# Patient Record
Sex: MALE | Race: WHITE | Employment: OTHER | URBAN - METROPOLITAN AREA
[De-identification: names, ages, dates, MRNs, and addresses within clinical notes are randomized per-mention and may not be internally consistent; named-entity substitution may affect disease eponyms.]

---

## 2020-04-25 ENCOUNTER — ANESTHESIA (OUTPATIENT)
Dept: INTERVENTIONAL RADIOLOGY/VASCULAR | Age: 70
DRG: 023 | End: 2020-04-25
Payer: MEDICARE

## 2020-04-25 ENCOUNTER — APPOINTMENT (OUTPATIENT)
Dept: INTERVENTIONAL RADIOLOGY/VASCULAR | Age: 70
DRG: 023 | End: 2020-04-25
Payer: MEDICARE

## 2020-04-25 ENCOUNTER — APPOINTMENT (OUTPATIENT)
Dept: CT IMAGING | Age: 70
DRG: 023 | End: 2020-04-25
Payer: MEDICARE

## 2020-04-25 ENCOUNTER — ANESTHESIA EVENT (OUTPATIENT)
Dept: INTERVENTIONAL RADIOLOGY/VASCULAR | Age: 70
DRG: 023 | End: 2020-04-25
Payer: MEDICARE

## 2020-04-25 ENCOUNTER — APPOINTMENT (OUTPATIENT)
Dept: GENERAL RADIOLOGY | Age: 70
DRG: 023 | End: 2020-04-25
Payer: MEDICARE

## 2020-04-25 ENCOUNTER — HOSPITAL ENCOUNTER (INPATIENT)
Age: 70
LOS: 8 days | Discharge: NURSING, REHAB OR LONG-TERM CARE | DRG: 023 | End: 2020-05-03
Attending: EMERGENCY MEDICINE | Admitting: INTERNAL MEDICINE
Payer: MEDICARE

## 2020-04-25 ENCOUNTER — APPOINTMENT (OUTPATIENT)
Dept: MRI IMAGING | Age: 70
DRG: 023 | End: 2020-04-25
Payer: MEDICARE

## 2020-04-25 VITALS — OXYGEN SATURATION: 100 % | SYSTOLIC BLOOD PRESSURE: 185 MMHG | TEMPERATURE: 94.9 F | DIASTOLIC BLOOD PRESSURE: 98 MMHG

## 2020-04-25 PROBLEM — I63.9 ACUTE CVA (CEREBROVASCULAR ACCIDENT) (HCC): Status: ACTIVE | Noted: 2020-04-25

## 2020-04-25 LAB
% CKMB: 1.1 % (ref 0–3.5)
-: NORMAL
ABSOLUTE EOS #: 0.09 K/UL (ref 0–0.44)
ABSOLUTE IMMATURE GRANULOCYTE: 0.03 K/UL (ref 0–0.3)
ABSOLUTE LYMPH #: 0.94 K/UL (ref 1.1–3.7)
ABSOLUTE MONO #: 0.4 K/UL (ref 0.1–1.2)
ALLEN TEST: ABNORMAL
ALLEN TEST: POSITIVE
AMORPHOUS: NORMAL
ANION GAP SERPL CALCULATED.3IONS-SCNC: 12 MMOL/L (ref 9–17)
ANION GAP: 11 MMOL/L (ref 7–16)
ANION GAP: 9 MMOL/L (ref 7–16)
BACTERIA: NORMAL
BASOPHILS # BLD: 1 % (ref 0–2)
BASOPHILS ABSOLUTE: 0.09 K/UL (ref 0–0.2)
BILIRUBIN URINE: NEGATIVE
BNP INTERPRETATION: ABNORMAL
BUN BLDV-MCNC: 14 MG/DL (ref 8–23)
BUN/CREAT BLD: ABNORMAL (ref 9–20)
CALCIUM SERPL-MCNC: 8 MG/DL (ref 8.6–10.4)
CASTS UA: NORMAL /LPF (ref 0–8)
CHLORIDE BLD-SCNC: 105 MMOL/L (ref 98–107)
CHOLESTEROL/HDL RATIO: 2.4
CHOLESTEROL: 111 MG/DL
CK MB: 1.5 NG/ML
CKMB INTERPRETATION: ABNORMAL
CO2: 21 MMOL/L (ref 20–31)
COLOR: YELLOW
COMMENT UA: ABNORMAL
CREAT SERPL-MCNC: 1.06 MG/DL (ref 0.7–1.2)
CRYSTALS, UA: NORMAL /HPF
DIFFERENTIAL TYPE: ABNORMAL
EOSINOPHILS RELATIVE PERCENT: 1 % (ref 1–4)
EPITHELIAL CELLS UA: NORMAL /HPF (ref 0–5)
FIO2: 4
FIO2: ABNORMAL
GFR AFRICAN AMERICAN: >60 ML/MIN
GFR NON-AFRICAN AMERICAN: 57 ML/MIN
GFR NON-AFRICAN AMERICAN: >60 ML/MIN
GFR NON-AFRICAN AMERICAN: >60 ML/MIN
GFR SERPL CREATININE-BSD FRML MDRD: >60 ML/MIN
GFR SERPL CREATININE-BSD FRML MDRD: >60 ML/MIN
GFR SERPL CREATININE-BSD FRML MDRD: ABNORMAL ML/MIN/{1.73_M2}
GFR SERPL CREATININE-BSD FRML MDRD: NORMAL ML/MIN/{1.73_M2}
GLUCOSE BLD-MCNC: 121 MG/DL (ref 75–110)
GLUCOSE BLD-MCNC: 156 MG/DL (ref 75–110)
GLUCOSE BLD-MCNC: 160 MG/DL (ref 70–99)
GLUCOSE BLD-MCNC: 177 MG/DL (ref 74–100)
GLUCOSE BLD-MCNC: 188 MG/DL (ref 74–100)
GLUCOSE URINE: NEGATIVE
HCO3 VENOUS: 28.2 MMOL/L (ref 22–29)
HCT VFR BLD CALC: 43.7 % (ref 40.7–50.3)
HDLC SERPL-MCNC: 46 MG/DL
HEMOGLOBIN: 14.3 G/DL (ref 13–17)
IMMATURE GRANULOCYTES: 0 %
INR BLD: 1.1
KETONES, URINE: NEGATIVE
LACTIC ACID, WHOLE BLOOD: 0.9 MMOL/L (ref 0.7–2.1)
LDL CHOLESTEROL: 49 MG/DL (ref 0–130)
LEUKOCYTE ESTERASE, URINE: NEGATIVE
LYMPHOCYTES # BLD: 12 % (ref 24–43)
MCH RBC QN AUTO: 29.3 PG (ref 25.2–33.5)
MCHC RBC AUTO-ENTMCNC: 32.7 G/DL (ref 28.4–34.8)
MCV RBC AUTO: 89.5 FL (ref 82.6–102.9)
MODE: ABNORMAL
MODE: ABNORMAL
MONOCYTES # BLD: 5 % (ref 3–12)
MUCUS: NORMAL
MYOGLOBIN: 55 NG/ML (ref 28–72)
NEGATIVE BASE EXCESS, ART: 1 (ref 0–2)
NEGATIVE BASE EXCESS, VEN: ABNORMAL (ref 0–2)
NITRITE, URINE: NEGATIVE
NRBC AUTOMATED: 0 PER 100 WBC
O2 DEVICE/FLOW/%: ABNORMAL
O2 DEVICE/FLOW/%: ABNORMAL
O2 SAT, VEN: 28 % (ref 60–85)
OTHER OBSERVATIONS UA: NORMAL
PARTIAL THROMBOPLASTIN TIME: 22.7 SEC (ref 20.5–30.5)
PARTIAL THROMBOPLASTIN TIME: 23.5 SEC (ref 20.5–30.5)
PATIENT TEMP: ABNORMAL
PATIENT TEMP: ABNORMAL
PCO2, VEN: 53.5 MM HG (ref 41–51)
PDW BLD-RTO: 14.4 % (ref 11.8–14.4)
PH UA: 7.5 (ref 5–8)
PH VENOUS: 7.33 (ref 7.32–7.43)
PLATELET # BLD: 160 K/UL (ref 138–453)
PLATELET ESTIMATE: ABNORMAL
PMV BLD AUTO: 11.4 FL (ref 8.1–13.5)
PO2, VEN: 20.4 MM HG (ref 30–50)
POC CHLORIDE: 103 MMOL/L (ref 98–107)
POC CHLORIDE: 104 MMOL/L (ref 98–107)
POC CREATININE: 1.05 MG/DL (ref 0.51–1.19)
POC CREATININE: 1.27 MG/DL (ref 0.51–1.19)
POC HCO3: 25 MMOL/L (ref 21–28)
POC HEMATOCRIT: 42 % (ref 41–53)
POC HEMATOCRIT: 44 % (ref 41–53)
POC HEMOGLOBIN: 14.4 G/DL (ref 13.5–17.5)
POC HEMOGLOBIN: 15.1 G/DL (ref 13.5–17.5)
POC IONIZED CALCIUM: 1.18 MMOL/L (ref 1.15–1.33)
POC IONIZED CALCIUM: 1.18 MMOL/L (ref 1.15–1.33)
POC LACTIC ACID: 0.76 MMOL/L (ref 0.56–1.39)
POC LACTIC ACID: 1.73 MMOL/L (ref 0.56–1.39)
POC O2 SATURATION: 98 % (ref 94–98)
POC PCO2 TEMP: ABNORMAL MM HG
POC PCO2 TEMP: ABNORMAL MM HG
POC PCO2: 44.2 MM HG (ref 35–48)
POC PH TEMP: ABNORMAL
POC PH TEMP: ABNORMAL
POC PH: 7.36 (ref 7.35–7.45)
POC PO2 TEMP: ABNORMAL MM HG
POC PO2 TEMP: ABNORMAL MM HG
POC PO2: 109.2 MM HG (ref 83–108)
POC POTASSIUM: 3.7 MMOL/L (ref 3.5–4.5)
POC POTASSIUM: 4.6 MMOL/L (ref 3.5–4.5)
POC SODIUM: 137 MMOL/L (ref 138–146)
POC SODIUM: 143 MMOL/L (ref 138–146)
POSITIVE BASE EXCESS, ART: ABNORMAL (ref 0–3)
POSITIVE BASE EXCESS, VEN: 1 (ref 0–3)
POTASSIUM SERPL-SCNC: 3.8 MMOL/L (ref 3.7–5.3)
PRO-BNP: 1361 PG/ML
PROTEIN UA: NEGATIVE
PROTHROMBIN TIME: 11.3 SEC (ref 9–12)
RBC # BLD: 4.88 M/UL (ref 4.21–5.77)
RBC # BLD: ABNORMAL 10*6/UL
RBC UA: NORMAL /HPF (ref 0–4)
RENAL EPITHELIAL, UA: NORMAL /HPF
SAMPLE SITE: ABNORMAL
SAMPLE SITE: ABNORMAL
SEG NEUTROPHILS: 81 % (ref 36–65)
SEGMENTED NEUTROPHILS ABSOLUTE COUNT: 6.58 K/UL (ref 1.5–8.1)
SODIUM BLD-SCNC: 138 MMOL/L (ref 135–144)
SPECIFIC GRAVITY UA: 1.02 (ref 1–1.03)
TCO2 (CALC), ART: 26 MMOL/L (ref 22–29)
TOTAL CK: 132 U/L (ref 39–308)
TOTAL CO2, VENOUS: 30 MMOL/L (ref 23–30)
TRICHOMONAS: NORMAL
TRIGL SERPL-MCNC: 80 MG/DL
TROPONIN INTERP: ABNORMAL
TROPONIN T: ABNORMAL NG/ML
TROPONIN, HIGH SENSITIVITY: 19 NG/L (ref 0–22)
TURBIDITY: CLEAR
URINE HGB: ABNORMAL
UROBILINOGEN, URINE: NORMAL
VLDLC SERPL CALC-MCNC: NORMAL MG/DL (ref 1–30)
WBC # BLD: 8.1 K/UL (ref 3.5–11.3)
WBC # BLD: ABNORMAL 10*3/UL
WBC UA: NORMAL /HPF (ref 0–5)
YEAST: NORMAL

## 2020-04-25 PROCEDURE — 84484 ASSAY OF TROPONIN QUANT: CPT

## 2020-04-25 PROCEDURE — 82553 CREATINE MB FRACTION: CPT

## 2020-04-25 PROCEDURE — 99291 CRITICAL CARE FIRST HOUR: CPT | Performed by: PSYCHIATRY & NEUROLOGY

## 2020-04-25 PROCEDURE — 93926 LOWER EXTREMITY STUDY: CPT

## 2020-04-25 PROCEDURE — 6360000002 HC RX W HCPCS: Performed by: ANESTHESIOLOGY

## 2020-04-25 PROCEDURE — C1760 CLOSURE DEV, VASC: HCPCS

## 2020-04-25 PROCEDURE — 83874 ASSAY OF MYOGLOBIN: CPT

## 2020-04-25 PROCEDURE — 2700000000 HC OXYGEN THERAPY PER DAY

## 2020-04-25 PROCEDURE — 36600 WITHDRAWAL OF ARTERIAL BLOOD: CPT

## 2020-04-25 PROCEDURE — 81001 URINALYSIS AUTO W/SCOPE: CPT

## 2020-04-25 PROCEDURE — C1887 CATHETER, GUIDING: HCPCS

## 2020-04-25 PROCEDURE — 82330 ASSAY OF CALCIUM: CPT

## 2020-04-25 PROCEDURE — 83605 ASSAY OF LACTIC ACID: CPT

## 2020-04-25 PROCEDURE — 6360000002 HC RX W HCPCS: Performed by: PSYCHIATRY & NEUROLOGY

## 2020-04-25 PROCEDURE — 71045 X-RAY EXAM CHEST 1 VIEW: CPT

## 2020-04-25 PROCEDURE — 03CG3ZZ EXTIRPATION OF MATTER FROM INTRACRANIAL ARTERY, PERCUTANEOUS APPROACH: ICD-10-PCS | Performed by: PSYCHIATRY & NEUROLOGY

## 2020-04-25 PROCEDURE — 2500000003 HC RX 250 WO HCPCS: Performed by: NURSE ANESTHETIST, CERTIFIED REGISTERED

## 2020-04-25 PROCEDURE — 82565 ASSAY OF CREATININE: CPT

## 2020-04-25 PROCEDURE — 2000000003 HC NEURO ICU R&B

## 2020-04-25 PROCEDURE — 84132 ASSAY OF SERUM POTASSIUM: CPT

## 2020-04-25 PROCEDURE — 99223 1ST HOSP IP/OBS HIGH 75: CPT | Performed by: PSYCHIATRY & NEUROLOGY

## 2020-04-25 PROCEDURE — 2709999900 HC NON-CHARGEABLE SUPPLY

## 2020-04-25 PROCEDURE — 6360000004 HC RX CONTRAST MEDICATION: Performed by: EMERGENCY MEDICINE

## 2020-04-25 PROCEDURE — 70551 MRI BRAIN STEM W/O DYE: CPT

## 2020-04-25 PROCEDURE — 3700000000 HC ANESTHESIA ATTENDED CARE

## 2020-04-25 PROCEDURE — 70496 CT ANGIOGRAPHY HEAD: CPT

## 2020-04-25 PROCEDURE — 84295 ASSAY OF SERUM SODIUM: CPT

## 2020-04-25 PROCEDURE — 2580000003 HC RX 258: Performed by: STUDENT IN AN ORGANIZED HEALTH CARE EDUCATION/TRAINING PROGRAM

## 2020-04-25 PROCEDURE — 6360000002 HC RX W HCPCS

## 2020-04-25 PROCEDURE — 82803 BLOOD GASES ANY COMBINATION: CPT

## 2020-04-25 PROCEDURE — 03HY32Z INSERTION OF MONITORING DEVICE INTO UPPER ARTERY, PERCUTANEOUS APPROACH: ICD-10-PCS | Performed by: PSYCHIATRY & NEUROLOGY

## 2020-04-25 PROCEDURE — 83036 HEMOGLOBIN GLYCOSYLATED A1C: CPT

## 2020-04-25 PROCEDURE — 2580000003 HC RX 258: Performed by: NURSE ANESTHETIST, CERTIFIED REGISTERED

## 2020-04-25 PROCEDURE — 61645 PERQ ART M-THROMBECT &/NFS: CPT | Performed by: PSYCHIATRY & NEUROLOGY

## 2020-04-25 PROCEDURE — 3700000001 HC ADD 15 MINUTES (ANESTHESIA)

## 2020-04-25 PROCEDURE — 82550 ASSAY OF CK (CPK): CPT

## 2020-04-25 PROCEDURE — 2500000003 HC RX 250 WO HCPCS: Performed by: PSYCHIATRY & NEUROLOGY

## 2020-04-25 PROCEDURE — C9248 INJ, CLEVIDIPINE BUTYRATE: HCPCS | Performed by: STUDENT IN AN ORGANIZED HEALTH CARE EDUCATION/TRAINING PROGRAM

## 2020-04-25 PROCEDURE — C1769 GUIDE WIRE: HCPCS

## 2020-04-25 PROCEDURE — 70450 CT HEAD/BRAIN W/O DYE: CPT

## 2020-04-25 PROCEDURE — 82947 ASSAY GLUCOSE BLOOD QUANT: CPT

## 2020-04-25 PROCEDURE — 74018 RADEX ABDOMEN 1 VIEW: CPT

## 2020-04-25 PROCEDURE — 96374 THER/PROPH/DIAG INJ IV PUSH: CPT

## 2020-04-25 PROCEDURE — 6360000002 HC RX W HCPCS: Performed by: STUDENT IN AN ORGANIZED HEALTH CARE EDUCATION/TRAINING PROGRAM

## 2020-04-25 PROCEDURE — 99221 1ST HOSP IP/OBS SF/LOW 40: CPT | Performed by: PSYCHIATRY & NEUROLOGY

## 2020-04-25 PROCEDURE — 85610 PROTHROMBIN TIME: CPT

## 2020-04-25 PROCEDURE — 94761 N-INVAS EAR/PLS OXIMETRY MLT: CPT

## 2020-04-25 PROCEDURE — 80061 LIPID PANEL: CPT

## 2020-04-25 PROCEDURE — 85730 THROMBOPLASTIN TIME PARTIAL: CPT

## 2020-04-25 PROCEDURE — 85014 HEMATOCRIT: CPT

## 2020-04-25 PROCEDURE — 2720000010 HC SURG SUPPLY STERILE

## 2020-04-25 PROCEDURE — 82435 ASSAY OF BLOOD CHLORIDE: CPT

## 2020-04-25 PROCEDURE — 36620 INSERTION CATHETER ARTERY: CPT

## 2020-04-25 PROCEDURE — 83880 ASSAY OF NATRIURETIC PEPTIDE: CPT

## 2020-04-25 PROCEDURE — C1757 CATH, THROMBECTOMY/EMBOLECT: HCPCS

## 2020-04-25 PROCEDURE — 6360000002 HC RX W HCPCS: Performed by: NURSE ANESTHETIST, CERTIFIED REGISTERED

## 2020-04-25 PROCEDURE — 85025 COMPLETE CBC W/AUTO DIFF WBC: CPT

## 2020-04-25 PROCEDURE — 6370000000 HC RX 637 (ALT 250 FOR IP): Performed by: STUDENT IN AN ORGANIZED HEALTH CARE EDUCATION/TRAINING PROGRAM

## 2020-04-25 PROCEDURE — C1894 INTRO/SHEATH, NON-LASER: HCPCS

## 2020-04-25 PROCEDURE — 93005 ELECTROCARDIOGRAM TRACING: CPT | Performed by: STUDENT IN AN ORGANIZED HEALTH CARE EDUCATION/TRAINING PROGRAM

## 2020-04-25 PROCEDURE — 99285 EMERGENCY DEPT VISIT HI MDM: CPT

## 2020-04-25 PROCEDURE — 80048 BASIC METABOLIC PNL TOTAL CA: CPT

## 2020-04-25 PROCEDURE — 87641 MR-STAPH DNA AMP PROBE: CPT

## 2020-04-25 RX ORDER — FENTANYL CITRATE 50 UG/ML
25 INJECTION, SOLUTION INTRAMUSCULAR; INTRAVENOUS EVERY 5 MIN PRN
Status: DISCONTINUED | OUTPATIENT
Start: 2020-04-25 | End: 2020-04-25

## 2020-04-25 RX ORDER — DEXTROSE MONOHYDRATE 50 MG/ML
100 INJECTION, SOLUTION INTRAVENOUS PRN
Status: DISCONTINUED | OUTPATIENT
Start: 2020-04-25 | End: 2020-05-03 | Stop reason: HOSPADM

## 2020-04-25 RX ORDER — ASPIRIN 300 MG/1
300 SUPPOSITORY RECTAL DAILY
Status: DISCONTINUED | OUTPATIENT
Start: 2020-04-25 | End: 2020-05-01

## 2020-04-25 RX ORDER — LABETALOL HYDROCHLORIDE 5 MG/ML
INJECTION, SOLUTION INTRAVENOUS PRN
Status: DISCONTINUED | OUTPATIENT
Start: 2020-04-25 | End: 2020-04-25 | Stop reason: SDUPTHER

## 2020-04-25 RX ORDER — 0.9 % SODIUM CHLORIDE 0.9 %
500 INTRAVENOUS SOLUTION INTRAVENOUS
Status: DISCONTINUED | OUTPATIENT
Start: 2020-04-25 | End: 2020-04-25

## 2020-04-25 RX ORDER — SENNA PLUS 8.6 MG/1
1 TABLET ORAL DAILY PRN
Status: DISCONTINUED | OUTPATIENT
Start: 2020-04-25 | End: 2020-05-03 | Stop reason: HOSPADM

## 2020-04-25 RX ORDER — LABETALOL HYDROCHLORIDE 5 MG/ML
5 INJECTION, SOLUTION INTRAVENOUS ONCE
Status: COMPLETED | OUTPATIENT
Start: 2020-04-25 | End: 2020-04-25

## 2020-04-25 RX ORDER — SODIUM CHLORIDE, SODIUM LACTATE, POTASSIUM CHLORIDE, CALCIUM CHLORIDE 600; 310; 30; 20 MG/100ML; MG/100ML; MG/100ML; MG/100ML
INJECTION, SOLUTION INTRAVENOUS CONTINUOUS PRN
Status: DISCONTINUED | OUTPATIENT
Start: 2020-04-25 | End: 2020-04-25 | Stop reason: SDUPTHER

## 2020-04-25 RX ORDER — DEXTROSE MONOHYDRATE 25 G/50ML
12.5 INJECTION, SOLUTION INTRAVENOUS PRN
Status: DISCONTINUED | OUTPATIENT
Start: 2020-04-25 | End: 2020-05-03 | Stop reason: HOSPADM

## 2020-04-25 RX ORDER — ASPIRIN 81 MG/1
81 TABLET ORAL DAILY
Status: DISCONTINUED | OUTPATIENT
Start: 2020-04-25 | End: 2020-05-01

## 2020-04-25 RX ORDER — ONDANSETRON 2 MG/ML
4 INJECTION INTRAMUSCULAR; INTRAVENOUS
Status: COMPLETED | OUTPATIENT
Start: 2020-04-25 | End: 2020-04-25

## 2020-04-25 RX ORDER — FENTANYL CITRATE 50 UG/ML
50 INJECTION, SOLUTION INTRAMUSCULAR; INTRAVENOUS EVERY 5 MIN PRN
Status: DISCONTINUED | OUTPATIENT
Start: 2020-04-25 | End: 2020-04-25

## 2020-04-25 RX ORDER — ATROPINE SULFATE 0.1 MG/ML
1 INJECTION INTRAVENOUS ONCE
Status: COMPLETED | OUTPATIENT
Start: 2020-04-25 | End: 2020-04-25

## 2020-04-25 RX ORDER — HYDRALAZINE HYDROCHLORIDE 20 MG/ML
10 INJECTION INTRAMUSCULAR; INTRAVENOUS EVERY 6 HOURS PRN
Status: DISCONTINUED | OUTPATIENT
Start: 2020-04-25 | End: 2020-05-03 | Stop reason: HOSPADM

## 2020-04-25 RX ORDER — SODIUM CHLORIDE 0.9 % (FLUSH) 0.9 %
10 SYRINGE (ML) INJECTION PRN
Status: DISCONTINUED | OUTPATIENT
Start: 2020-04-25 | End: 2020-05-03 | Stop reason: HOSPADM

## 2020-04-25 RX ORDER — HYDRALAZINE HYDROCHLORIDE 20 MG/ML
5 INJECTION INTRAMUSCULAR; INTRAVENOUS EVERY 10 MIN PRN
Status: DISCONTINUED | OUTPATIENT
Start: 2020-04-25 | End: 2020-04-25

## 2020-04-25 RX ORDER — LABETALOL HYDROCHLORIDE 5 MG/ML
5 INJECTION, SOLUTION INTRAVENOUS EVERY 10 MIN PRN
Status: DISCONTINUED | OUTPATIENT
Start: 2020-04-25 | End: 2020-04-25

## 2020-04-25 RX ORDER — LABETALOL HYDROCHLORIDE 5 MG/ML
10 INJECTION, SOLUTION INTRAVENOUS EVERY 6 HOURS PRN
Status: DISCONTINUED | OUTPATIENT
Start: 2020-04-25 | End: 2020-04-25

## 2020-04-25 RX ORDER — HEPARIN SODIUM 10000 [USP'U]/100ML
1000 INJECTION, SOLUTION INTRAVENOUS CONTINUOUS
Status: DISCONTINUED | OUTPATIENT
Start: 2020-04-25 | End: 2020-05-01

## 2020-04-25 RX ORDER — PROMETHAZINE HYDROCHLORIDE 25 MG/1
12.5 TABLET ORAL EVERY 6 HOURS PRN
Status: DISCONTINUED | OUTPATIENT
Start: 2020-04-25 | End: 2020-05-03 | Stop reason: HOSPADM

## 2020-04-25 RX ORDER — ONDANSETRON 2 MG/ML
4 INJECTION INTRAMUSCULAR; INTRAVENOUS EVERY 6 HOURS PRN
Status: DISCONTINUED | OUTPATIENT
Start: 2020-04-25 | End: 2020-05-03 | Stop reason: HOSPADM

## 2020-04-25 RX ORDER — ACETAMINOPHEN 650 MG/1
650 SUPPOSITORY RECTAL EVERY 6 HOURS PRN
Status: DISCONTINUED | OUTPATIENT
Start: 2020-04-25 | End: 2020-05-03 | Stop reason: HOSPADM

## 2020-04-25 RX ORDER — PROMETHAZINE HYDROCHLORIDE 25 MG/ML
6.25 INJECTION, SOLUTION INTRAMUSCULAR; INTRAVENOUS
Status: DISCONTINUED | OUTPATIENT
Start: 2020-04-25 | End: 2020-04-25

## 2020-04-25 RX ORDER — SODIUM CHLORIDE 0.9 % (FLUSH) 0.9 %
10 SYRINGE (ML) INJECTION EVERY 12 HOURS SCHEDULED
Status: DISCONTINUED | OUTPATIENT
Start: 2020-04-25 | End: 2020-05-03 | Stop reason: HOSPADM

## 2020-04-25 RX ORDER — FENTANYL CITRATE 50 UG/ML
INJECTION, SOLUTION INTRAMUSCULAR; INTRAVENOUS PRN
Status: DISCONTINUED | OUTPATIENT
Start: 2020-04-25 | End: 2020-04-25 | Stop reason: SDUPTHER

## 2020-04-25 RX ORDER — ATORVASTATIN CALCIUM 80 MG/1
80 TABLET, FILM COATED ORAL NIGHTLY
Status: DISCONTINUED | OUTPATIENT
Start: 2020-04-25 | End: 2020-04-26

## 2020-04-25 RX ORDER — IODIXANOL 270 MG/ML
100 INJECTION, SOLUTION INTRAVASCULAR
Status: COMPLETED | OUTPATIENT
Start: 2020-04-25 | End: 2020-04-25

## 2020-04-25 RX ORDER — ATROPINE SULFATE 0.1 MG/ML
0.5 INJECTION INTRAVENOUS PRN
Status: DISCONTINUED | OUTPATIENT
Start: 2020-04-25 | End: 2020-05-03 | Stop reason: HOSPADM

## 2020-04-25 RX ORDER — SODIUM CHLORIDE 9 MG/ML
INJECTION, SOLUTION INTRAVENOUS CONTINUOUS
Status: DISCONTINUED | OUTPATIENT
Start: 2020-04-25 | End: 2020-04-26

## 2020-04-25 RX ORDER — MIDAZOLAM HYDROCHLORIDE 1 MG/ML
INJECTION INTRAMUSCULAR; INTRAVENOUS PRN
Status: DISCONTINUED | OUTPATIENT
Start: 2020-04-25 | End: 2020-04-25 | Stop reason: SDUPTHER

## 2020-04-25 RX ORDER — CEFAZOLIN SODIUM 1 G/3ML
INJECTION, POWDER, FOR SOLUTION INTRAMUSCULAR; INTRAVENOUS PRN
Status: DISCONTINUED | OUTPATIENT
Start: 2020-04-25 | End: 2020-04-25 | Stop reason: SDUPTHER

## 2020-04-25 RX ORDER — ACETAMINOPHEN 325 MG/1
650 TABLET ORAL EVERY 6 HOURS PRN
Status: DISCONTINUED | OUTPATIENT
Start: 2020-04-25 | End: 2020-05-03 | Stop reason: HOSPADM

## 2020-04-25 RX ORDER — DIPHENHYDRAMINE HYDROCHLORIDE 50 MG/ML
12.5 INJECTION INTRAMUSCULAR; INTRAVENOUS
Status: ACTIVE | OUTPATIENT
Start: 2020-04-25 | End: 2020-04-25

## 2020-04-25 RX ORDER — OXYCODONE HYDROCHLORIDE AND ACETAMINOPHEN 5; 325 MG/1; MG/1
1 TABLET ORAL EVERY 4 HOURS PRN
Status: DISCONTINUED | OUTPATIENT
Start: 2020-04-25 | End: 2020-05-01

## 2020-04-25 RX ORDER — ACETAMINOPHEN 325 MG/1
650 TABLET ORAL EVERY 4 HOURS PRN
Status: DISCONTINUED | OUTPATIENT
Start: 2020-04-25 | End: 2020-04-25

## 2020-04-25 RX ORDER — NICOTINE POLACRILEX 4 MG
15 LOZENGE BUCCAL PRN
Status: DISCONTINUED | OUTPATIENT
Start: 2020-04-25 | End: 2020-05-03 | Stop reason: HOSPADM

## 2020-04-25 RX ORDER — MAGNESIUM SULFATE 1 G/100ML
INJECTION INTRAVENOUS
Status: COMPLETED
Start: 2020-04-25 | End: 2020-04-25

## 2020-04-25 RX ORDER — HEPARIN SODIUM 1000 [USP'U]/ML
INJECTION, SOLUTION INTRAVENOUS; SUBCUTANEOUS PRN
Status: DISCONTINUED | OUTPATIENT
Start: 2020-04-25 | End: 2020-04-25 | Stop reason: SDUPTHER

## 2020-04-25 RX ADMIN — Medication 10 MG: at 10:55

## 2020-04-25 RX ADMIN — MIDAZOLAM HYDROCHLORIDE 2 MG: 1 INJECTION, SOLUTION INTRAMUSCULAR; INTRAVENOUS at 09:14

## 2020-04-25 RX ADMIN — IODIXANOL 110 ML: 270 INJECTION, SOLUTION INTRAVASCULAR at 11:00

## 2020-04-25 RX ADMIN — Medication 10 MG: at 10:49

## 2020-04-25 RX ADMIN — LABETALOL HYDROCHLORIDE 5 MG: 5 INJECTION, SOLUTION INTRAVENOUS at 07:41

## 2020-04-25 RX ADMIN — MIDAZOLAM HYDROCHLORIDE 2 MG: 1 INJECTION, SOLUTION INTRAMUSCULAR; INTRAVENOUS at 10:15

## 2020-04-25 RX ADMIN — CLEVIPIDINE 2 MG/HR: 0.5 EMULSION INTRAVENOUS at 22:15

## 2020-04-25 RX ADMIN — CLEVIPIDINE 4 MG/HR: 0.5 EMULSION INTRAVENOUS at 11:01

## 2020-04-25 RX ADMIN — HEPARIN SODIUM 1000 UNITS: 1000 INJECTION INTRAVENOUS; SUBCUTANEOUS at 10:20

## 2020-04-25 RX ADMIN — HEPARIN SODIUM 1000 UNITS: 1000 INJECTION INTRAVENOUS; SUBCUTANEOUS at 10:40

## 2020-04-25 RX ADMIN — SODIUM CHLORIDE: 9 INJECTION, SOLUTION INTRAVENOUS at 17:48

## 2020-04-25 RX ADMIN — LABETALOL HYDROCHLORIDE 5 MG: 5 INJECTION, SOLUTION INTRAVENOUS at 07:25

## 2020-04-25 RX ADMIN — HEPARIN SODIUM 2000 UNITS: 1000 INJECTION INTRAVENOUS; SUBCUTANEOUS at 09:40

## 2020-04-25 RX ADMIN — INSULIN LISPRO 2 UNITS: 100 INJECTION, SOLUTION INTRAVENOUS; SUBCUTANEOUS at 17:04

## 2020-04-25 RX ADMIN — MAGNESIUM SULFATE HEPTAHYDRATE 2 G: 1 INJECTION, SOLUTION INTRAVENOUS at 11:40

## 2020-04-25 RX ADMIN — HEPARIN SODIUM 1000 UNITS/HR: 10000 INJECTION, SOLUTION INTRAVENOUS at 17:48

## 2020-04-25 RX ADMIN — ATROPINE SULFATE 0.5 MG: 0.1 INJECTION PARENTERAL at 11:33

## 2020-04-25 RX ADMIN — FENTANYL CITRATE 50 MCG: 50 INJECTION INTRAMUSCULAR; INTRAVENOUS at 09:35

## 2020-04-25 RX ADMIN — CEFAZOLIN 3000 MG: 1 INJECTION, POWDER, FOR SOLUTION INTRAMUSCULAR; INTRAVENOUS at 09:18

## 2020-04-25 RX ADMIN — ONDANSETRON 4 MG: 2 INJECTION INTRAMUSCULAR; INTRAVENOUS at 11:32

## 2020-04-25 RX ADMIN — ASPIRIN 81 MG: 81 TABLET, COATED ORAL at 17:03

## 2020-04-25 RX ADMIN — FENTANYL CITRATE 50 MCG: 50 INJECTION INTRAMUSCULAR; INTRAVENOUS at 09:25

## 2020-04-25 RX ADMIN — SODIUM CHLORIDE, POTASSIUM CHLORIDE, SODIUM LACTATE AND CALCIUM CHLORIDE: 600; 310; 30; 20 INJECTION, SOLUTION INTRAVENOUS at 09:02

## 2020-04-25 RX ADMIN — ENOXAPARIN SODIUM 30 MG: 30 INJECTION SUBCUTANEOUS at 17:03

## 2020-04-25 RX ADMIN — IOHEXOL 90 ML: 350 INJECTION, SOLUTION INTRAVENOUS at 08:13

## 2020-04-25 ASSESSMENT — PULMONARY FUNCTION TESTS
PIF_VALUE: 0
PIF_VALUE: 1
PIF_VALUE: 0
PIF_VALUE: 1
PIF_VALUE: 0
PIF_VALUE: 2
PIF_VALUE: 0

## 2020-04-25 ASSESSMENT — PAIN SCALES - WONG BAKER: WONGBAKER_NUMERICALRESPONSE: 0

## 2020-04-25 NOTE — PROCEDURES
PROCEDURE NOTE - ARTERIAL LINE PLACEMENT    PATIENT NAME: Meryle Lade  MEDICAL RECORD NO. [id number]  DATE: 4/25/2020  ATTENDING PHYSICIAN:  Rad      PREOPERATIVE DIAGNOSIS:  Need for blood pressure monitoring  POSTOPERATIVE DIAGNOSIS:  Same  PROCEDURE PERFORMED: right brachial Arterial Line Insertion  PERFORMING PHYSICIAN:  Taisha Walker DO  ESTIMATED BLOOD LOSS:  Less than 25 ml  COMPLICATIONS:  None immediately appreciated. DISCUSSION:  Meryle Lade is a 78 y.o. male who requires invasive pressure monitoring. The history and physical examination were reviewed and confirmed. CONSENT: Unable to be obtained due to patient's condition. PROCEDURE:  A timeout was initiated by the bedside nurse and was confirmed by those present. The patient was placed in a supine position. The skin overlying the right brachial was prepped with chlorhexadine. Through this region, the introducer needle through catheter was inserted into right brachial artery until pulsatile bright blood was seen in collection tubing. Guidewire was advanced with no resistance. Catheter was advanced into the artery, wire was pulled with brisk bleeding noted. Pressure monitoring setup was connected to the catheter, it aspirated and flushed easily. The catheter was secured to the wrist with 3-0 silk. No immediate complication was evident. All sponge, instrument and needle counts were correct at the completion of the procedure.       Taisha Walker DO  5:50 PM, 4/25/20

## 2020-04-25 NOTE — ED PROVIDER NOTES
Emergency Medicine Attending Note    I have seen and examined the patient in conjunction with the Resident/MLP. Please see my key portion documented:      I agree with the assessment and plan as discussed with Dr. Fung. Electronically signed:  Shelbi Shaikh M.D.            Solo Chen MD  04/25/20 5803

## 2020-04-25 NOTE — ED NOTES
effect or midline shift. No abnormal extra-axial fluid collection. There is question of a small chronic infarct involving the left parietal lobe. Otherwise, the gray-white differentiation is maintained without evidence of an acute infarct. There is no evidence of hydrocephalus. There are areas of hypoattenuation in the periventricular and subcortical white matter, which is nonspecific, but may represent chronic microvasvular ischemic change. ORBITS: The visualized portion of the orbits demonstrate no acute abnormality. SINUSES: A mucous retention cyst is seen in the left maxillary sinus. The mastoid air cells demonstrate no acute abnormality. SOFT TISSUES/SKULL:  No acute abnormality of the visualized skull or soft tissues. 1. Motion partially limits evaluation. 2. No convincing acute intracranial abnormality. 3. There is question of a small chronic infarct involving the left parietal lobe. Findings were acute acute to Dr. Luci Galvan on 4/25/2020 at 7:27 am.       Physical assessment performed:    Neuro: Global aphasia, right sided facial droop, right side flaccid,   Resp: lungs clear to auscultation bilaterally, normal effort  Cardiac: rate irregular, no murmur, monitor shows afib  Muscular/skeletal/peripheral vascular: flavio lower extremities red, swollen with fungal growth, no tenderness in the calves, pulses present in 4 extremities   Abd/GI/: abd flat, soft, non tender, bowel sounds present x 4 quadrants   Skin: normal color, warm, diaphoretic      IV LINES   Time Size Site # Attempts Performed By   PTA 18g RT AC UNKNOWN [facility]   PTA 18g  LT FA UNKNOWN [facility]              Total Amount of IV Fluids Infused: 40ml    MEDS / ELECTRICAL RX   Time Therapy Dosage Route Response Performed By   5096 Labetalol 5mg IV Decreased BP KIMBER Gonzalez EMT-P   4248 Labetalol 5mg IV Decreased BP FRANCHESCA Moore RN                                       TPA Administration   Time Bolus Dose Infusion Dose   NA     NA at 8:54 AM CHANI Butler RN  04/25/20 8800

## 2020-04-25 NOTE — ANESTHESIA PRE PROCEDURE
Department of Anesthesiology  Preprocedure Note       Name:  Nathaniel Garza   Age:  78 y.o.  :  1950                                          MRN:  [id number]         Date:  2020      Surgeon: * Surgery not found *    Procedure: IR ANGIOGRAM CAROTID CEREBRAL BILATERAL    Medications prior to admission:   Prior to Admission medications    Not on File       Current medications:    Current Facility-Administered Medications   Medication Dose Route Frequency Provider Last Rate Last Dose    clevidipine (CLEVIPREX) 50 MG/100ML infusion             clevidipine (CLEVIPREX) infusion  2 mg/hr Intravenous Continuous Virgilio Plush, DO         No current outpatient medications on file. Allergies: Allergies not on file    Problem List:  There is no problem list on file for this patient. Past Medical History:        Diagnosis Date    Atrial fibrillation ([facility] [address]     Stroke [facility])        Past Surgical History:  No past surgical history on file. Social History:    Social History     Tobacco Use    Smoking status: Not on file   Substance Use Topics    Alcohol use: Not on file                                Counseling given: Not Answered      Vital Signs (Current):   Vitals:    20 0754 20 0800 20 0813 20 0815   BP: (!) 188/123 (!) 211/125 (!) 189/131 (!) 210/115   Pulse: 80 79 75 85   Resp: 30 24 27 20   Temp: 98.1 °F (36.7 °C)      TempSrc: Oral      SpO2: 96% 95% 96% 96%   Weight:       Height:                                                  BP Readings from Last 3 Encounters:   20 (!) 210/115       NPO Status:                                                                                 BMI:   Wt Readings from Last 3 Encounters:   20 (!) 320 lb (145.2 kg)     Body mass index is 45.92 kg/m².     CBC:   Lab Results   Component Value Date    WBC 8.1 2020    RBC 4.88 2020    HGB 14.3 2020    HCT 43.7 2020    MCV 89.5 2020    RDW 14.4 2020

## 2020-04-25 NOTE — DISCHARGE INSTR - COC
Continuity of Care Form    Patient Name: Yossi Donahue   :  1950  MRN:  [id number]    Admit date:  2020  Discharge date:  ***    Code Status Order: Full Code   Advance Directives:     Admitting Physician:  Henna Hpoe MD  PCP: No primary care provider on file. Discharging Nurse: [facility] Unit/Room#: 3288/8621-16  Discharging Unit Phone Number: ***    Emergency Contact:   Extended Emergency Contact Information  Primary Emergency Contact: [address], daughter  Home Phone: [phone]  Relation: Child  Secondary Emergency Contact: [address] Phone: [phone]  Relation: Spouse  Preferred language: English   needed? No    Past Surgical History:  History reviewed. No pertinent surgical history. Immunization History: There is no immunization history on file for this patient.     Active Problems:  Patient Active Problem List   Diagnosis Code    Cerebrovascular accident (CVA) ([facility] [address] I63.9    Acute ischemic left MCA stroke (HCC) I63.512    Bradycardia R00.1       Isolation/Infection:   Isolation          No Isolation        Patient Infection Status     None to display          Nurse Assessment:  Last Vital Signs: BP (!) 156/80   Pulse 59   Temp 98.4 °F (36.9 °C)   Resp 18   Ht 5' 10\" (1.778 m)   Wt (!) 320 lb (145.2 kg)   SpO2 100%   BMI 45.92 kg/m²     Last documented pain score (0-10 scale):    Last Weight:   Wt Readings from Last 1 Encounters:   20 (!) 320 lb (145.2 kg)     Mental Status:  {IP PT MENTAL STATUS:}    IV Access:  { JUNAID IV ACCESS:723363106}    Nursing Mobility/ADLs:  Walking   {CHP DME HSZM:503025337}  Transfer  {CHP DME TTJM:996997941}  Bathing  {CHP DME YJEM:691450595}  Dressing  {CHP DME GRIA:144347729}  Toileting  {CHP DME LRPV:231190951}  Feeding  {CHP DME FTMY:145203132}  Med Admin  {CHP DME OWKO:253242662}  Med Delivery   { JUNAID MED Delivery:566262973}    Wound Care Documentation and Therapy:

## 2020-04-25 NOTE — H&P
Neuro ICU History & Physical    Patient Name: Hunter Byrd  Patient : 1950  Room/Bed: 4511/0499-01  Code Status: Full Code  Allergies: No Known Allergies    CHIEF COMPLAINT     stroke    HPI    History Obtained From: chart review    The patient is a 78 y.o. male presented with wake up aphasia, dysarthria, right sided weakness, and left sided gaze. Pt woke up with these symptoms between 5100-9506. Pt reported to go to sleep, per family, approximately 0200. Pt had elevated SBP >200 for EMS and was given Labetalol en route to ER. CT head completed in mobile stroke unit which was unremarkable, no hemorrhage. Not a tPA candidate because he was outside the window. CTA h/n showing occlusion of left proximal M2 at the level of the sylvian fissure. He was taken for emergent thrombectomy. TICI 2. Patient's son states that pt has a known history of atrial fibrillation but has not been on anticoagulation or any medication for \"a very long time\". When patient arrived to ICU, he quickly experienced bradycardia down to as low as 28 bpm. Pt actively vomiting and was suctioned aggressively. Pt received 0.5mg Atropine with resolution of the bradycardia, HR then into 60s. Hypertensive on Cleviprex. Empirically given 2mg Mg. Monitor showing AF. Admitted to ICU From: ER   Reason for ICU Admission: status post        PATIENT HISTORY   Past Medical History:        Diagnosis Date    Atrial fibrillation ([address]     Stroke [facility])        Past Surgical History:    History reviewed. No pertinent surgical history.     Social History:   Social History     Socioeconomic History    Marital status: Unknown     Spouse name: Not on file    Number of children: Not on file    Years of education: Not on file    Highest education level: Not on file   Occupational History    Not on file   Social Needs    Financial resource strain: Not on file    Food insecurity     Worry: Not on file     Inability: Not on file   Genasys 0 - alert; keenly responsive  1b. Level of consciousness questions:  2 - answers neither question correctly  1c. Level of consciousness questions:  0 - performs both tasks correctly  2. Best Gaze:  2 - forced deviation, or total gaze paresis not overcome by oculocephalic maneuver  3. Visual:  0 - no visual loss  4. Facial Palsy:  2 - partial paralysis (total or near total paralysis of the lower face)  5a. Motor left arm:  0 - no drift, limb holds 90 (or 45) degrees for full 10 seconds  5b. Motor right arm:  3 - no effort against gravity, limb falls  6a. Motor left le - no drift; leg holds 30 degree position for full 5 seconds  6b. Motor right leg:  3 - no effort against gravity; leg falls to bed immediately  7. Limb Ataxia:  0 - absent  8. Sensory:  1 - mild to moderate sensory loss; patient feels pinprick is less sharp or is dull on the affected side; there is a loss of superficial pain with pinprick but patient is aware of being touched   9. Best Language:  3 - mute, global aphasia; no usable speech or auditory comprehension  10. Dysarthria:  2 - severe; patient speech is so slurred as to be unintelligible in the absence of or our of proportion to any dysphagia, or is mute/anarthric   11.   Extinction and Inattention:  0 - no abnormality    NIH 18    LABS AND IMAGING:     RECENT LABS:  CBC with Differential:    Lab Results   Component Value Date    WBC 8.1 2020    RBC 4.88 2020    HGB 14.3 2020    HCT 43.7 2020     2020    MCV 89.5 2020    MCH 29.3 2020    MCHC 32.7 2020    RDW 14.4 2020    LYMPHOPCT 12 2020    MONOPCT 5 2020    BASOPCT 1 2020    MONOSABS 0.40 2020    LYMPHSABS 0.94 2020    EOSABS 0.09 2020    BASOSABS 0.09 2020    DIFFTYPE NOT REPORTED 2020     BMP:    Lab Results   Component Value Date     2020    K 3.8 2020     2020    CO2 21 head  - no acute abnormality  - CTA h/n  -  occlusion of left proximal M2 at the level of the sylvian fissure  - s/p thrombectomy. TICI 2C. Remaining distal occlusion at M6  - MRI brain WO sp thrombectomy  - areas of acute infarct in the left insular cortex and left frontotemporal region and a smaller area of acute infarct in the left parieto-occipital region consistent with left middle cerebral artery territory infarcts  - Goal -170  - Aspirin daily  - repeat CT head when therapeutic on Heparin  - do not bend right groin for 3 hours post thrombectomy    CARDIOVASCULAR:  BP Range: Systolic (54LJL), OVA:352 , Min:146 , WILIAM:401     Diastolic (84NVW), UOO:662, Min:73, Max:131    Pulse Range: Pulse  Av.1  Min: 0  Max: 85  - Goal -170  - AF without RVR, no AC  - will start Heparin, low dose, no bolus  - echo with bubble  - Trop 19  - Cleviprex gtt for BP control, wean as able  - Hydralazine PRN  - Episode of bradycardia as low as 28, received Atropin 0.5mg x 1. More atropine at bedside if needed. May start low dose dopamine infusion if needed for bradycardia  - No palpable DP or PT pulse on the left. Unable to obtain doppler signal of DP or PT pulses on the left. Bedside US showing pulsatile L fem, L PT, but unable to visualize pulsations of L DP. Formal arterial US showing adequate flow  - telemetry  - Pro-BNP 1,361    PULMONARY:  Respiration Range: Resp  Avg: 15.6  Min: 0  Max: 39  Current Pulse Ox: SpO2: 100 %  24HR Pulse Ox Range: SpO2  Av.6 %  Min: 95 %  Max: 100 %  - pulse oximetry  - CXR - vascular congestion. DC IVF when able  - low threshold for antimicrobial with possible aspiration concern    RENAL/FLUID/ELECTROLYTE:  - BUN 14/ Creatinine 1.06  - I/O: In: 500 [I.V.:500]  Out: 2450 [Urine:]  - IVF: NS 100cc/hr  - daily BMP  - 2mg Mg    GI/NUTRITION:  NUTRITION:  Diet NPO Effective Now Exceptions are:  Other (See Comment)   - keep NPO until  AM then swallow study  - NG to LIS for emesis  - concern for aspiration due to episode of emesis  - Bowel regimen:  MoM, Zofran,   - GI prophylaxis: NI    ID:  Tmax: Temp (24hrs), Av °F (34.4 °C), Min:90.4 °F (32.4 °C), Max:98.4 °F (36.9 °C)    Temperature Range: Temp: 98.4 °F (36.9 °C) Temp  Av °F (34.4 °C)  Min: 90.4 °F (32.4 °C)  Max: 98.4 °F (36.9 °C)  - WBC   Lab Results   Component Value Date    WBC 8.1 2020     - Antimicrobials:  not indicated   - afebrile    HEME:   Recent Labs     20  0757   HGB 14.3    monitor  - Platelets 828    ENDOCRINE:  - Continue to monitor blood glucose, goal <180  - glucose controlled - most recent BGL is   Recent Labs     20  0757   GLUCOSE 160*   - MDSSI  - f/u HbA1c    OTHER:  - PT/OT/ST   - Code Status: Full Code   - wound evaluation    PROPHYLAXIS:  Stress ulcer: NI    DVT PROPHYLAXIS:  - SCD sleeves - Thigh High   - switch from Lovenox to Heparin gtt      Shilpi Johnson DO  Neuro Critical Care Service   2020     1:22 PM

## 2020-04-25 NOTE — ED PROVIDER NOTES
[facility] ED  Emergency Department Encounter  Emergency Medicine Resident     Pt Name: Rome Ruggiero  MRN: [id number]  [place] 1950  Date of evaluation: 4/25/20  PCP:  No primary care provider on file. CHIEF COMPLAINT       CVA    HISTORY OFPRESENT ILLNESS  (Location/Symptom, Timing/Onset, Context/Setting, Quality, Duration, Modifying Factors,Severity.)      Rome Ruggiero is a 79 yo male who presents as a race alert for possible stroke. Patient was last known well at 2 AM per EMS and patient is having difficulty speaking and right-sided weakness. Patient unable to provide history as he is nonverbal currently. Patient does have a history of atrial fibrillation and not on any anticoagulation. History of stroke in the past with questionable chronic right-sided deficits however patient is currently completely paralyzed on the right side. PAST MEDICAL / SURGICAL / SOCIAL / FAMILY HISTORY      has a past medical history of Atrial fibrillation ([facility] [address] and Stroke ([facility] [address] CVA, atrial fibrillation. Likely more medical history but unable to obtain at this time. has no past surgical history on file.  Unknown    Social History     Socioeconomic History    Marital status: Unknown     Spouse name: Not on file    Number of children: Not on file    Years of education: Not on file    Highest education level: Not on file   Occupational History    Not on file   Social Needs    Financial resource strain: Not on file    Food insecurity     Worry: Not on file     Inability: Not on file    Transportation needs     Medical: Not on file     Non-medical: Not on file   Tobacco Use    Smoking status: Not on file   Substance and Sexual Activity    Alcohol use: Not on file    Drug use: Not on file    Sexual activity: Not on file   Lifestyle    Physical activity     Days per week: Not on file     Minutes per session: Not on file    Stress: Not on file   Relationships    Social connections immediately  7. Limb Ataxia:  0 - absent  8. Sensory:  0 - normal; no sensory loss  9. Best Language:  3 - mute, global aphasia; no usable speech or auditory comprehension  10. Dysarthria:  2 - severe; patient speech is so slurred as to be unintelligible in the absence of or our of proportion to any dysphagia, or is mute/anarthric   11. Extinction and Inattention:  1 - visual, tactile, auditory, spatial or personal inattention or extinction to bilateral simultaneous stimulation in one of the sensory modalities     TOTAL:  23        DIFFERENTIAL  DIAGNOSIS     PLAN (LABS / IMAGING / EKG):  Orders Placed This Encounter   Procedures    CT HEAD WO CONTRAST    CTA HEAD NECK W CONTRAST    MRI BRAIN WO CONTRAST    XR CHEST PORTABLE    XR ABDOMEN (KUB) (SINGLE AP VIEW)    IR ANGIOGRAM CAROTID C EREBRAL BILATERAL    STROKE PANEL    Brain Natriuretic Peptide    Hemoglobin and hematocrit, blood    SODIUM (POC)    POTASSIUM (POC)    CHLORIDE (POC)    CALCIUM, IONIC (POC)    Misc nursing order (specify)    Inpatient consult to Neurocritical care    Venous Blood Gas, POC    Creatinine W/GFR Point of Care    Lactic Acid, POC    POCT Glucose    Anion Gap (Calc) POC    EKG 12 Lead       MEDICATIONS ORDERED:  Orders Placed This Encounter   Medications    iohexol (OMNIPAQUE 350) solution 90 mL    clevidipine (CLEVIPREX) 50 MG/100ML infusion     CONCHITA VIDAL: cabinet override    clevidipine (CLEVIPREX) infusion    labetalol (NORMODYNE;TRANDATE) injection 5 mg    labetalol (NORMODYNE;TRANDATE) injection 5 mg           Initial MDM/Plan/ED course: 78 y.o. male who presents with acute stroke. Presenting blood sugar was 135. Patient was brought in by mobile stroke unit for acute onset of right sided paralysis and a aphasia.   Patient with history of atrial fibrillation not on anticoagulation as well as history of previous stroke in the past.  Initial CT scan by mobile stroke did not show any Findings were acute acute to Dr. Gus Rivera on 4/25/2020 at 7:27 am.     Xr Chest Portable    Result Date: 4/25/2020  EXAMINATION: ONE XRAY VIEW OF THE CHEST 4/25/2020 8:50 am COMPARISON: None available. HISTORY: ORDERING SYSTEM PROVIDED HISTORY: look for metal for MRI TECHNOLOGIST PROVIDED HISTORY: look for metal for MRI FINDINGS: There is bibasilar atelectasis. The cardiac silhouette is enlarged. There is no pneumothorax or pleural effusion. There is no radiopaque foreign body. Degenerative changes involve the bilateral shoulders. 1. Cardiomegaly. Cta Head Neck W Contrast    Result Date: 4/25/2020  EXAMINATION: CTA OF THE HEAD AND NECK WITH CONTRAST 4/25/2020 7:53 am: TECHNIQUE: CTA of the head and neck was performed with the administration of intravenous contrast. Multiplanar reformatted images are provided for review. MIP images are provided for review. Stenosis of the internal carotid arteries measured using NASCET criteria. Dose modulation, iterative reconstruction, and/or weight based adjustment of the mA/kV was utilized to reduce the radiation dose to as low as reasonably achievable. COMPARISON: None. HISTORY: ORDERING SYSTEM PROVIDED HISTORY: cva TECHNOLOGIST PROVIDED HISTORY: cva Reason for Exam: worsening rt side weakness Initial evaluation. FINDINGS: CTA NECK: AORTIC ARCH/ARCH VESSELS: No dissection or arterial injury. No significant stenosis of the brachiocephalic or subclavian arteries. CAROTID ARTERIES: No dissection, arterial injury, or hemodynamically significant stenosis by NASCET criteria. VERTEBRAL ARTERIES: The origin of the left vertebral artery is not well visualized. Otherwise, no dissection, arterial injury, or significant stenosis. There is a right dominant vertebral artery. SOFT TISSUES: No focal consolidation is seen within the visualized lung apices. Multiple subcentimeter mediastinal lymph nodes are seen, which are nonspecific. BONES: No acute osseous abnormality.  CTA HEAD: CARE    CRITICAL CARE:  Please see attending note    FINAL IMPRESSION      1. Cerebrovascular accident (CVA), unspecified mechanism ([facility] [address]          DISPOSITION / PLAN     DISPOSITION    Admit    PATIENT REFERRED TO:  No follow-up provider specified.     DISCHARGE MEDICATIONS:  New Prescriptions    No medications on file       Kita Bowen DO  Emergency Medicine Resident    (Please note that portions of this note were completed with a voice recognition program.Efforts were made to edit the dictations but occasionally words are mis-transcribed.)        Dara Choudhury DO  Resident  04/25/20 3823

## 2020-04-25 NOTE — ED NOTES
Arrival to ED and taken straight to ct scan.  Neuro at bedside upon arrival     Jonathon Daily RN  04/25/20 9567

## 2020-04-25 NOTE — CONSULTS
History     Socioeconomic History    Marital status: Not on file     Spouse name: Not on file    Number of children: Not on file    Years of education: Not on file    Highest education level: Not on file   Occupational History    Not on file   Social Needs    Financial resource strain: Not on file    Food insecurity     Worry: Not on file     Inability: Not on file    Transportation needs     Medical: Not on file     Non-medical: Not on file   Tobacco Use    Smoking status: Not on file   Substance and Sexual Activity    Alcohol use: Not on file    Drug use: Not on file    Sexual activity: Not on file   Lifestyle    Physical activity     Days per week: Not on file     Minutes per session: Not on file    Stress: Not on file   Relationships    Social connections     Talks on phone: Not on file     Gets together: Not on file     Attends Yazidism service: Not on file     Active member of club or organization: Not on file     Attends meetings of clubs or organizations: Not on file     Relationship status: Not on file    Intimate partner violence     Fear of current or ex partner: Not on file     Emotionally abused: Not on file     Physically abused: Not on file     Forced sexual activity: Not on file   Other Topics Concern    Not on file   Social History Narrative    Not on file       Family History:   No family history on file. Allergies:  Patient has no allergy information on record.     Home Medications:  Prior to Admission medications    Not on File       REVIEW OF SYSTEMS:       Patient is aphasic but has right sided weakness, dysarthria    PHYSICAL EXAM:       BP (!) 210/115   Pulse 85   Temp 98.1 °F (36.7 °C) (Oral)   Resp 20   Ht 5' 10\" (1.778 m)   Wt (!) 320 lb (145.2 kg)   SpO2 96%   BMI 45.92 kg/m²     CONSTITUTIONAL:  Alert, aphasic, dysarthric   HEAD:  normocephalic, atraumatic    EYES:  PERRLA, left gaze paralysis   ENT:  moist mucous membranes   NECK:  supple, symmetric, no midline tenderness to palpation    BACK:  without midline tenderness, step-offs or deformities    LUNGS:  Equal air entry bilaterally   CARDIOVASCULAR:  normal s1 / s2   ABDOMEN:  Soft, no rigidity   NEUROLOGIC:  Mental Status:  aphasic,awake             Cranial Nerves:    Left gaze paralysis, right facial droop    Motor Exam:    Drift:  Absent  Tone:  abnormal - decreased tone RUE and RLE    RUE: 0/5  LUE: 5/5  RLE: 0/5  LLE: 0/5    Sensory:    Touch:    Right Upper Extremity:  normal  Left Upper Extremity:  normal  Right Lower Extremity:  normal  Left Lower Extremity:  normal    Deep Tendon Reflexes:    Right Bicep:  2+  Left Bicep:  2+  Right Knee:  2+  Left Knee:  2+    Plantar Response:  Right:  downgoing  Left:  downgoing    Coordination/Dysmetria:            Unable to assess due to aphasia    Gait:  Deferred    INITIAL NIH STROKE SCALE:    Time Performed:  7:50 AM     1a. Level of consciousness:  0 - alert; keenly responsive  1b. Level of consciousness questions:  2 - answers neither question correctly  1c. Level of consciousness questions:  2 - performs neither task correctly  2. Best Gaze:  2 - forced deviation, or total gaze paresis not overcome by oculocephalic maneuver  3. Visual:  0 - no visual loss  4. Facial Palsy:  2 - partial paralysis (total or near total paralysis of the lower face)  5a. Motor left arm:  0 - no drift, limb holds 90 (or 45) degrees for full 10 seconds  5b. Motor right arm:  4 - no movement  6a. Motor left le - no drift; leg holds 30 degree position for full 5 seconds  6b. Motor right leg:  3 - no effort against gravity; leg falls to bed immediately  7. Limb Ataxia:  0 - absent  8. Sensory:  0 - normal; no sensory loss  9. Best Language:  3 - mute, global aphasia; no usable speech or auditory comprehension  10.   Dysarthria:  2 - severe; patient speech is so slurred as to be unintelligible in the absence of or our of proportion to any dysphagia, or is mute/anarthric   11. Extinction and Inattention:  0 - no abnormality    TOTAL:  20     SKIN:  no rash      Modified Talbot Score Scale:     [] Zero: No symptoms at all   [] 1: No significant disability despite symptoms; able to carry out all usual duties and activities   [x] 2: Slight disability; unable to carry out all previous activities, but able to look after own affairs without assistance   [] 3:Moderate disability; requiring some help, but able to walk without assistance   [] 4: Moderately severe disability; unable to walk and attend to bodily needs without assistance   [] 5:Severe disability; bedridden, incontinent and requiring constant nursing care and attention    LABS AND IMAGING:     CBC with Differential:    Lab Results   Component Value Date    WBC 8.1 04/25/2020    RBC 4.88 04/25/2020    HGB 14.3 04/25/2020    HCT 43.7 04/25/2020     04/25/2020    MCV 89.5 04/25/2020    MCH 29.3 04/25/2020    MCHC 32.7 04/25/2020    RDW 14.4 04/25/2020    LYMPHOPCT 12 04/25/2020    MONOPCT 5 04/25/2020    BASOPCT 1 04/25/2020    MONOSABS 0.40 04/25/2020    LYMPHSABS 0.94 04/25/2020    EOSABS 0.09 04/25/2020    BASOSABS 0.09 04/25/2020    DIFFTYPE NOT REPORTED 04/25/2020     BMP:    Lab Results   Component Value Date     04/25/2020    K 3.8 04/25/2020     04/25/2020    CO2 21 04/25/2020    BUN 14 04/25/2020    CREATININE 1.06 04/25/2020    CALCIUM 8.0 04/25/2020    GFRAA >60 04/25/2020    LABGLOM >60 04/25/2020    GLUCOSE 160 04/25/2020       Radiology Review:    1.) CT Head without contrast:  Impression   1. Motion partially limits evaluation. 2. No convincing acute intracranial abnormality. 3. There is question of a small chronic infarct involving the left parietal   lobe.    Findings were acute acute to Dr. Frankie Mobley on 4/25/2020 at 7:27 am.         ASSESSMENT AND PLAN:       Assessment                 78 y.o. male who presents with above Right-sided weakness, right facial droop, aphasia, left gaze forced palsy. History of A. fib and no anticoagulation.    //Suspected left MCA thrombus//   Patient will be evaluated with CTA head and neck to evaluate for thrombus. If is positive patient will go to mechanical thrombectomy. Patient with a history of A. fib and no anticoagulation. Currently on telemetry with A. fib    1. Last Known Well (date and time): 2AM on 4/25/2020    2. Candidate for IV tPA therapy     Wake up stroke - Will evaluate with Brain MRI     Contraindications for IV tPA:   ? Symptom onset is unknown, > 4.5 hours, or if patient awoke with stroke   ? Acute or previous intracranial hemorrhage   ? Imaging showing extensive regions of irreversible injury (hypoattenuation)   ? Prior ischemic stroke, severe head trauma, or intracranial/intraspinal surgery within 3 months   ? Symptoms of subarachnoid hemorrhage   ? GI malignancy or GI bleed within 21 days   ? Coagulopathy: (Platelets < 276, 435/VIJAY³, INR > 1.7, aPTT > 40 s, PT > 15 s)   ? Treatment dose of low molecular weight heparin within 24 hours (does not apply to prophylactic doses to prevent VTE)   ? Use of anticoagulant drugs (thrombin inhibitors and factor Xa inhibitors) unless labs are normal or when patient has not taken for >48 hours with normal renal function   ? Use of antiplatelet that inhibits glycoprotein IIb/IIIa receptors (except in clinical trials)   ? Infective endocarditis due to increased risk of intracranial hemorrhage   ? Aortic arch dissection   ? Intra-axial (inside the brain tissue) intracranial neoplasm   ? Persistent elevated blood pressure (systolic > 094 mm Hg or diastolic > 966 mm Hg)   ? Active internal bleeding      3.  Candidate for Thrombectomy    \Will evaluate CTA H/N    - Discussed with Dr. Abram Heredia     Recommendations:      - Mark Jean-Baptiste - STAT    - TTE with Bubble Study  - Possible thrombectomy if CTA positive for clot  - Will do MRI to evaluate flair vs DWI for possible

## 2020-04-25 NOTE — OP NOTE
[facility]    NEUROENDOVASCULAR SERVICE: POST-OP NOTE: 4/25/2020    Pt Name: Marylen Howard  MRN: [id number]  YOB: 1950  Date of Procedure: 4/25/2020  Primary Care Physician: No primary care provider on file. Pre-Procedural Diagnosis: Left M2 occlusion  Post-Procedural Diagnosis: Same      Procedure Performed:Cerebral angiogram with mechanical thrombectomy of the Of the left M2 MCA    Surgeon:   David Bishop MD    Fellow: Scott Yeung MD     1st Assistant:  Say Palomo    PRE-PROCEDURAL EXAM:  Prestroke baseline mRS MODIFIED MARIYA SCORE: 0 - No symptoms at all. Neurological exam performed and unchanged from initial H&P or consult  CT head ASPECT score: 9    Anesthesia: MAC anesthesia  Complications: none    Intra-Operative EXAM:  Neurological exam performed and unchanged from initial H&P or consult    EBL: < Minimal      Cc            Specimens: Were not Obtained  Contrast:     Visipaque 270 low osmolar 110 Cc             Fluoro: 30.4 min    Findings:  Please see dictated Radiology note for further details  1. Left M2 MCA complete occlusion likely due to cardioembolic event treated with 3 passes of mechanical thrombectomy using solitaire 4 x 40 mm and large bore reperfusion catheter resulted in TICI 2C with persistent distal and 6 occlusion not amenable to mechanical thrombectomy. POST-PROCEDURAL EXAM :   Stable neurological Exam  Neurological exam performed and unchanged from initial H&P or consult    Closure:  right Angioseal 8   F        POST-PROCEDURAL MONITORING : see orders  Disposition: Neuro ICU      Recommendations:  Back to NSICU. Do not bend right leg for 3 hours. Groin checks per protocol. Neuro checks per icu protocol. Peripheral pulse checks per protocol.   ICU management per NSICU team.   SBP goal 130-170  Patient will likely need anticoagulation  Upon discharge follow up with Dr. Bran Robbins in 2 weeks and Dr. Chao Rodriguez in 3 months with CTA head & neck with contrast.        MD Caitie Roth MD   Pager: [phone]  Stroke, [facility]  [facility] [address]  Electronically signed 4/25/2020 at 11:02 AM

## 2020-04-25 NOTE — SEDATION DOCUMENTATION
Patient to neuro icu , patient pedal pulse on right via doppler, unable to obtain the pedal pulse to the left foot, Neuro Critical care notified

## 2020-04-26 ENCOUNTER — APPOINTMENT (OUTPATIENT)
Dept: GENERAL RADIOLOGY | Age: 70
DRG: 023 | End: 2020-04-26
Payer: MEDICARE

## 2020-04-26 LAB
ABSOLUTE EOS #: 0.05 K/UL (ref 0–0.44)
ABSOLUTE IMMATURE GRANULOCYTE: 0.04 K/UL (ref 0–0.3)
ABSOLUTE LYMPH #: 1.32 K/UL (ref 1.1–3.7)
ABSOLUTE MONO #: 0.56 K/UL (ref 0.1–1.2)
ANION GAP SERPL CALCULATED.3IONS-SCNC: 11 MMOL/L (ref 9–17)
BASOPHILS # BLD: 1 % (ref 0–2)
BASOPHILS ABSOLUTE: 0.07 K/UL (ref 0–0.2)
BUN BLDV-MCNC: 10 MG/DL (ref 8–23)
BUN/CREAT BLD: ABNORMAL (ref 9–20)
CALCIUM SERPL-MCNC: 8.2 MG/DL (ref 8.6–10.4)
CHLORIDE BLD-SCNC: 101 MMOL/L (ref 98–107)
CO2: 23 MMOL/L (ref 20–31)
CREAT SERPL-MCNC: 1.01 MG/DL (ref 0.7–1.2)
DIFFERENTIAL TYPE: ABNORMAL
EOSINOPHILS RELATIVE PERCENT: 1 % (ref 1–4)
ESTIMATED AVERAGE GLUCOSE: 114 MG/DL
GFR AFRICAN AMERICAN: >60 ML/MIN
GFR NON-AFRICAN AMERICAN: >60 ML/MIN
GFR SERPL CREATININE-BSD FRML MDRD: ABNORMAL ML/MIN/{1.73_M2}
GFR SERPL CREATININE-BSD FRML MDRD: ABNORMAL ML/MIN/{1.73_M2}
GLUCOSE BLD-MCNC: 113 MG/DL (ref 75–110)
GLUCOSE BLD-MCNC: 118 MG/DL (ref 75–110)
GLUCOSE BLD-MCNC: 129 MG/DL (ref 70–99)
GLUCOSE BLD-MCNC: 137 MG/DL (ref 75–110)
HBA1C MFR BLD: 5.6 % (ref 4–6)
HCT VFR BLD CALC: 39.4 % (ref 40.7–50.3)
HEMOGLOBIN: 13.1 G/DL (ref 13–17)
IMMATURE GRANULOCYTES: 0 %
LYMPHOCYTES # BLD: 13 % (ref 24–43)
MAGNESIUM: 2.3 MG/DL (ref 1.6–2.6)
MCH RBC QN AUTO: 29.1 PG (ref 25.2–33.5)
MCHC RBC AUTO-ENTMCNC: 33.2 G/DL (ref 28.4–34.8)
MCV RBC AUTO: 87.6 FL (ref 82.6–102.9)
MONOCYTES # BLD: 5 % (ref 3–12)
MRSA, DNA, NASAL: NORMAL
NRBC AUTOMATED: 0 PER 100 WBC
PARTIAL THROMBOPLASTIN TIME: 31.1 SEC (ref 20.5–30.5)
PARTIAL THROMBOPLASTIN TIME: 34.1 SEC (ref 20.5–30.5)
PARTIAL THROMBOPLASTIN TIME: 39 SEC (ref 20.5–30.5)
PARTIAL THROMBOPLASTIN TIME: 39.7 SEC (ref 20.5–30.5)
PDW BLD-RTO: 14.6 % (ref 11.8–14.4)
PLATELET # BLD: 185 K/UL (ref 138–453)
PLATELET ESTIMATE: ABNORMAL
PMV BLD AUTO: 11.2 FL (ref 8.1–13.5)
POTASSIUM SERPL-SCNC: 3.9 MMOL/L (ref 3.7–5.3)
RBC # BLD: 4.5 M/UL (ref 4.21–5.77)
RBC # BLD: ABNORMAL 10*6/UL
SEG NEUTROPHILS: 80 % (ref 36–65)
SEGMENTED NEUTROPHILS ABSOLUTE COUNT: 8.24 K/UL (ref 1.5–8.1)
SODIUM BLD-SCNC: 135 MMOL/L (ref 135–144)
SPECIMEN DESCRIPTION: NORMAL
T3 FREE: 2.27 PG/ML (ref 2.02–4.43)
TSH SERPL DL<=0.05 MIU/L-ACNC: 2.38 MIU/L (ref 0.3–5)
WBC # BLD: 10.3 K/UL (ref 3.5–11.3)
WBC # BLD: ABNORMAL 10*3/UL

## 2020-04-26 PROCEDURE — 94761 N-INVAS EAR/PLS OXIMETRY MLT: CPT

## 2020-04-26 PROCEDURE — 71045 X-RAY EXAM CHEST 1 VIEW: CPT

## 2020-04-26 PROCEDURE — 84481 FREE ASSAY (FT-3): CPT

## 2020-04-26 PROCEDURE — 6360000002 HC RX W HCPCS: Performed by: STUDENT IN AN ORGANIZED HEALTH CARE EDUCATION/TRAINING PROGRAM

## 2020-04-26 PROCEDURE — 82947 ASSAY GLUCOSE BLOOD QUANT: CPT

## 2020-04-26 PROCEDURE — 6360000002 HC RX W HCPCS: Performed by: NURSE PRACTITIONER

## 2020-04-26 PROCEDURE — 84443 ASSAY THYROID STIM HORMONE: CPT

## 2020-04-26 PROCEDURE — C9248 INJ, CLEVIDIPINE BUTYRATE: HCPCS | Performed by: STUDENT IN AN ORGANIZED HEALTH CARE EDUCATION/TRAINING PROGRAM

## 2020-04-26 PROCEDURE — 6370000000 HC RX 637 (ALT 250 FOR IP): Performed by: STUDENT IN AN ORGANIZED HEALTH CARE EDUCATION/TRAINING PROGRAM

## 2020-04-26 PROCEDURE — 85730 THROMBOPLASTIN TIME PARTIAL: CPT

## 2020-04-26 PROCEDURE — 99233 SBSQ HOSP IP/OBS HIGH 50: CPT | Performed by: PSYCHIATRY & NEUROLOGY

## 2020-04-26 PROCEDURE — 99291 CRITICAL CARE FIRST HOUR: CPT | Performed by: PSYCHIATRY & NEUROLOGY

## 2020-04-26 PROCEDURE — 2000000003 HC NEURO ICU R&B

## 2020-04-26 PROCEDURE — 6370000000 HC RX 637 (ALT 250 FOR IP): Performed by: NURSE PRACTITIONER

## 2020-04-26 PROCEDURE — 87086 URINE CULTURE/COLONY COUNT: CPT

## 2020-04-26 PROCEDURE — 93005 ELECTROCARDIOGRAM TRACING: CPT | Performed by: NURSE PRACTITIONER

## 2020-04-26 PROCEDURE — 80048 BASIC METABOLIC PNL TOTAL CA: CPT

## 2020-04-26 PROCEDURE — 2580000003 HC RX 258: Performed by: STUDENT IN AN ORGANIZED HEALTH CARE EDUCATION/TRAINING PROGRAM

## 2020-04-26 PROCEDURE — 83735 ASSAY OF MAGNESIUM: CPT

## 2020-04-26 PROCEDURE — 85025 COMPLETE CBC W/AUTO DIFF WBC: CPT

## 2020-04-26 RX ORDER — SENNA AND DOCUSATE SODIUM 50; 8.6 MG/1; MG/1
2 TABLET, FILM COATED ORAL DAILY
Status: DISCONTINUED | OUTPATIENT
Start: 2020-04-26 | End: 2020-05-03 | Stop reason: HOSPADM

## 2020-04-26 RX ORDER — CHLORHEXIDINE GLUCONATE 0.12 MG/ML
15 RINSE ORAL 2 TIMES DAILY
Status: DISCONTINUED | OUTPATIENT
Start: 2020-04-26 | End: 2020-05-03 | Stop reason: HOSPADM

## 2020-04-26 RX ORDER — FUROSEMIDE 10 MG/ML
10 INJECTION INTRAMUSCULAR; INTRAVENOUS ONCE
Status: COMPLETED | OUTPATIENT
Start: 2020-04-26 | End: 2020-04-26

## 2020-04-26 RX ORDER — ATORVASTATIN CALCIUM 40 MG/1
40 TABLET, FILM COATED ORAL NIGHTLY
Status: DISCONTINUED | OUTPATIENT
Start: 2020-04-26 | End: 2020-05-01

## 2020-04-26 RX ADMIN — HYDRALAZINE HYDROCHLORIDE 10 MG: 20 INJECTION INTRAMUSCULAR; INTRAVENOUS at 17:11

## 2020-04-26 RX ADMIN — CLEVIPIDINE 12 MG/HR: 0.5 EMULSION INTRAVENOUS at 20:51

## 2020-04-26 RX ADMIN — SODIUM CHLORIDE, PRESERVATIVE FREE 10 ML: 5 INJECTION INTRAVENOUS at 08:45

## 2020-04-26 RX ADMIN — SODIUM CHLORIDE, PRESERVATIVE FREE 10 ML: 5 INJECTION INTRAVENOUS at 21:34

## 2020-04-26 RX ADMIN — HEPARIN SODIUM 14.9 UNITS/KG/HR: 10000 INJECTION, SOLUTION INTRAVENOUS at 23:33

## 2020-04-26 RX ADMIN — DESMOPRESSIN ACETATE 40 MG: 0.2 TABLET ORAL at 21:33

## 2020-04-26 RX ADMIN — STANDARDIZED SENNA CONCENTRATE AND DOCUSATE SODIUM 2 TABLET: 8.6; 5 TABLET ORAL at 21:32

## 2020-04-26 RX ADMIN — ASPIRIN 81 MG: 81 TABLET, COATED ORAL at 08:45

## 2020-04-26 RX ADMIN — HEPARIN SODIUM 10.9 UNITS/KG/HR: 10000 INJECTION, SOLUTION INTRAVENOUS at 10:42

## 2020-04-26 RX ADMIN — METOPROLOL TARTRATE 12.5 MG: 25 TABLET ORAL at 21:33

## 2020-04-26 RX ADMIN — Medication 15 ML: at 21:33

## 2020-04-26 RX ADMIN — FUROSEMIDE 10 MG: 10 INJECTION, SOLUTION INTRAMUSCULAR; INTRAVENOUS at 13:23

## 2020-04-26 ASSESSMENT — PAIN SCALES - WONG BAKER
WONGBAKER_NUMERICALRESPONSE: 0
WONGBAKER_NUMERICALRESPONSE: 0

## 2020-04-26 ASSESSMENT — PAIN SCALES - GENERAL: PAINLEVEL_OUTOF10: 0

## 2020-04-26 NOTE — PROGRESS NOTES
Neuro critical care:     Acute ischemic stroke   Status post thrombectomy   New onset A fibb       MRI brain shows left frontal-insular cortical stroke area  Heparin infusion - CT head in AM 4/27 once therapeutic and oral anticoagulation can be started subsequently   Neuro exam improved-patient is more alert and is following simple commands consistently, some right lower extremity movements but otherwise dense right hemiparesis is unchanged  Lipitor 40 mg x 2 weeks and then 10 mg- LDL is < 70   Repeat EKG today   SBP target 120-170   EKG repeated and reviewed.  Start low dose B blockers for rate control, cardene infusion being weaned   Wound care for lower extremities   Get PT, OT and speech and swallow evaluations  Awaiting echocardiogram study    CCT time spent 50 minutes excluding procedural time     Linda Mcgregor MD

## 2020-04-26 NOTE — PROGRESS NOTES
0145: Patients SBP dropped below goal to the 110's. Writer performed a neuro check and found the patient to be symptomatic. Now unable to move RLE. Patient was also more drowsy compared to previous assessment. Writer stopped the patients Cleviprex, and notified on Call Neuro Crit Care resident. Writer was instructed to Continue to monitor. Patient BP Came up to goal, and patient was again able to move RLE to command. Will continue to monitor.

## 2020-04-26 NOTE — PLAN OF CARE
Neuro assessment completed, fall precautions in place, aspirations precautions in place, assess for barriers in communication and mobility, interventions to assist in communication and mobility in place, encouraged to call for assistance, adaptive devices used as needed, assess emotional state and support offered, encouraged patient to communicate by available means, and support systems included in patient care. Patient remained free from injury. Patient verbalized understanding of need for the safety precautions. Demonstrates proper use of assistive devices. Bed remains in the lowest position. Call light remains within reach. [place] Program in use.     Problem: Falls - Risk of:  Goal: Will remain free from falls  Description: Will remain free from falls  Outcome: Met This Shift  Goal: Absence of physical injury  Description: Absence of physical injury  Outcome: Met This Shift     Problem: HEMODYNAMIC STATUS  Goal: Patient has stable vital signs and fluid balance  Outcome: Ongoing     Problem: ACTIVITY INTOLERANCE/IMPAIRED MOBILITY  Goal: Mobility/activity is maintained at optimum level for patient  Outcome: Ongoing     Problem: COMMUNICATION IMPAIRMENT  Goal: Ability to express needs and understand communication  Outcome: Ongoing

## 2020-04-26 NOTE — PROGRESS NOTES
0145 (!) 112/55 -- -- 59 23 93 %   04/26/20 0130 119/61 -- -- 63 21 92 %   04/26/20 0115 126/62 -- -- 68 20 92 %   04/26/20 0100 (!) 133/45 -- -- 61 22 92 %   04/26/20 0045 (!) 106/46 -- -- 61 21 93 %   04/26/20 0030 (!) 145/53 -- -- 62 20 93 %   04/26/20 0020 (!) 154/65 -- -- 71 15 91 %   04/26/20 0000 (!) 142/53 98.1 °F (36.7 °C) Oral 70 18 93 %   04/25/20 2345 (!) 159/66 -- -- 75 20 96 %   04/25/20 2330 (!) 161/86 -- -- 72 18 95 %   04/25/20 2315 (!) 166/68 -- -- 77 21 96 %   04/25/20 2300 (!) 163/70 -- -- 67 21 --   04/25/20 2245 (!) 146/74 -- -- 63 21 94 %   04/25/20 2230 (!) 130/59 -- -- 63 21 95 %   04/25/20 2215 (!) 157/76 -- -- 73 21 95 %   04/25/20 2200 (!) 170/64 -- -- 76 18 97 %   04/25/20 2145 (!) 152/62 -- -- 73 20 96 %   04/25/20 2130 (!) 164/73 -- -- 74 22 96 %   04/25/20 2115 (!) 141/73 -- -- 89 24 --   04/25/20 2100 (!) 163/90 -- -- 66 22 97 %   04/25/20 2045 (!) 142/71 -- -- 61 19 95 %   04/25/20 2030 (!) 159/83 -- -- 72 16 96 %   04/25/20 2015 (!) 175/69 -- -- 75 17 96 %   04/25/20 2000 (!) 163/87 98 °F (36.7 °C) Oral 76 18 93 %     Estimated body mass index is 45.92 kg/m² as calculated from the following:    Height as of this encounter: 5' 10\" (1.778 m). Weight as of this encounter: 320 lb (145.2 kg).  []<16 Severe malnutrition  []16-16.99 Moderate malnutrition  []17-18.49 Mild malnutrition  []18.5-24.9 Normal  []25-29.9 Overweight (not obese)  []30-34.9 Obese class 1 (Low Risk)  []35-39.9 Obese class 2 (Moderate Risk)  [x]? 40 Obese class 3 (High Risk)    RECENT LABS:   Lab Results   Component Value Date    WBC 10.3 04/26/2020    HGB 13.1 04/26/2020    HCT 39.4 (L) 04/26/2020     04/26/2020    CHOL 111 04/25/2020    TRIG 80 04/25/2020    HDL 46 04/25/2020     04/26/2020    K 3.9 04/26/2020     04/26/2020    CREATININE 1.01 04/26/2020    BUN 10 04/26/2020    CO2 23 04/26/2020    INR 1.1 04/25/2020    LABA1C 5.6 04/25/2020     24 HOUR INTAKE/OUTPUT:    Intake/Output Summary (Last 24 hours) at 4/26/2020 0757  Last data filed at 4/26/2020 0730  Gross per 24 hour   Intake 2445 ml   Output 6631 ml   Net -4186 ml       IMAGING:   Xr Abdomen (kub) (single Ap View)    Result Date: 4/25/2020  EXAMINATION: ONE SUPINE XRAY VIEW(S) OF THE ABDOMEN 4/25/2020 8:50 am COMPARISON: None. HISTORY: ORDERING SYSTEM PROVIDED HISTORY: look for metal for MRI TECHNOLOGIST PROVIDED HISTORY: look for metal for MRI FINDINGS: There is a nonobstructive bowel gas pattern. Degenerative changes involve the lumbar spine and bilateral hips. There is no radiopaque foreign body. 1. No acute abnormality. Ct Head Wo Contrast    Result Date: 4/25/2020  EXAMINATION: CT OF THE HEAD WITHOUT CONTRAST  4/25/2020 7:03 am TECHNIQUE: CT of the head was performed without the administration of intravenous contrast. Dose modulation, iterative reconstruction, and/or weight based adjustment of the mA/kV was utilized to reduce the radiation dose to as low as reasonably achievable. COMPARISON: None. HISTORY: ORDERING SYSTEM PROVIDED HISTORY: Stroke TECHNOLOGIST PROVIDED HISTORY: Stroke Initial evaluation. FINDINGS: Motion degrades images limiting evaluation. BRAIN/VENTRICLES: There is no acute intracranial hemorrhage, mass effect or midline shift. No abnormal extra-axial fluid collection. There is question of a small chronic infarct involving the left parietal lobe. Otherwise, the gray-white differentiation is maintained without evidence of an acute infarct. There is no evidence of hydrocephalus. There are areas of hypoattenuation in the periventricular and subcortical white matter, which is nonspecific, but may represent chronic microvasvular ischemic change. ORBITS: The visualized portion of the orbits demonstrate no acute abnormality. SINUSES: A mucous retention cyst is seen in the left maxillary sinus. The mastoid air cells demonstrate no acute abnormality.  SOFT TISSUES/SKULL:  No acute abnormality of the visualized Alfredo Ventura was present for all procedural and imaging components of this case. Examination was reviewed and reported findings confirmed and evaluated by Dr. Alfredo Ventura. Xr Chest Portable    Result Date: 4/26/2020  EXAMINATION: ONE XRAY VIEW OF THE CHEST 4/26/2020 5:58 am COMPARISON: April 25, 2020 HISTORY: ORDERING SYSTEM PROVIDED HISTORY: Eval lungs, fluid/congestion TECHNOLOGIST PROVIDED HISTORY: Eval lungs, fluid/congestion FINDINGS: Shallow inspiration. Pulmonary vascular congestion with interstitial edema. Small left effusion. No pneumothorax. The heart remains enlarged. Similar vascular congestion/edema. Xr Chest Portable    Result Date: 4/25/2020  EXAMINATION: ONE SUPINE XRAY VIEW(S) OF THE ABDOMEN; ONE XRAY VIEW OF THE CHEST 4/25/2020 2:44 pm COMPARISON: 04/25/2020 HISTORY: ORDERING SYSTEM PROVIDED HISTORY: Confirmation of course of NG/OG/NE tube and location of tip of tube TECHNOLOGIST PROVIDED HISTORY: Confirmation of course of NG/OG/NE tube and location of tip of tube Portable? ->Yes Reason for Exam: NG placement/  AP supine Acuity: Unknown Type of Exam: Unknown FINDINGS: Chest: Shallow inspiration. Cardiomegaly. Enteric tube passes beneath the diaphragm. Pulmonary vascular congestion. No effusion. No pneumothorax. No acute osseous abnormality. Abdomen: Enteric tube courses into the expected location of the stomach. The side port is just beyond the GE junction. About 5 cm for better positioning. The visualized bowel is normal.  Probable splenomegaly. Enteric tube terminates in the expected location of the stomach with the side port is near the GE junction. Consider advancing about 5 cm for better position. Cardiomegaly and pulmonary vascular congestion may be magnified in part by shallow inspiration but an element of volume overload is considered. Probable splenomegaly.      Xr Chest Portable    Result Date: 4/25/2020  EXAMINATION: ONE XRAY VIEW OF THE CHEST 4/25/2020 8:50 am COMPARISON: near the vertex. No significant mass effect or midline shift. 1. Occlusion involving a proximal left M2 branch at the level of the left sylvian fissure. There appears to be delayed enhancement of the distal branches. 2. There is question of blurring of the gray-white differentiation involving the left insular cortex as well as the left frontal lobe near the vertex. 3. No focal stenosis otherwise seen of the [id number]. 4. No flow limiting stenosis of the cervical carotid/vertebral arteries. Findings were communicated to Dr. William Manuel on 4/25/2020 at 8:36 am.     Mri Brain Wo Contrast    Result Date: 4/25/2020  EXAMINATION: MRI OF THE BRAIN WITHOUT CONTRAST  4/25/2020 3:33 pm TECHNIQUE: Multiplanar multisequence MRI of the brain was performed without the administration of intravenous contrast. COMPARISON: None. HISTORY: ORDERING SYSTEM PROVIDED HISTORY: possible wake up stroke, evaluate for left sided stroke TECHNOLOGIST PROVIDED HISTORY: possible wake up stroke, evaluate for left sided stroke Initial evaluation FINDINGS: INTRACRANIAL STRUCTURES/VENTRICLES: There is an area of acute infarct involving the left insular cortex and left frontotemporal region. There is a subtle area of acute infarct in the left parietooccipital lobe. No other areas of restricted diffusion are identified. Motion artifact degrades the images. The ventricles and cisternal spaces are prominent consistent with cerebral atrophy. There are multiple and confluent areas of high signal in the periventricular white matter and centrum semiovale that are likely related to chronic small vessel ischemic disease. There is no midline shift or mass effect. ORBITS: The visualized portion of the orbits demonstrate no acute abnormality. SINUSES: There is a mucous retention cyst or polyp in the left maxillary sinus. The remainder of the sinuses are clear. The mastoid air cells are clear.  BONES/SOFT TISSUES: The bone marrow signal intensity consciousness:  0 - alert; keenly responsive  1b. Level of consciousness questions:  2 - answers neither question correctly  1c. Level of consciousness questions:  0 - performs both tasks correctly  2. Best Gaze:  1 - partial gaze palsy  3. Visual:  2 - complete hemianopia  4. Facial Palsy:  2 - partial paralysis (total or near total paralysis of the lower face)  5a. Motor left arm:  0 - no drift, limb holds 90 (or 45) degrees for full 10 seconds  5b. Motor right arm:  4 - no movement  6a. Motor left le - no drift; leg holds 30 degree position for full 5 seconds  6b. Motor right le - drift; leg falls by the end of the 5 second period but does not hit bed  7. Limb Ataxia:  0 - absent  8. Sensory:  2 - severe to total sensory loss; patient is not aware of being touched in face, arm, leg  9. Best Language:  3 - mute, global aphasia; no usable speech or auditory comprehension  10. Dysarthria:  2 - severe; patient speech is so slurred as to be unintelligible in the absence of or our of proportion to any dysphagia, or is mute/anarthric   11. Extinction and Inattention:  1 - visual, tactile, auditory, spatial or personal inattention or extinction to bilateral simultaneous stimulation in one of the sensory modalities   TOTAL: 20    DRAINS:  [x] There are no drains for Neuro Critical Care to monitor at this time. ASSESSMENT AND PLAN:       The patient is a 79 yo male with a history of afib not on AC who presented to [address] [place] [address] ED after waking up with left MCA syndrome. Out of window for tPA. Taken for L M2 mechanical thrombectomy, TICI 2C.     NEUROLOGIC:  - Left MCA territory infarction in setting of left M2 MCA occlusion  - Etiology likely cardioembolic in setting of afib  - POD #1 s/p mechanical thrombectomy left M2, TICI 2C  - Asprin 81mg QD, Lipitor 40mg QHS x2weeks then lower dose  - Continue Low dose heparin infusion  - CT Head in AM if PTT therapeutic  - Goal -170  - Neuro checks per protocol    CARDIOVASCULAR:  - Goal -170  - Clevidipine infusion  - PRN Hydralazine/Labetalol  - Severe, symptomatic bradycardia post op resolved with Atropine  - EKG afib right BBB, left anterior fascicular block  - Repeat EKG today afib, right BBB no longer present  - Chronic atrial fibrillation, rate controlled  - Start Lopressor 12.5mg BID, hold HR<60  - On Heparin infusion, will need oral AC for secondary stroke prevention  - F/U Echo  - Lipid panel; LDL 49, Cholesterol 111  - Lipitor 40mg QHS x2 weeks then decrease dose  - Continue telemetry    PULMONARY:  - Maintaining O2 sats on 0-2L nasal canula  - Mild vascular congestion on CXR  - Given Lasix 10mg IVP x1    RENAL/FLUID/ELECTROLYTE:  - Normal renal functioning   - BUN 10/ Creatinine 1.01  - Monitor I&O; 2285/6270  - OK to remove johnson post diuresis  - Stop maintenance IVF  - Replace electrolytes PRN  - Daily BMP    GI/NUTRITION:  NUTRITION:  Diet NPO Effective Now   - NG tube in place, consider tube feeds  - Speech bedside swallow evaluation  - Bowel regimen: Senokot-S daily  - GI prophylaxis: Not indicated    ID:  - Afebrile, Tmax 37.0  - No leukocytosis, WBC 10.3  - Infectious work up; UA negative  - Continue to monitor for fevers  - Daily CBC    HEME:   - H&H 13.1/39.4  - Platelets 304  - Daily CBC    ENDOCRINE:  - Continue to monitor blood glucose, goal <180  - Hemoglobin A1C 5.6  - TSH 2.38/T3 2.27    OTHER:  - PT/OT/ST  - Code Status: FULL    PROPHYLAXIS:  Stress ulcer: N/A    DVT PROPHYLAXIS:  - SCD sleeves - Thigh High   - On heparin infusion    DISPOSITION:  [x] To remain ICU for close neurological monitoring post thrombectomy. We will continue to follow along. For any changes in exam or patient status please contact Neuro Critical Care.       MEGHA Lopez - [facility]  Neuro Critical Care  Pager [phone]  4/26/2020     7:57 AM

## 2020-04-27 ENCOUNTER — APPOINTMENT (OUTPATIENT)
Dept: GENERAL RADIOLOGY | Age: 70
DRG: 023 | End: 2020-04-27
Payer: MEDICARE

## 2020-04-27 ENCOUNTER — APPOINTMENT (OUTPATIENT)
Dept: CT IMAGING | Age: 70
DRG: 023 | End: 2020-04-27
Payer: MEDICARE

## 2020-04-27 LAB
-: NORMAL
ABSOLUTE EOS #: 0.16 K/UL (ref 0–0.44)
ABSOLUTE IMMATURE GRANULOCYTE: 0.06 K/UL (ref 0–0.3)
ABSOLUTE LYMPH #: 1.55 K/UL (ref 1.1–3.7)
ABSOLUTE MONO #: 0.7 K/UL (ref 0.1–1.2)
ANION GAP SERPL CALCULATED.3IONS-SCNC: 15 MMOL/L (ref 9–17)
BASOPHILS # BLD: 1 % (ref 0–2)
BASOPHILS ABSOLUTE: 0.12 K/UL (ref 0–0.2)
BUN BLDV-MCNC: 11 MG/DL (ref 8–23)
BUN/CREAT BLD: ABNORMAL (ref 9–20)
CALCIUM SERPL-MCNC: 8.8 MG/DL (ref 8.6–10.4)
CHLORIDE BLD-SCNC: 98 MMOL/L (ref 98–107)
CO2: 21 MMOL/L (ref 20–31)
CREAT SERPL-MCNC: 0.79 MG/DL (ref 0.7–1.2)
CULTURE: NO GROWTH
DIFFERENTIAL TYPE: ABNORMAL
EKG ATRIAL RATE: 214 BPM
EKG ATRIAL RATE: 357 BPM
EKG ATRIAL RATE: 416 BPM
EKG Q-T INTERVAL: 342 MS
EKG Q-T INTERVAL: 360 MS
EKG Q-T INTERVAL: 386 MS
EKG QRS DURATION: 100 MS
EKG QRS DURATION: 96 MS
EKG QRS DURATION: 96 MS
EKG QTC CALCULATION (BAZETT): 405 MS
EKG QTC CALCULATION (BAZETT): 420 MS
EKG QTC CALCULATION (BAZETT): 428 MS
EKG R AXIS: -36 DEGREES
EKG R AXIS: -49 DEGREES
EKG R AXIS: -56 DEGREES
EKG T AXIS: 74 DEGREES
EKG T AXIS: 76 DEGREES
EKG T AXIS: 79 DEGREES
EKG VENTRICULAR RATE: 74 BPM
EKG VENTRICULAR RATE: 76 BPM
EKG VENTRICULAR RATE: 91 BPM
EOSINOPHILS RELATIVE PERCENT: 1 % (ref 1–4)
GFR AFRICAN AMERICAN: >60 ML/MIN
GFR NON-AFRICAN AMERICAN: >60 ML/MIN
GFR SERPL CREATININE-BSD FRML MDRD: ABNORMAL ML/MIN/{1.73_M2}
GFR SERPL CREATININE-BSD FRML MDRD: ABNORMAL ML/MIN/{1.73_M2}
GLUCOSE BLD-MCNC: 128 MG/DL (ref 70–99)
HCT VFR BLD CALC: 43.7 % (ref 40.7–50.3)
HEMOGLOBIN: 14.5 G/DL (ref 13–17)
IMMATURE GRANULOCYTES: 1 %
LYMPHOCYTES # BLD: 13 % (ref 24–43)
Lab: NORMAL
MAGNESIUM: 2.1 MG/DL (ref 1.6–2.6)
MCH RBC QN AUTO: 28.7 PG (ref 25.2–33.5)
MCHC RBC AUTO-ENTMCNC: 33.2 G/DL (ref 28.4–34.8)
MCV RBC AUTO: 86.5 FL (ref 82.6–102.9)
MONOCYTES # BLD: 6 % (ref 3–12)
NRBC AUTOMATED: 0 PER 100 WBC
PARTIAL THROMBOPLASTIN TIME: 36.4 SEC (ref 20.5–30.5)
PARTIAL THROMBOPLASTIN TIME: 46.9 SEC (ref 20.5–30.5)
PARTIAL THROMBOPLASTIN TIME: 56.5 SEC (ref 20.5–30.5)
PARTIAL THROMBOPLASTIN TIME: 60.9 SEC (ref 20.5–30.5)
PDW BLD-RTO: 14.7 % (ref 11.8–14.4)
PLATELET # BLD: 221 K/UL (ref 138–453)
PLATELET ESTIMATE: ABNORMAL
PMV BLD AUTO: 11.3 FL (ref 8.1–13.5)
POTASSIUM SERPL-SCNC: 3.9 MMOL/L (ref 3.7–5.3)
RBC # BLD: 5.05 M/UL (ref 4.21–5.77)
RBC # BLD: ABNORMAL 10*6/UL
REASON FOR REJECTION: NORMAL
SEG NEUTROPHILS: 78 % (ref 36–65)
SEGMENTED NEUTROPHILS ABSOLUTE COUNT: 9.71 K/UL (ref 1.5–8.1)
SODIUM BLD-SCNC: 134 MMOL/L (ref 135–144)
SPECIMEN DESCRIPTION: NORMAL
TROPONIN INTERP: NORMAL
TROPONIN T: NORMAL NG/ML
TROPONIN, HIGH SENSITIVITY: 17 NG/L (ref 0–22)
WBC # BLD: 12.3 K/UL (ref 3.5–11.3)
WBC # BLD: ABNORMAL 10*3/UL
ZZ NTE CLEAN UP: ORDERED TEST: NORMAL
ZZ NTE WITH NAME CLEAN UP: SPECIMEN SOURCE: NORMAL

## 2020-04-27 PROCEDURE — C9248 INJ, CLEVIDIPINE BUTYRATE: HCPCS | Performed by: STUDENT IN AN ORGANIZED HEALTH CARE EDUCATION/TRAINING PROGRAM

## 2020-04-27 PROCEDURE — 2700000000 HC OXYGEN THERAPY PER DAY

## 2020-04-27 PROCEDURE — 84484 ASSAY OF TROPONIN QUANT: CPT

## 2020-04-27 PROCEDURE — 99233 SBSQ HOSP IP/OBS HIGH 50: CPT | Performed by: PSYCHIATRY & NEUROLOGY

## 2020-04-27 PROCEDURE — 97530 THERAPEUTIC ACTIVITIES: CPT

## 2020-04-27 PROCEDURE — 6370000000 HC RX 637 (ALT 250 FOR IP): Performed by: STUDENT IN AN ORGANIZED HEALTH CARE EDUCATION/TRAINING PROGRAM

## 2020-04-27 PROCEDURE — 74230 X-RAY XM SWLNG FUNCJ C+: CPT

## 2020-04-27 PROCEDURE — 94660 CPAP INITIATION&MGMT: CPT

## 2020-04-27 PROCEDURE — 2000000003 HC NEURO ICU R&B

## 2020-04-27 PROCEDURE — 92523 SPEECH SOUND LANG COMPREHEN: CPT

## 2020-04-27 PROCEDURE — 93010 ELECTROCARDIOGRAM REPORT: CPT | Performed by: INTERNAL MEDICINE

## 2020-04-27 PROCEDURE — 2580000003 HC RX 258: Performed by: STUDENT IN AN ORGANIZED HEALTH CARE EDUCATION/TRAINING PROGRAM

## 2020-04-27 PROCEDURE — 92610 EVALUATE SWALLOWING FUNCTION: CPT

## 2020-04-27 PROCEDURE — 94761 N-INVAS EAR/PLS OXIMETRY MLT: CPT

## 2020-04-27 PROCEDURE — 6360000002 HC RX W HCPCS: Performed by: STUDENT IN AN ORGANIZED HEALTH CARE EDUCATION/TRAINING PROGRAM

## 2020-04-27 PROCEDURE — 6370000000 HC RX 637 (ALT 250 FOR IP): Performed by: NURSE PRACTITIONER

## 2020-04-27 PROCEDURE — 85025 COMPLETE CBC W/AUTO DIFF WBC: CPT

## 2020-04-27 PROCEDURE — 93005 ELECTROCARDIOGRAM TRACING: CPT | Performed by: STUDENT IN AN ORGANIZED HEALTH CARE EDUCATION/TRAINING PROGRAM

## 2020-04-27 PROCEDURE — 97162 PT EVAL MOD COMPLEX 30 MIN: CPT

## 2020-04-27 PROCEDURE — 70450 CT HEAD/BRAIN W/O DYE: CPT

## 2020-04-27 PROCEDURE — 71045 X-RAY EXAM CHEST 1 VIEW: CPT

## 2020-04-27 PROCEDURE — 85730 THROMBOPLASTIN TIME PARTIAL: CPT

## 2020-04-27 PROCEDURE — 80048 BASIC METABOLIC PNL TOTAL CA: CPT

## 2020-04-27 PROCEDURE — 92611 MOTION FLUOROSCOPY/SWALLOW: CPT

## 2020-04-27 PROCEDURE — 83735 ASSAY OF MAGNESIUM: CPT

## 2020-04-27 RX ORDER — LISINOPRIL 10 MG/1
10 TABLET ORAL DAILY
Status: DISCONTINUED | OUTPATIENT
Start: 2020-04-27 | End: 2020-05-03

## 2020-04-27 RX ORDER — FUROSEMIDE 10 MG/ML
20 INJECTION INTRAMUSCULAR; INTRAVENOUS ONCE
Status: COMPLETED | OUTPATIENT
Start: 2020-04-27 | End: 2020-04-27

## 2020-04-27 RX ADMIN — HEPARIN SODIUM 16.9 UNITS/KG/HR: 10000 INJECTION, SOLUTION INTRAVENOUS at 10:10

## 2020-04-27 RX ADMIN — METOPROLOL TARTRATE 12.5 MG: 25 TABLET ORAL at 08:28

## 2020-04-27 RX ADMIN — Medication 15 ML: at 21:48

## 2020-04-27 RX ADMIN — STANDARDIZED SENNA CONCENTRATE AND DOCUSATE SODIUM 2 TABLET: 8.6; 5 TABLET ORAL at 08:29

## 2020-04-27 RX ADMIN — SODIUM CHLORIDE, PRESERVATIVE FREE 10 ML: 5 INJECTION INTRAVENOUS at 21:48

## 2020-04-27 RX ADMIN — LISINOPRIL 10 MG: 10 TABLET ORAL at 12:18

## 2020-04-27 RX ADMIN — FUROSEMIDE 20 MG: 10 INJECTION, SOLUTION INTRAMUSCULAR; INTRAVENOUS at 17:06

## 2020-04-27 RX ADMIN — METOPROLOL TARTRATE 12.5 MG: 25 TABLET ORAL at 21:48

## 2020-04-27 RX ADMIN — ASPIRIN 81 MG: 81 TABLET, COATED ORAL at 08:27

## 2020-04-27 RX ADMIN — DESMOPRESSIN ACETATE 40 MG: 0.2 TABLET ORAL at 21:48

## 2020-04-27 RX ADMIN — CLEVIPIDINE 8 MG/HR: 0.5 EMULSION INTRAVENOUS at 00:59

## 2020-04-27 RX ADMIN — SODIUM CHLORIDE, PRESERVATIVE FREE 10 ML: 5 INJECTION INTRAVENOUS at 08:28

## 2020-04-27 RX ADMIN — Medication 15 ML: at 08:28

## 2020-04-27 ASSESSMENT — PAIN SCALES - WONG BAKER: WONGBAKER_NUMERICALRESPONSE: 0

## 2020-04-27 ASSESSMENT — PAIN SCALES - GENERAL
PAINLEVEL_OUTOF10: 0
PAINLEVEL_OUTOF10: 0

## 2020-04-27 NOTE — PROGRESS NOTES
ENDOVASCULAR NEUROSURGERY PROGRESS NOTE  4/27/2020 3:49 AM  Subjective:   Admit Date: 4/25/2020  PCP: No primary care provider on file. MRI done. Patient exam with improvement . Objective:   Vitals: BP (!) 140/65   Pulse 67   Temp 98.2 °F (36.8 °C) (Oral)   Resp 21   Ht 5' 10\" (1.778 m)   Wt (!) 320 lb (145.2 kg)   SpO2 90%   BMI 45.92 kg/m²     General appearance: Lying in bed, NAD,  Lungs: CTAB  Heart: RRR  Abdomen: soft, NTND, bowel sounds normal    Neuro exam: Follows some simple commands, left gaze preference. CN II-XII: pupils 2 mm reactive to light bilaterally, VFF. RLE antigrvaity with drift, RUE no movement otherwise  against gravity. Medications and labs:   Scheduled Meds:   atorvastatin  40 mg Oral Nightly    metoprolol tartrate  12.5 mg Oral BID    sennosides-docusate sodium  2 tablet Oral Daily    chlorhexidine  15 mL Mouth/Throat BID    sodium chloride flush  10 mL Intravenous 2 times per day    aspirin  81 mg Oral Daily    Or    aspirin  300 mg Rectal Daily     Continuous Infusions:   clevidipine 7 mg/hr (04/27/20 0346)    dextrose      heparin (porcine) 16.9 Units/kg/hr (04/27/20 0031)     CBC:   Recent Labs     04/25/20  0757 04/26/20  0609   WBC 8.1 10.3   HGB 14.3 13.1    185     BMP:    Recent Labs     04/25/20  0757 04/25/20  1235 04/26/20  0609     --  135   K 3.8  --  3.9     --  101   CO2 21  --  23   BUN 14  --  10   CREATININE 1.06 1.05 1.01   GLUCOSE 160*  --  129*     Hepatic: No results for input(s): AST, ALT, ALB, BILITOT, ALKPHOS in the last 72 hours. Troponin: No results for input(s): TROPONINI in the last 72 hours. BNP: No results for input(s): BNP in the last 72 hours.   Lipids:   Recent Labs     04/25/20  1440   CHOL 111   HDL 46     INR:   Recent Labs     04/25/20  0757   INR 1.1       Assessment and Recommendations:   78year old with left MCA stroke mostly insular stroke and left M2 occlusion s/p MT resulted in TICI 2c 4/25/2020. Etiology cardiomebolic due to Afib    1. ASA 81 mg daily  2. lipitor 40 mg daily  3. goal BP  given distal M6 occlusion. 4. Eventually may need Ac.     Colt Mccloud MD  Stroke, [facility]  [address] [place]  Electronically signed 4/27/2020 at 3:49 AM

## 2020-04-27 NOTE — PROGRESS NOTES
Patients BP dropped below goal, and was symptomatic. Patient was unable to move RUE, which was a change from previous assessment. Writer notified on call resident Dr. Wes Grady to continue to monitor. Writer did as such. Upon reassessment, Patient has returned to baseline and was able to move RUE. Will continue to monitor.

## 2020-04-27 NOTE — PROGRESS NOTES
Occupational Therapy   Occupational Therapy Initial Assessment  Date: 2020   Patient Name: Michael Steen  MRN: [id number]     : 1950     Copied from H&P:  The patient is a 78 y.o. male presented with wake up aphasia, dysarthria, right sided weakness, and left sided gaze. Pt woke up with these symptoms between 3634-9899. Pt reported to go to sleep, per family, approximately 0200. Pt had elevated SBP >200 for EMS and was given Labetalol en route to ER. CT head completed in mobile stroke unit which was unremarkable, no hemorrhage. Not a tPA candidate because he was outside the window. CTA h/n showing occlusion of left proximal M2 at the level of the sylvian fissure. He was taken for emergent thrombectomy. TICI 2.     Patient's son states that pt has a known history of atrial fibrillation but has not been on anticoagulation or any medication for \"a very long time\". Date of Service: 2020    Discharge Recommendations:  Patient would benefit from continued therapy after discharge  OT Equipment Recommendations  Other: CTA pending progress     Assessment   Performance deficits / Impairments: Decreased functional mobility ; Decreased ADL status; Decreased endurance;Decreased balance  Assessment: Pt would benefit from continued acute care and post acute care OT to address moderate/ maximal deficits in ADL/ functional activities, endurance and functional transfers/ mobility following admission. Pt required 2 person assistance for functional transfers and was unable to complete functional mobility.  Pt required max A for LE ADL activities   Prognosis: Fair;Good  Decision Making: Medium Complexity  OT Education: OT Role;Plan of Care;Transfer Training  Patient Education: purpose of evaluation   Barriers to Learning: expressive aphasia   REQUIRES OT FOLLOW UP: Yes  Activity Tolerance  Activity Tolerance: Patient limited by fatigue;Patient Tolerated treatment well  Safety Devices  Safety Devices in place: Yes  Type of (degrees)  Right Hand General AROM: no active movement, however, noted to be able to maintain  once established   LUE Strength  Gross LUE Strength: WFL  RUE Strength  Gross RUE Strength: Exceptions to [facility]  RUE Strength Comment: AAROM for shoulder ROM, unable to move elbow or hand against gravity      Plan   Plan  Times per week: 4-5x/wk   Current Treatment Recommendations: Functional Mobility Training, Endurance Training, Safety Education & Training, Equipment Evaluation, Education, & procurement, Patient/Caregiver Education & Training, Self-Care / ADL    AM-PAC Score  AM-PAC Inpatient Daily Activity Raw Score: 14 (04/27/20 1110)  AM-PAC Inpatient ADL T-Scale Score : 33.39 (04/27/20 1110)  ADL Inpatient CMS 0-100% Score: 59.67 (04/27/20 1110)  ADL Inpatient CMS G-Code Modifier : CK (04/27/20 1110)    Goals  Short term goals  Time Frame for Short term goals: by discharge, pt will  Short term goal 1: demo min A for UE ADL activities with set up   Short term goal 2: demo mod A for LE ADL activities with set up   Short term goal 3: demo min A for functional  transfers/ mobility with use of LRD and good safety awareness during fuctional activities   Short term goal 4: demo understanding and I use of ECWS, fall prevention tech during functional activities   Short term goal 5: demo increased activity tolerance of 25+ min to assist with ADL/ functional activities   Short term goal 6: tolerate weightbearing/ PNF patterning to RUE to assist with ADL/ functional activities        Therapy Time   Individual Concurrent Group Co-treatment   Time In 0900         Time Out 0925         Minutes 25         Timed Code Treatment Minutes: 8 Minutes   See above for LOF. RN reports patient is medically stable for therapy treatment this date. Chart reviewed prior to treatment and patient is agreeable for therapy. All lines intact and patient positioned comfortably at end of treatment.   All patient needs addressed prior to ending therapy session.       Luz Marin, OTR/L

## 2020-04-27 NOTE — PROGRESS NOTES
-- 68 24 92 %   04/27/20 0545 135/65 -- -- 67 22 92 %   04/27/20 0530 (!) 142/73 -- -- 73 21 91 %   04/27/20 0515 (!) 159/68 -- -- 71 20 94 %   04/27/20 0505 135/68 -- -- 71 22 94 %   04/27/20 0445 (!) 141/67 -- -- 72 21 93 %   04/27/20 0430 (!) 172/85 -- -- 83 19 95 %   04/27/20 0420 124/64 -- -- 86 17 94 %   04/27/20 0400 (!) 148/55 98.6 °F (37 °C) Oral 66 20 92 %   04/27/20 0345 (!) 122/58 -- -- 72 22 91 %   04/27/20 0330 (!) 140/65 -- -- 67 21 90 %   04/27/20 0300 (!) 152/81 -- -- 77 20 92 %   04/27/20 0245 (!) 170/81 -- -- 81 19 93 %   04/27/20 0230 (!) 144/64 -- -- 73 21 93 %   04/27/20 0220 (!) 157/69 -- -- 78 18 93 %   04/27/20 0200 (!) 169/94 -- -- 88 17 94 %   04/27/20 0145 (!) 148/69 -- -- 84 18 92 %   04/27/20 0130 (!) 156/75 -- -- 83 22 91 %   04/27/20 0115 (!) 169/80 -- -- 85 20 95 %     Estimated body mass index is 45.92 kg/m² as calculated from the following:    Height as of this encounter: 5' 10\" (1.778 m). Weight as of this encounter: 320 lb (145.2 kg).  []<16 Severe malnutrition  []16-16.99 Moderate malnutrition  []17-18.49 Mild malnutrition  []18.5-24.9 Normal  []25-29.9 Overweight (not obese)  []30-34.9 Obese class 1 (Low Risk)  []35-39.9 Obese class 2 (Moderate Risk)  [x]? 40 Obese class 3 (High Risk)    RECENT LABS:   Lab Results   Component Value Date    WBC 12.3 (H) 04/27/2020    HGB 14.5 04/27/2020    HCT 43.7 04/27/2020     04/27/2020    CHOL 111 04/25/2020    TRIG 80 04/25/2020    HDL 46 04/25/2020     (L) 04/27/2020    K 3.9 04/27/2020    CL 98 04/27/2020    CREATININE 0.79 04/27/2020    BUN 11 04/27/2020    CO2 21 04/27/2020    TSH 2.38 04/26/2020    INR 1.1 04/25/2020    LABA1C 5.6 04/25/2020     24 HOUR INTAKE/OUTPUT:    Intake/Output Summary (Last 24 hours) at 4/27/2020 1306  Last data filed at 4/27/2020 5366  Gross per 24 hour   Intake 1131 ml   Output 2302 ml   Net -1171 ml       IMAGING:   Xr Abdomen (kub) (single Ap View)    Result Date: 4/25/2020  EXAMINATION: ONE Luci Lópezn on 4/25/2020 at 7:27 am.     Ir Angiogram Carotid C Erebral Bilateral    Result Date: 4/25/2020  Date of Service: 4/25/2020 Diagnosis: Acute Ischemic Stroke Procedure: Mechanical Thrombectomy for Acute Left MCA Clot Patient arrived to the angio suite at: 18:15 Puncture obtained at: 18:44 Vascular access was removed at: 19:29 [place]: Magdaleno Giordano MD. Contrast: 110 Cc Of Visipaque-270 Fluoroscopy time: 30.4 minutes Procedures: 1. Trans-arterial endovascular mechanical thrombectomy using Solitaire Device with Combined Suctioning for acute Left MCA clot 2. Left Internal Carotid Artery Cerebral Angiography (pre, during and after thrombectomy) 3. Left Common Carotid Artery Angiogram 4. Right Common Femoral Artery Angiogram Neurointerventionalist: Desean Velazquez MD. Access: Right Common Femoral Artery Anesthesia: MAC Indication and Clinical Hx: 78years old male with history of A. fib not on anticoagulation who presents with right-sided weakness and left gaze and aphasia, patient was not a candidate for TPA because results out of window. CT head with no acute finding. CTA head and neck with left M2 occlusion. Patient was taken to mechanical thrombectomy. Consent: after explaining the risks and benefit to the family, including but not limited to vessel injury or perforation, stroke, intracranial hemorrhage, radiation, dye allergic reaction, groin hematoma and death, an informed consent was obtained, signed and witnessed. Technique and Interpretations: The patient was brought to the interventional suite and placed in a supine position by the stroke and anesthesia team. The patient prepped and draped under sterile technique and the right CFA was accessed under FL guidance using an 8F introducer sheath.  Left CCA Technique: Subsequently, the Left common carotid artery (CCA) was selectively catheterized under fluoroscopic guidance and over the guide wire using the 8F FlowGate balloon guide catheter and Berenstein select was obtained. The solitaire device and the large bore reperfusion catheter were removed slowly from the body as one unit into the balloon guide catheter with negative pressure from the balloon side port and the large bore aspiration catheter attached to continuous aspiration. The guide catheter was double flushed and the balloon was deflated. Post first pass run demonstrated complete recanalization (TICI 2c) with distal M2 occlusion. Pass three: The Solitaire 4 x 40 mm device was advanced under fluoroscopic guidance and was unsheathed spanning the L MCA clot. The Device was kept open for 5 minutes. Then, the balloon guide catheter was inflated in the ICA and flow arrest was obtained. The solitaire device was removed slowly from the body with negative pressure from the balloon side port with 60 cc syringe. The guide catheter was double flushed and the balloon was deflated. Post first pass run demonstrated complete recanalization (TICI 2c) with distal M6 occlusion that is not amenable to mechanical thrombectomy. The right common femoral artery was then evaluated via femoral angiogram via the introducer sheath for possible use of closure device. The final images were reviewed, and the introducer sheath was removed. Hemostasis was maintained using 8F Angioseal closing device followed by manual pressure for 10 minutes. Impression: 1. Left MCA M1 proximal clot resulting in acute ischemic stroke due to cardioembolic event treated with 3 passes of mechanical thrombectomy using Solitaire 4 x 40 mm and a large bore reperfusion catheter resulted in tici 2c with persistent distal end at C6 occlusion that is not amenable to mechanical thrombectomy. Dr. July Chen dictated this invasive procedure. Dr. Luci Galvan was present for all procedural and imaging components of this case. Examination was reviewed and reported findings confirmed and evaluated by Dr. Luci Galvan.      Xr Chest Portable    Result Date: 4/26/2020  EXAMINATION: ONE XRAY VIEW pneumothorax or pleural effusion. There is no radiopaque foreign body. Degenerative changes involve the bilateral shoulders. 1. Cardiomegaly. Xr Abdomen For Ng/og/ne Tube Placement    Result Date: 4/25/2020  EXAMINATION: ONE SUPINE XRAY VIEW(S) OF THE ABDOMEN; ONE XRAY VIEW OF THE CHEST 4/25/2020 2:44 pm COMPARISON: 04/25/2020 HISTORY: ORDERING SYSTEM PROVIDED HISTORY: Confirmation of course of NG/OG/NE tube and location of tip of tube TECHNOLOGIST PROVIDED HISTORY: Confirmation of course of NG/OG/NE tube and location of tip of tube Portable? ->Yes Reason for Exam: NG placement/  AP supine Acuity: Unknown Type of Exam: Unknown FINDINGS: Chest: Shallow inspiration. Cardiomegaly. Enteric tube passes beneath the diaphragm. Pulmonary vascular congestion. No effusion. No pneumothorax. No acute osseous abnormality. Abdomen: Enteric tube courses into the expected location of the stomach. The side port is just beyond the GE junction. About 5 cm for better positioning. The visualized bowel is normal.  Probable splenomegaly. Enteric tube terminates in the expected location of the stomach with the side port is near the GE junction. Consider advancing about 5 cm for better position. Cardiomegaly and pulmonary vascular congestion may be magnified in part by shallow inspiration but an element of volume overload is considered. Probable splenomegaly. Cta Head Neck W Contrast    Result Date: 4/25/2020  EXAMINATION: CTA OF THE HEAD AND NECK WITH CONTRAST 4/25/2020 7:53 am: TECHNIQUE: CTA of the head and neck was performed with the administration of intravenous contrast. Multiplanar reformatted images are provided for review. MIP images are provided for review. Stenosis of the internal carotid arteries measured using NASCET criteria. Dose modulation, iterative reconstruction, and/or weight based adjustment of the mA/kV was utilized to reduce the radiation dose to as low as reasonably achievable.  COMPARISON: There is question of blurring of the gray-white differentiation involving the left insular cortex as well as the left frontal lobe near the vertex. 3. No focal stenosis otherwise seen of the cniypd-mg-[id number]. 4. No flow limiting stenosis of the cervical carotid/vertebral arteries. Findings were communicated to Dr. Liz Menendez on 4/25/2020 at 8:36 am.     Mri Brain Wo Contrast    Result Date: 4/25/2020  EXAMINATION: MRI OF THE BRAIN WITHOUT CONTRAST  4/25/2020 3:33 pm TECHNIQUE: Multiplanar multisequence MRI of the brain was performed without the administration of intravenous contrast. COMPARISON: None. HISTORY: ORDERING SYSTEM PROVIDED HISTORY: possible wake up stroke, evaluate for left sided stroke TECHNOLOGIST PROVIDED HISTORY: possible wake up stroke, evaluate for left sided stroke Initial evaluation FINDINGS: INTRACRANIAL STRUCTURES/VENTRICLES: There is an area of acute infarct involving the left insular cortex and left frontotemporal region. There is a subtle area of acute infarct in the left parietooccipital lobe. No other areas of restricted diffusion are identified. Motion artifact degrades the images. The ventricles and cisternal spaces are prominent consistent with cerebral atrophy. There are multiple and confluent areas of high signal in the periventricular white matter and centrum semiovale that are likely related to chronic small vessel ischemic disease. There is no midline shift or mass effect. ORBITS: The visualized portion of the orbits demonstrate no acute abnormality. SINUSES: There is a mucous retention cyst or polyp in the left maxillary sinus. The remainder of the sinuses are clear. The mastoid air cells are clear. BONES/SOFT TISSUES: The bone marrow signal intensity appears normal. The soft tissues demonstrate no acute abnormality.      Areas of acute infarct in the left insular cortex and left frontotemporal region and a smaller area of acute infarct in the left parieto-occipital region Out of window for tPA. Taken for L M2 mechanical thrombectomy, TICI 2C. NEUROLOGIC:  - Left MCA territory infarction in setting of left M2 MCA occlusion  - Etiology likely cardioembolic in setting of afib  - POD #2 s/p mechanical thrombectomy left M2, TICI 2C  - Asprin 81mg QD, Lipitor 40mg QHS x2weeks then lower dose  - Continue Low dose heparin infusion  - CT Head 4/27 therapeutic on Heparin - progression of the L MCA infarct with scattered subarachnoid hyperdense material representing mild hemorrhage versus hemorrhage versus contrast staining  - Suspected contrast rather than SAH on CT obtained 4/27. Obtain repeat CT Head 0500 4/28  - Goal -170, slowly normalize BP  - Neuro checks per protocol    CARDIOVASCULAR:  - Goal -170, slowly normalize BP  - Clevidipine infusion, wean as able  - PRN Hydralazine/Labetalol  - add Lisinopril 10mg QD  - Severe, symptomatic bradycardia post op resolved with Atropine  - EKG afib right BBB, left anterior fascicular block  - Repeat EKG today afib, right BBB no longer present  - Chronic atrial fibrillation, rate controlled  - Lopressor 12.5mg BID, hold HR<60  - On Heparin infusion, will need oral AC for secondary stroke prevention.  Ok at 7d  - F/U Echo  - Lipid panel; LDL 49, Cholesterol 111  - Lipitor 40mg QHS x2 weeks then decrease dose  - Continue telemetry    PULMONARY:  - Maintaining O2 sats on 0-2L nasal canula  - Mild vascular congestion on CXR  - Given Lasix 10mg IVP x1 yesterday, 20mg x 1 today for stable pulmonary vascular congestion on CXR, elevated pro-BNP, desat into 80s  - Improved SpO2 on NRB, BiPAP ordered    RENAL/FLUID/ELECTROLYTE:  - Normal renal functioning   - BUN 11/ Creatinine 0.79  - OK to remove johnson post diuresis  - Stop maintenance IVF  - Replace electrolytes PRN  - Daily BMP    GI/NUTRITION:  NUTRITION:  DIET TUBE FEED CONTINUOUS/CYCLIC NPO; STANDARD WITHOUT FIBER; Nasogastric; 25; 75   - Failed Barium swallow study, TF via NG  - Bowel

## 2020-04-27 NOTE — PROCEDURES
INSTRUMENTAL SWALLOW REPORT  MODIFIED BARIUM SWALLOW    NAME: Yana Delgado   : 1950  MRN: [id number]       Date of Eval: 2020              Referring Diagnosis(es):      Past Medical History:  has a past medical history of Atrial fibrillation ([facility] [address] and Stroke ([facility] [address] Past Surgical History:  has no past surgical history on file. Current Diet Solid Consistency: NPO  Current Diet Liquid Consistency: NPO       Type of Study: Initial MBS         Patient Complaints/Reason for Referral:  Yana Delgado was referred for a MBS to assess the efficiency of his/her swallow function, assess for aspiration, and to make recommendations regarding safe dietary consistencies, effective compensatory strategies, and safe eating environment. Onset of problem: The patient is a 78 y.o. male presented with wake up aphasia, dysarthria, right sided weakness, and left sided gaze. Pt woke up with these symptoms between 1761-6216. Pt reported to go to sleep, per family, approximately 0200. Pt had elevated SBP >200 for EMS and was given Labetalol en route to ER. CT head completed in mobile stroke unit which was unremarkable, no hemorrhage. Not a tPA candidate because he was outside the window. CTA h/n showing occlusion of left proximal M2 at the level of the sylvian fissure. He was taken for emergent thrombectomy. TICI 2.     Patient's son states that pt has a known history of atrial fibrillation but has not been on anticoagulation or any medication for \"a very long time\".    When patient arrived to ICU, he quickly experienced bradycardia down to as low as 28 bpm. Pt actively vomiting and was suctioned aggressively. Pt received 0.5mg Atropine with resolution of the bradycardia, HR then into 60s. Hypertensive on Cleviprex. Empirically given 2mg Mg. Monitor showing AF. Behavior/Cognition/Vision/Hearing:  Behavior/Cognition: Alert; Cooperative  Vision: Impaired  Vision Exceptions: Wears glasses at all times  Hearing: thick:  Mildly delay swallow, premature spillage to vallecule      Pharyngeal Phase  Pharyngeal Phase: Impaired  Puree: +immediate penetration, +immediate gross aspiration, + delay cough, moderate residual at vallecula and pyriform   Honey thick: +immediate penetration that remained above the vocal fold and did not clear, +delay cough, mild residual at vallecula and pyriform       Pain   Patient Currently in Pain: No  Pain Level: 0      Therapy Time:   Individual Concurrent Group Co-treatment   Time In 1030         Time Out 1040         Minutes [address] [place] [address], 4/27/2020, 11:03 AM

## 2020-04-27 NOTE — PROGRESS NOTES
Speech Language Pathology  [address]/Department[address]  Initial Speech/Language/Cognitive Assessment    NAME: Madyson Quezada  : 1950   MRN: [id number]  ADMISSION DATE: 2020  ADMITTING DIAGNOSIS: has Cerebrovascular accident (CVA) ([facility] [address] Acute ischemic left MCA stroke ([facility] [address] Bradycardia; and Arterial line in place on their problem list.      Date of Eval: 2020   Evaluating Therapist: LIZY Sebastian    RECENT RESULTS  CT OF HEAD/MRI:   Impression   Progression of the left middle cerebral artery infarct with scattered   subarachnoid hyperdense material representing mild hemorrhage versus contrast   staining and attention on short-term follow-up is recommended. Primary Complaint: The patient is a 78 y.o. male presented with wake up aphasia, dysarthria, right sided weakness, and left sided gaze. Pt woke up with these symptoms between 1805-5362. Pt reported to go to sleep, per family, approximately 0200. Pt had elevated SBP >200 for EMS and was given Labetalol en route to ER. CT head completed in mobile stroke unit which was unremarkable, no hemorrhage. Not a tPA candidate because he was outside the window. CTA h/n showing occlusion of left proximal M2 at the level of the sylvian fissure. He was taken for emergent thrombectomy. TICI 2.     Patient's son states that pt has a known history of atrial fibrillation but has not been on anticoagulation or any medication for \"a very long time\".    When patient arrived to ICU, he quickly experienced bradycardia down to as low as 28 bpm. Pt actively vomiting and was suctioned aggressively. Pt received 0.5mg Atropine with resolution of the bradycardia, HR then into 60s. Hypertensive on Cleviprex. Empirically given 2mg Mg. Monitor showing AF. Pain:  Pain Assessment  Pain Assessment: Faces  Pain Level: 0  Ellington-Baker Pain Rating: No hurt    Assessment:      Pt presents with severe expressive and moderate recpetive aphasia.     ST to follow up and provide treatment to address noted deficits. Education provided. Recommendations:  Requires SLP Intervention: Yes  Duration/Frequency of Treatment: 3-5x week  D/C Recommendations: To be determined       Plan:   Goals:  Short-term Goals  Goal 1: Establish a reliable yes/no response with 90%  Goal 2: Follow 2 step commands with 90%  Goal 3: Verbalize bio info with 90%  Goal 4: Verbalize automatics with 90% accuracy  Goal 5: Further assess as able   Patient/family involved in developing goals and treatment plan: yes    Subjective:   Previous level of function and limitations: independent  General  Chart Reviewed: Yes  Social/Functional History  Lives With: Family  Type of Home: [employer]  Vision  Vision: Impaired  Hearing  Hearing: Within functional limits           Objective:     Oral/Motor  Oral Motor: Within functional limits    Auditory Comprehension  Comprehension: Exceptions  Yes/No Questions: Moderate(4/8)  Basic Questions:  Moderate  Two Step Basic Commands: Severe(1/5)  Conversation: Severe         Expression  Primary Mode of Expression: Verbal(pt. verbalized /ah/ and /mmmm/ no other verbalizations noted with max cues)    Verbal Expression  Verbal Expression: Exceptions to functional limits  Initiation: Severe  Repetition: Severe  Automatic Speech: Severe  Conversation: Severe         Motor Speech  Motor Speech: Unable to assess(only verbalized \"ah\" and \"mmmm\", otherwise no verbalizations noted.)      Prognosis:  Speech Therapy Prognosis  Prognosis: Fair  Individuals consulted  Consulted and agree with results and recommendations: Patient;RN    Education:  Patient Education: yes  Patient Education Response: Verbalizes understanding  Safety Devices in place: Yes       Therapy Time:   Individual Concurrent Group Co-treatment   Time In 0925         Time Out 0935         Minutes 10                 Ping-Viru 25, SLP  4/27/2020 11:49 AM

## 2020-04-27 NOTE — PROGRESS NOTES
ENDOVASCULAR NEUROSURGERY PROGRESS NOTE  4/27/2020 2:28 PM  Subjective:   Admit Date: 4/25/2020  PCP: No primary care provider on file. No new acute events. Objective:   Vitals: BP (!) 162/75   Pulse 91   Temp 98.5 °F (36.9 °C)   Resp 22   Ht 5' 10\" (1.778 m)   Wt (!) 320 lb (145.2 kg)   SpO2 92%   BMI 45.92 kg/m²     General appearance: Lying in bed, NAD,  Lungs: CTAB  Heart: RRR  Abdomen: soft, NTND, bowel sounds normal    Neuro exam: Follows some simple commands, left gaze preference. CN II-XII: pupils 2 mm reactive to light bilaterally, VFF. Right upper extremity flaccid. Withdrawing in right lower extremity. Moving left upper and left lower extremities against gravity. Medications and labs:   Scheduled Meds:   lisinopril  10 mg Oral Daily    atorvastatin  40 mg Oral Nightly    metoprolol tartrate  12.5 mg Oral BID    sennosides-docusate sodium  2 tablet Oral Daily    chlorhexidine  15 mL Mouth/Throat BID    sodium chloride flush  10 mL Intravenous 2 times per day    aspirin  81 mg Oral Daily    Or    aspirin  300 mg Rectal Daily     Continuous Infusions:   clevidipine 6 mg/hr (04/27/20 1258)    dextrose      heparin (porcine) 18.9 Units/kg/hr (04/27/20 1259)     CBC:   Recent Labs     04/25/20  0757 04/26/20  0609 04/27/20  0635   WBC 8.1 10.3 12.3*   HGB 14.3 13.1 14.5    185 221     BMP:    Recent Labs     04/25/20  0757 04/25/20  1235 04/26/20  0609 04/27/20  0635     --  135 134*   K 3.8  --  3.9 3.9     --  101 98   CO2 21  --  23 21   BUN 14  --  10 11   CREATININE 1.06 1.05 1.01 0.79   GLUCOSE 160*  --  129* 128*     Hepatic: No results for input(s): AST, ALT, ALB, BILITOT, ALKPHOS in the last 72 hours. Troponin: No results for input(s): TROPONINI in the last 72 hours. BNP: No results for input(s): BNP in the last 72 hours.   Lipids:   Recent Labs     04/25/20  1440   CHOL 111   HDL 46     INR:   Recent Labs     04/25/20  0757   INR 1.1 Assessment and Recommendations:   78year old with left MCA stroke mostly insular stroke and left M2 occlusion s/p MT resulted in TICI 2c 4/25/2020. Etiology cardiomebolic due to Afib    1. ASA 81 mg daily  2. lipitor 40 mg daily  3. On heparin drip. 4. Normalize blood pressure slowly.       Patric Ramírez MD  Stroke, [facility]  [address] [place]  Electronically signed 4/27/2020 at 2:28 PM

## 2020-04-28 ENCOUNTER — APPOINTMENT (OUTPATIENT)
Dept: CT IMAGING | Age: 70
DRG: 023 | End: 2020-04-28
Payer: MEDICARE

## 2020-04-28 ENCOUNTER — APPOINTMENT (OUTPATIENT)
Dept: GENERAL RADIOLOGY | Age: 70
DRG: 023 | End: 2020-04-28
Payer: MEDICARE

## 2020-04-28 LAB
-: ABNORMAL
-: NORMAL
ABSOLUTE EOS #: 0 K/UL (ref 0–0.4)
ABSOLUTE IMMATURE GRANULOCYTE: 0 K/UL (ref 0–0.3)
ABSOLUTE LYMPH #: 1.97 K/UL (ref 1–4.8)
ABSOLUTE MONO #: 2.46 K/UL (ref 0.1–0.8)
AMORPHOUS: ABNORMAL
ANION GAP SERPL CALCULATED.3IONS-SCNC: 15 MMOL/L (ref 9–17)
ANION GAP SERPL CALCULATED.3IONS-SCNC: 16 MMOL/L (ref 9–17)
BACTERIA: ABNORMAL
BASOPHILS # BLD: 0 % (ref 0–2)
BASOPHILS ABSOLUTE: 0 K/UL (ref 0–0.2)
BILIRUBIN URINE: ABNORMAL
BUN BLDV-MCNC: 28 MG/DL (ref 8–23)
BUN BLDV-MCNC: 31 MG/DL (ref 8–23)
BUN/CREAT BLD: ABNORMAL (ref 9–20)
BUN/CREAT BLD: ABNORMAL (ref 9–20)
CALCIUM SERPL-MCNC: 8.4 MG/DL (ref 8.6–10.4)
CALCIUM SERPL-MCNC: 8.7 MG/DL (ref 8.6–10.4)
CASTS UA: ABNORMAL /LPF (ref 0–2)
CASTS UA: ABNORMAL /LPF (ref 0–2)
CHLORIDE BLD-SCNC: 100 MMOL/L (ref 98–107)
CHLORIDE BLD-SCNC: 101 MMOL/L (ref 98–107)
CO2: 18 MMOL/L (ref 20–31)
CO2: 19 MMOL/L (ref 20–31)
COLOR: ABNORMAL
CREAT SERPL-MCNC: 1.38 MG/DL (ref 0.7–1.2)
CREAT SERPL-MCNC: 1.6 MG/DL (ref 0.7–1.2)
CRYSTALS, UA: ABNORMAL /HPF
DIFFERENTIAL TYPE: ABNORMAL
EKG ATRIAL RATE: 300 BPM
EKG Q-T INTERVAL: 384 MS
EKG QRS DURATION: 90 MS
EKG QTC CALCULATION (BAZETT): 386 MS
EKG R AXIS: -35 DEGREES
EKG T AXIS: 163 DEGREES
EKG VENTRICULAR RATE: 61 BPM
EOSINOPHILS RELATIVE PERCENT: 0 % (ref 1–4)
EPITHELIAL CELLS UA: ABNORMAL /HPF (ref 0–5)
GFR AFRICAN AMERICAN: 52 ML/MIN
GFR AFRICAN AMERICAN: >60 ML/MIN
GFR NON-AFRICAN AMERICAN: 43 ML/MIN
GFR NON-AFRICAN AMERICAN: 51 ML/MIN
GFR SERPL CREATININE-BSD FRML MDRD: ABNORMAL ML/MIN/{1.73_M2}
GLUCOSE BLD-MCNC: 144 MG/DL (ref 70–99)
GLUCOSE BLD-MCNC: 186 MG/DL (ref 70–99)
GLUCOSE URINE: NEGATIVE
HCT VFR BLD CALC: 43.5 % (ref 40.7–50.3)
HEMOGLOBIN: 15 G/DL (ref 13–17)
IMMATURE GRANULOCYTES: 0 %
KETONES, URINE: NEGATIVE
LEUKOCYTE ESTERASE, URINE: ABNORMAL
LV EF: 63 %
LVEF MODALITY: NORMAL
LYMPHOCYTES # BLD: 8 % (ref 24–44)
MAGNESIUM: 2.1 MG/DL (ref 1.6–2.6)
MCH RBC QN AUTO: 30.2 PG (ref 25.2–33.5)
MCHC RBC AUTO-ENTMCNC: 34.5 G/DL (ref 28.4–34.8)
MCV RBC AUTO: 87.7 FL (ref 82.6–102.9)
MONOCYTES # BLD: 10 % (ref 1–7)
MORPHOLOGY: ABNORMAL
MUCUS: ABNORMAL
NITRITE, URINE: POSITIVE
NRBC AUTOMATED: 0 PER 100 WBC
OTHER OBSERVATIONS UA: ABNORMAL
PARTIAL THROMBOPLASTIN TIME: 39.8 SEC (ref 20.5–30.5)
PARTIAL THROMBOPLASTIN TIME: 65.2 SEC (ref 20.5–30.5)
PARTIAL THROMBOPLASTIN TIME: 75.1 SEC (ref 20.5–30.5)
PARTIAL THROMBOPLASTIN TIME: 95.4 SEC (ref 20.5–30.5)
PDW BLD-RTO: 14.9 % (ref 11.8–14.4)
PH UA: 5 (ref 5–8)
PLATELET # BLD: 297 K/UL (ref 138–453)
PLATELET ESTIMATE: ABNORMAL
PMV BLD AUTO: 11.5 FL (ref 8.1–13.5)
POTASSIUM SERPL-SCNC: 3.7 MMOL/L (ref 3.7–5.3)
POTASSIUM SERPL-SCNC: 3.9 MMOL/L (ref 3.7–5.3)
PROTEIN UA: ABNORMAL
RBC # BLD: 4.96 M/UL (ref 4.21–5.77)
RBC # BLD: ABNORMAL 10*6/UL
RBC UA: ABNORMAL /HPF (ref 0–2)
REASON FOR REJECTION: NORMAL
RENAL EPITHELIAL, UA: ABNORMAL /HPF
SEG NEUTROPHILS: 82 % (ref 36–66)
SEGMENTED NEUTROPHILS ABSOLUTE COUNT: 20.17 K/UL (ref 1.8–7.7)
SODIUM BLD-SCNC: 134 MMOL/L (ref 135–144)
SODIUM BLD-SCNC: 135 MMOL/L (ref 135–144)
SPECIFIC GRAVITY UA: 1.02 (ref 1–1.03)
TRICHOMONAS: ABNORMAL
TURBIDITY: ABNORMAL
URINE HGB: NEGATIVE
UROBILINOGEN, URINE: NORMAL
WBC # BLD: 24.6 K/UL (ref 3.5–11.3)
WBC # BLD: ABNORMAL 10*3/UL
WBC UA: ABNORMAL /HPF (ref 0–5)
YEAST: ABNORMAL
ZZ NTE CLEAN UP: ORDERED TEST: NORMAL
ZZ NTE WITH NAME CLEAN UP: SPECIMEN SOURCE: NORMAL

## 2020-04-28 PROCEDURE — 6370000000 HC RX 637 (ALT 250 FOR IP): Performed by: STUDENT IN AN ORGANIZED HEALTH CARE EDUCATION/TRAINING PROGRAM

## 2020-04-28 PROCEDURE — 80048 BASIC METABOLIC PNL TOTAL CA: CPT

## 2020-04-28 PROCEDURE — 51701 INSERT BLADDER CATHETER: CPT

## 2020-04-28 PROCEDURE — 2580000003 HC RX 258: Performed by: STUDENT IN AN ORGANIZED HEALTH CARE EDUCATION/TRAINING PROGRAM

## 2020-04-28 PROCEDURE — 87641 MR-STAPH DNA AMP PROBE: CPT

## 2020-04-28 PROCEDURE — 71045 X-RAY EXAM CHEST 1 VIEW: CPT

## 2020-04-28 PROCEDURE — 97535 SELF CARE MNGMENT TRAINING: CPT

## 2020-04-28 PROCEDURE — 93005 ELECTROCARDIOGRAM TRACING: CPT | Performed by: PSYCHIATRY & NEUROLOGY

## 2020-04-28 PROCEDURE — 83735 ASSAY OF MAGNESIUM: CPT

## 2020-04-28 PROCEDURE — 94660 CPAP INITIATION&MGMT: CPT

## 2020-04-28 PROCEDURE — 6370000000 HC RX 637 (ALT 250 FOR IP): Performed by: NURSE PRACTITIONER

## 2020-04-28 PROCEDURE — 2000000003 HC NEURO ICU R&B

## 2020-04-28 PROCEDURE — 99233 SBSQ HOSP IP/OBS HIGH 50: CPT | Performed by: PSYCHIATRY & NEUROLOGY

## 2020-04-28 PROCEDURE — 6360000002 HC RX W HCPCS: Performed by: STUDENT IN AN ORGANIZED HEALTH CARE EDUCATION/TRAINING PROGRAM

## 2020-04-28 PROCEDURE — 97110 THERAPEUTIC EXERCISES: CPT

## 2020-04-28 PROCEDURE — 81001 URINALYSIS AUTO W/SCOPE: CPT

## 2020-04-28 PROCEDURE — 2700000000 HC OXYGEN THERAPY PER DAY

## 2020-04-28 PROCEDURE — 99211 OFF/OP EST MAY X REQ PHY/QHP: CPT

## 2020-04-28 PROCEDURE — 97530 THERAPEUTIC ACTIVITIES: CPT

## 2020-04-28 PROCEDURE — 87040 BLOOD CULTURE FOR BACTERIA: CPT

## 2020-04-28 PROCEDURE — 6370000000 HC RX 637 (ALT 250 FOR IP): Performed by: PSYCHIATRY & NEUROLOGY

## 2020-04-28 PROCEDURE — 93306 TTE W/DOPPLER COMPLETE: CPT

## 2020-04-28 PROCEDURE — 99222 1ST HOSP IP/OBS MODERATE 55: CPT | Performed by: INTERNAL MEDICINE

## 2020-04-28 PROCEDURE — 85730 THROMBOPLASTIN TIME PARTIAL: CPT

## 2020-04-28 PROCEDURE — 85025 COMPLETE CBC W/AUTO DIFF WBC: CPT

## 2020-04-28 PROCEDURE — 2500000003 HC RX 250 WO HCPCS: Performed by: STUDENT IN AN ORGANIZED HEALTH CARE EDUCATION/TRAINING PROGRAM

## 2020-04-28 PROCEDURE — 36415 COLL VENOUS BLD VENIPUNCTURE: CPT

## 2020-04-28 PROCEDURE — 70450 CT HEAD/BRAIN W/O DYE: CPT

## 2020-04-28 PROCEDURE — 94761 N-INVAS EAR/PLS OXIMETRY MLT: CPT

## 2020-04-28 RX ORDER — 0.9 % SODIUM CHLORIDE 0.9 %
250 INTRAVENOUS SOLUTION INTRAVENOUS ONCE
Status: COMPLETED | OUTPATIENT
Start: 2020-04-28 | End: 2020-04-28

## 2020-04-28 RX ORDER — ALBUTEROL SULFATE 2.5 MG/3ML
2.5 SOLUTION RESPIRATORY (INHALATION)
Status: DISCONTINUED | OUTPATIENT
Start: 2020-04-28 | End: 2020-05-03 | Stop reason: HOSPADM

## 2020-04-28 RX ADMIN — HEPARIN SODIUM 16.9 UNITS/KG/HR: 10000 INJECTION, SOLUTION INTRAVENOUS at 19:02

## 2020-04-28 RX ADMIN — PIPERACILLIN AND TAZOBACTAM 3.38 G: 3; .375 INJECTION, POWDER, FOR SOLUTION INTRAVENOUS at 20:23

## 2020-04-28 RX ADMIN — HEPARIN SODIUM 18.9 UNITS/HR: 10000 INJECTION, SOLUTION INTRAVENOUS at 05:33

## 2020-04-28 RX ADMIN — ASPIRIN 81 MG: 81 TABLET, COATED ORAL at 08:26

## 2020-04-28 RX ADMIN — SODIUM CHLORIDE, PRESERVATIVE FREE 10 ML: 5 INJECTION INTRAVENOUS at 09:50

## 2020-04-28 RX ADMIN — STANDARDIZED SENNA CONCENTRATE AND DOCUSATE SODIUM 2 TABLET: 8.6; 5 TABLET ORAL at 08:26

## 2020-04-28 RX ADMIN — METOPROLOL TARTRATE 12.5 MG: 25 TABLET ORAL at 20:29

## 2020-04-28 RX ADMIN — PIPERACILLIN AND TAZOBACTAM 3.38 G: 3; .375 INJECTION, POWDER, FOR SOLUTION INTRAVENOUS at 15:20

## 2020-04-28 RX ADMIN — METOPROLOL TARTRATE 12.5 MG: 25 TABLET ORAL at 08:26

## 2020-04-28 RX ADMIN — Medication 15 ML: at 09:50

## 2020-04-28 RX ADMIN — VANCOMYCIN HYDROCHLORIDE 2000 MG: 1 INJECTION, POWDER, LYOPHILIZED, FOR SOLUTION INTRAVENOUS at 12:37

## 2020-04-28 RX ADMIN — Medication 15 MCG/MIN: at 03:23

## 2020-04-28 RX ADMIN — HEPARIN SODIUM 14.9 UNITS/KG/HR: 10000 INJECTION, SOLUTION INTRAVENOUS at 17:17

## 2020-04-28 RX ADMIN — VANCOMYCIN HYDROCHLORIDE 1500 MG: 10 INJECTION, POWDER, LYOPHILIZED, FOR SOLUTION INTRAVENOUS at 23:33

## 2020-04-28 RX ADMIN — SODIUM CHLORIDE, PRESERVATIVE FREE 10 ML: 5 INJECTION INTRAVENOUS at 20:30

## 2020-04-28 RX ADMIN — Medication 15 ML: at 20:29

## 2020-04-28 RX ADMIN — DESMOPRESSIN ACETATE 40 MG: 0.2 TABLET ORAL at 20:29

## 2020-04-28 RX ADMIN — SODIUM CHLORIDE 250 ML: 0.9 INJECTION, SOLUTION INTRAVENOUS at 14:45

## 2020-04-28 NOTE — PROGRESS NOTES
[facility]  Speech Language Pathology    Date: 4/28/2020  Patient Name: Hunter Byrd  YOB: 1950   AGE: 78 y.o. MRN: [id number]        Patient Not Available for Speech Therapy     Due to:  [] Testing  [] Hemodialysis  [] Cancelled by RN  [] Surgery   [] Intubation/Sedation/Pain Medication  [] Medical instability  [x] Other: Pt. Attempted in AM:  Pt. With nursing care. Pt. Attempted in PM:  Pt. With nursing care     Next scheduled treatment: 4/29    Completed by: Norris Denver, M.A.  CCC-SLP

## 2020-04-28 NOTE — PROGRESS NOTES
Physical Therapy  Facility/Department: [address]  Daily Treatment Note  NAME: Yris Villasenor  : 1950  MRN: [id number]    Date of Service: 2020    Discharge Recommendations:  Patient would benefit from continued therapy after discharge   PT Equipment Recommendations  Equipment Needed: No    Assessment   Body structures, Functions, Activity limitations: Decreased functional mobility ; Decreased strength;Decreased endurance;Decreased sensation;Decreased balance  Assessment: Pt required Mod A +2 for bed mobility and to stand with Norrine Hernandez. Pt will require continued PT after DC to return to prior function. Prognosis: Good  PT Education: Transfer Training;General Safety; Functional Mobility Training  Barriers to Learning: Aphasia  REQUIRES PT FOLLOW UP: Yes  Activity Tolerance  Activity Tolerance: Patient limited by fatigue;Patient limited by endurance     Patient Diagnosis(es): The encounter diagnosis was Cerebrovascular accident (CVA), unspecified mechanism ([facility] [address] has a past medical history of Atrial fibrillation ([facility] [address] and Stroke ([facility] [address] has no past surgical history on file. Restrictions  Restrictions/Precautions  Restrictions/Precautions: Fall Risk, Up as Tolerated  Required Braces or Orthoses?: No  Subjective   General  Chart Reviewed: Yes  Response To Previous Treatment: Patient with no complaints from previous session. Family / Caregiver Present: No  Subjective  Subjective: Pt and RN agreeable to PT. Pt alert in bed upon arrival, pleasant and cooperative t/o treatment.  Expressive aphasia   General Comment  Comments: Pt left in bed with call light within reach  Pain Screening  Patient Currently in Pain: Denies  Vital Signs  Patient Currently in Pain: Denies       Orientation  Orientation  Overall Orientation Status: (unable to fully assess secondary to aphasia)  Orientation Level: Oriented to situation;Oriented to person  Cognition      Objective   Bed mobility  Supine to Sit: Moderate

## 2020-04-28 NOTE — PROGRESS NOTES
posterior lean ~ 15 minutes)  Standing Balance: Moderate assistance(pt requires Mod A X2 with use of Illene Hensen)  Standing Balance  Time: ~1 minute X2 trials  Activity: with use of Illene Hensen; RUE supported and R lean  Functional Mobility  Functional Mobility Comments: unsafe to attempt functional mobility d/t mod a X2 required for transfers and standing   Bed mobility  Supine to Sit: Moderate assistance;2 Person assistance  Sit to Supine: Moderate assistance;2 Person assistance  Scooting: Moderate assistance;2 Person assistance  Comment: VCs throughout for technique  Transfers  Sit to stand:  Moderate assistance;2 Person assistance  Stand to sit: Moderate assistance;2 Person assistance  Transfer Comments: with Illene Hensen; RUE requires hand over hand assist for placement onto SS                Neuromuscular Education  Neuromuscular education: Yes  NDT Treatment: Upper extremity  Weight Bearing  Weight Bearing Technique: Yes  RUE Weight Bearing: Extended arm seated  Response To Weight Bearing Technique: pt sat EOB and weight shifted while seated bearing weight to RUE; RUE requiring support at elbow throughout    Cognition  Overall Cognitive Status: WFL                Upper Extremity Function  NDT Treatment: Upper extremity  Type of ROM/Therapeutic Exercise  Type of ROM/Therapeutic Exercise: AAROM  Comment: pt educated on AAROM to RUE and AROM to LUE; good return                    Plan   Plan  Times per week: 4-5x/wk   Current Treatment Recommendations: Functional Mobility Training, Endurance Training, Safety Education & Training, Equipment Evaluation, Education, & procurement, Patient/Caregiver Education & Training, Self-Care / ADL    AM-PAC Score             Goals  Short term goals  Time Frame for Short term goals: by discharge, pt will  Short term goal 1: demo min A for UE ADL activities with set up   Short term goal 2: demo mod A for LE ADL activities with set up   Short term goal 3: demo min A for functional

## 2020-04-28 NOTE — PROGRESS NOTES
ENDOVASCULAR NEUROSURGERY PROGRESS NOTE  4/28/2020 8:52 AM  Subjective:   Admit Date: 4/25/2020  PCP: No primary care provider on file. No new acute events. Objective:   Vitals: BP (!) 162/75   Pulse 93   Temp 99.6 °F (37.6 °C) (Oral)   Resp 25   Ht 5' 10\" (1.778 m)   Wt (!) 320 lb (145.2 kg)   SpO2 93%   BMI 45.92 kg/m²     General appearance: Lying in bed, NAD,  Lungs: CTAB  Heart: RRR  Abdomen: soft, NTND, bowel sounds normal    Neuro exam: Follows some simple commands, left gaze preference. CN II-XII: pupils 2 mm reactive to light bilaterally, VFF. Right upper extremity briefly against gravity with encouragement. He moves right lower extremity against gravity. .  Moving left upper and left lower extremities against gravity. Medications and labs:   Scheduled Meds:   [Held by provider] lisinopril  10 mg Oral Daily    atorvastatin  40 mg Oral Nightly    metoprolol tartrate  12.5 mg Oral BID    sennosides-docusate sodium  2 tablet Oral Daily    chlorhexidine  15 mL Mouth/Throat BID    sodium chloride flush  10 mL Intravenous 2 times per day    aspirin  81 mg Oral Daily    Or    aspirin  300 mg Rectal Daily     Continuous Infusions:   norepinephrine Stopped (04/28/20 0635)    clevidipine Stopped (04/28/20 0003)    dextrose      heparin (porcine) 16.9 Units/kg/hr (04/28/20 0757)     CBC:   Recent Labs     04/26/20  0609 04/27/20  0635 04/28/20  0622   WBC 10.3 12.3* 24.6*   HGB 13.1 14.5 15.0    221 297     BMP:    Recent Labs     04/26/20  0609 04/27/20  0635 04/28/20  0622    134* 134*   K 3.9 3.9 3.9    98 101   CO2 23 21 18*   BUN 10 11 28*   CREATININE 1.01 0.79 1.60*   GLUCOSE 129* 128* 144*     Hepatic: No results for input(s): AST, ALT, ALB, BILITOT, ALKPHOS in the last 72 hours. Troponin: No results for input(s): TROPONINI in the last 72 hours. BNP: No results for input(s): BNP in the last 72 hours.   Lipids:   Recent Labs     04/25/20  1440   CHOL 111

## 2020-04-28 NOTE — PROGRESS NOTES
Richardson care provided. Foreskin again found retracted. Area of denuded but dry skin noted on the glans penis. Surgilube used to allow placement of the foreskin into its anatomical position. Please ensure patient receives appropriate ashlyn care and the foreskin is allowed to settle in its correct position. D/w primary RN.

## 2020-04-28 NOTE — CONSULTS
[facility]  Initial Consult Note  Date: 4/28/2020    Hospital day :3     Impression:   1. Left MCA stroke that is post mechanical thrombectomy  2. Resolved severe symptomatic bradycardia  3. Acute hypoxic respiratory failure and concern for aspiration pneumonia  4. Atrial fibrillation    Recommendations   · I agree with empiric antimicrobial therapy with Zosyn and vancomycin for aspiration pneumonia. · While the patient does have increased thrombotic events, fever the worsening leukocytosis goes against this being a novel coronavirus infection though there is lymphopenia  · Patient clearly has coarse rhonchi on physical exam and the O2 saturation is improved with positive pressure ventilation  · The patient does not need 2019 novel coronavirus infection testing  · Continue the empiric antimicrobial therapy, check MRSA PCR if negative the vancomycin can be stopped    Chief complaint/reason for consultation:   Aspiration pneumonia, 2019 novel coronavirus infection. History of Present Illness:   Mazin Alfred is a 78y.o.-year-old male who was initially admitted on 4/25/2020. Claudia Siddiqi has a history of atrial fibrillation, prior stroke and was in his usual state of health until he was found about 5 hours later moaning and grunting and he was taken to the emergency room where he was noted to have right-sided upper and lower extremity weakness, right-sided facial weakness, and aphasia. A CT of the head was done which was unremarkable and the patient had a CTA of the head and neck done and subsequently had a cerebral angiogram with mechanical thrombectomy of the left M2 MCA. The patient did have some active vomiting, bradycardia treated with atropine and he did have hypotension which was treated. The patient had a repeat CT of the head 427 and had some hypotension requiring some Levophed.   He developed fever to 38.5 and white blood cell count did increase with episodes of hypoxia requiring CPAP on file    Drug use: Not on file    Sexual activity: Not on file   Lifestyle    Physical activity     Days per week: Not on file     Minutes per session: Not on file    Stress: Not on file   Relationships    Social connections     Talks on phone: Not on file     Gets together: Not on file     Attends Scientologist service: Not on file     Active member of club or organization: Not on file     Attends meetings of clubs or organizations: Not on file     Relationship status: Not on file    Intimate partner violence     Fear of current or ex partner: Not on file     Emotionally abused: Not on file     Physically abused: Not on file     Forced sexual activity: Not on file   Other Topics Concern    Not on file   Social History Narrative    Not on file     Family History:   History reviewed. No pertinent family history. Allergies:   Patient has no known allergies. Review of Systems: The review of system was limited as the patient is not able to speak. He is able to understand and does not and shake his head to certain questions. When I asked him if he was feeling febrile he said no and when I asked if his breathing was difficult he said no though the nurse was at bedside reported he is actually improved and required supplemental oxygen at significant levels. Physical Examination :   /62   Pulse 100   Temp 99.6 °F (37.6 °C)   Resp 28   Ht 5' 10\" (1.778 m)   Wt (!) 320 lb (145.2 kg)   SpO2 96%   BMI 45.92 kg/m²     Temperature Range: Temp: 99.6 °F (37.6 °C) Temp  Av.3 °F (37.4 °C)  Min: 97.7 °F (36.5 °C)  Max: 101.3 °F (38.5 °C)  General Appearance: Awake, alert, and in no apparent distress  Head: Normocephalic, without obvious abnormality, atraumatic  Eyes: pupils equal, round, and reactive to light  ENT: Oropharynx not well evaluated the patient does have an NG tube in place and oxygen in the nares  Neck: Supple, without lymphadenopathy.    Pulmonary/Chest: Coarse rhonchi appreciated intracranial abnormality. 3. There is question of a small chronic infarct involving the left parietal lobe. Findings were acute acute to Dr. Sera Carreon on 4/25/2020 at 7:27 am.         ONE XRAY VIEW OF THE CHEST 4/28/2020 8:57 am  FINDINGS:  Overall, no significant change in chest findings given differences and inspiration. No focal lung consolidation is seen to suggest pneumonia. CTA OF THE HEAD AND NECK WITH CONTRAST 4/25/2020 7:53 am:  FINDINGS:  1. Occlusion involving a proximal left M2 branch at the level of the left sylvian fissure. There appears to be delayed enhancement of the distal branches. 2. There is question of blurring of the gray-white differentiation involving the left insular cortex as well as the left frontal lobe near the vertex. 3. No focal stenosis otherwise seen of the [id number]. 4. No flow limiting stenosis of the cervical carotid/vertebral arteries. Findings were communicated to Dr. Sera Carreon on 4/25/2020 at 8:36 am.         MRI OF THE BRAIN WITHOUT CONTRAST  4/25/2020 3:33 pm   FINDINGS:  Areas of acute infarct in the left insular cortex and left frontotemporal region and a smaller area of acute infarct in the left parieto-occipital region consistent with left middle cerebral artery territory infarcts. Mild cerebral atrophy. Chronic small vessel ischemic changes. Cultures:     MRSA DNA Probe, Nasal [[id number]]    Order Status: Sent Specimen: Nares    Culture, Blood 1 [[id number]] Collected: 04/28/20 1212   Order Status: Completed Specimen: Blood Updated: 04/28/20 1508    Specimen Description . BLOOD    Special Requests 10ML    Culture NO GROWTH 2 HOURS   Culture, Blood 1 [[id number]] Collected: 04/28/20 1212   Order Status: Completed Specimen: Blood Updated: 04/28/20 1508    Specimen Description . BLOOD    Special Requests 7ML R H    Culture NO GROWTH 1 HOUR   Culture, Urine [[id number]] Collected: 04/26/20 0745   Order Status: Completed Specimen: Urine, clean catch

## 2020-04-28 NOTE — PROGRESS NOTES
Daily Progress Note  Neuro Critical Care    Patient Name: Ira Helms  Patient : 1950  Room/Bed: 0990/5383-93  Code Status: FULL  Allergies: No Known Allergies    CHIEF COMPLAINT:      Aphasic     INTERVAL HISTORY    Initial Presentation (Admitted 20): The patient is a 78 y.o. male who presented with wake up aphasia, dysarthria, right sided weakness, and left sided gaze. Pt woke up with these symptoms between 6426-1868. Pt reported to go to sleep, per family, approximately 0200. Pt had elevated SBP >200 for EMS and was given Labetalol en route to ER. CT head completed in mobile stroke unit which was unremarkable, no hemorrhage. Not a tPA candidate because he was outside the window. CTA h/n showing occlusion of left proximal M2 at the level of the sylvian fissure. He was taken for emergent thrombectomy, TICI 2C.     Patient's son states that pt has a known history of atrial fibrillation but has not been on anticoagulation or any medication for \"a very long time\".    When patient arrived to ICU, he quickly experienced bradycardia down to as low as 28 bpm. Pt actively vomiting and was suctioned aggressively. Pt received 0.5mg Atropine with resolution of the bradycardia, HR then into 60s. Hypertensive on Cleviprex. Empirically given 2mg Mg. Monitor showing AF. Started on Heparin infusion post thrombectomy. : No further episodes of bradycardia or emesis overnight. Goal -170; patient on Clevidpine infusion. CXR showed mild vascular congestion. Given 10mg IVP Lasix. Repeat EKG showed afib, no further heart block. Lopressor 12.5mg BID started. Remains on heparin infusion, PTT 34.1. CT head ordered for tomorrow morning, will hold if PTT not therapeutic. Exam improving, able to follow verbal commands. :  Kept NPO overnight. BM overnight. Plan for Repeat head CT, as pt is therapeutic on Heparin. 1 episode of sustained VT this AM, asymptomatic.  EKG obtained after event showed 74.2  Min: 51  Max: 112  Respiration Range: Resp  Av  Min: 22  Max: 32  Current Pulse Ox: SpO2: 94 %  24HR Pulse Ox Range: SpO2  Av.4 %  Min: 87 %  Max: 100 %  Patient Vitals for the past 12 hrs:   BP Temp Temp src Pulse Resp SpO2   20 1502 (!) 144/67 -- -- 81 28 --   20 1403 137/71 -- -- 87 28 --   20 1203 (!) (P) 157/69 (P) 99.1 °F (37.3 °C) -- 96 26 --   20 1200 -- -- -- 85 -- --   20 1103 (!) (P) 164/82 -- -- 86 25 --   20 1002 (!) 148/73 -- -- 70 25 --   20 0903 (!) (P) 159/72 -- -- 81 27 --   20 0826 (!) 162/75 -- -- 93 -- --   20 0801 (!) 146/81 (P) 98.4 °F (36.9 °C) -- 77 (!) 32 94 %   20 0800 -- -- -- 78 -- --   20 0743 -- -- -- -- 25 93 %   20 0718 114/64 -- -- 68 (!) 32 93 %   20 0703 (!) 108/54 -- -- 72 (!) 31 92 %   20 0648 131/74 -- -- 71 28 93 %   20 0633 128/67 -- -- 63 28 97 %   20 0618 (!) 148/56 -- -- 63 27 97 %   20 0603 -- -- -- 76 24 91 %   20 0548 (!) 143/69 -- -- 51 29 93 %   20 0533 129/61 -- -- 59 (!) 31 93 %   20 0518 (!) 140/62 -- -- 55 28 96 %   20 0503 129/89 -- -- 64 26 96 %   20 0448 (!) 141/64 -- -- 61 29 95 %   20 0433 134/72 -- -- 56 27 95 %   20 0418 (!) 144/55 -- -- 53 28 97 %   20 0403 (!) 145/71 99.6 °F (37.6 °C) Oral 57 28 97 %   20 0348 (!) 161/67 -- -- 51 25 97 %   20 0345 -- -- -- -- 25 98 %   20 0333 126/62 -- -- 61 26 97 %   20 0318 130/68 -- -- 55 26 98 %     Estimated body mass index is 45.92 kg/m² as calculated from the following:    Height as of this encounter: 5' 10\" (1.778 m). Weight as of this encounter: 320 lb (145.2 kg).  []<16 Severe malnutrition  []16-16.99 Moderate malnutrition  []17-18.49 Mild malnutrition  []18.5-24.9 Normal  []25-29.9 Overweight (not obese)  []30-34.9 Obese class 1 (Low Risk)  []35-39.9 Obese class 2 (Moderate Risk)  [x]? 40 Obese class 3 (High Risk)    RECENT LABS:   Lab Results   Component Value Date    WBC 24.6 (H) 04/28/2020    HGB 15.0 04/28/2020    HCT 43.5 04/28/2020     04/28/2020    CHOL 111 04/25/2020    TRIG 80 04/25/2020    HDL 46 04/25/2020     04/28/2020    K 3.7 04/28/2020     04/28/2020    CREATININE 1.38 (H) 04/28/2020    BUN 31 (H) 04/28/2020    CO2 19 (L) 04/28/2020    TSH 2.38 04/26/2020    INR 1.1 04/25/2020    LABA1C 5.6 04/25/2020     24 HOUR INTAKE/OUTPUT:    Intake/Output Summary (Last 24 hours) at 4/28/2020 1507  Last data filed at 4/28/2020 1200  Gross per 24 hour   Intake 1742.67 ml   Output 400 ml   Net 1342.67 ml       IMAGING:   Xr Abdomen (kub) (single Ap View)    Result Date: 4/25/2020  EXAMINATION: ONE SUPINE XRAY VIEW(S) OF THE ABDOMEN 4/25/2020 8:50 am COMPARISON: None. HISTORY: ORDERING SYSTEM PROVIDED HISTORY: look for metal for MRI TECHNOLOGIST PROVIDED HISTORY: look for metal for MRI FINDINGS: There is a nonobstructive bowel gas pattern. Degenerative changes involve the lumbar spine and bilateral hips. There is no radiopaque foreign body. 1. No acute abnormality. Ct Head Wo Contrast    Result Date: 4/25/2020  EXAMINATION: CT OF THE HEAD WITHOUT CONTRAST  4/25/2020 7:03 am TECHNIQUE: CT of the head was performed without the administration of intravenous contrast. Dose modulation, iterative reconstruction, and/or weight based adjustment of the mA/kV was utilized to reduce the radiation dose to as low as reasonably achievable. COMPARISON: None. HISTORY: ORDERING SYSTEM PROVIDED HISTORY: Stroke TECHNOLOGIST PROVIDED HISTORY: Stroke Initial evaluation. FINDINGS: Motion degrades images limiting evaluation. BRAIN/VENTRICLES: There is no acute intracranial hemorrhage, mass effect or midline shift. No abnormal extra-axial fluid collection. There is question of a small chronic infarct involving the left parietal lobe.   Otherwise, the gray-white differentiation is maintained without evidence of an acute infarct. There is no evidence of hydrocephalus. There are areas of hypoattenuation in the periventricular and subcortical white matter, which is nonspecific, but may represent chronic microvasvular ischemic change. ORBITS: The visualized portion of the orbits demonstrate no acute abnormality. SINUSES: A mucous retention cyst is seen in the left maxillary sinus. The mastoid air cells demonstrate no acute abnormality. SOFT TISSUES/SKULL:  No acute abnormality of the visualized skull or soft tissues. 1. Motion partially limits evaluation. 2. No convincing acute intracranial abnormality. 3. There is question of a small chronic infarct involving the left parietal lobe. Findings were acute acute to Dr. Lisa Rockwell on 4/25/2020 at 7:27 am.     Ir Angiogram Carotid C Erebral Bilateral    Result Date: 4/25/2020  Date of Service: 4/25/2020 Diagnosis: Acute Ischemic Stroke Procedure: Mechanical Thrombectomy for Acute Left MCA Clot Patient arrived to the angio suite at: 18:15 Puncture obtained at: 18:44 Vascular access was removed at: 19:29 [place]: Obinna Jacobson MD. Contrast: 110 Cc Of Visipaque-270 Fluoroscopy time: 30.4 minutes Procedures: 1. Trans-arterial endovascular mechanical thrombectomy using Solitaire Device with Combined Suctioning for acute Left MCA clot 2. Left Internal Carotid Artery Cerebral Angiography (pre, during and after thrombectomy) 3. Left Common Carotid Artery Angiogram 4. Right Common Femoral Artery Angiogram Neurointerventionalist: Elinor Kline MD. Access: Right Common Femoral Artery Anesthesia: MAC Indication and Clinical Hx: 78years old male with history of A. fib not on anticoagulation who presents with right-sided weakness and left gaze and aphasia, patient was not a candidate for TPA because results out of window. CT head with no acute finding. CTA head and neck with left M2 occlusion. Patient was taken to mechanical thrombectomy.  Consent: after explaining the risks and benefit to the Hemostasis was maintained using 8F Angioseal closing device followed by manual pressure for 10 minutes. Impression: 1. Left MCA M1 proximal clot resulting in acute ischemic stroke due to cardioembolic event treated with 3 passes of mechanical thrombectomy using Solitaire 4 x 40 mm and a large bore reperfusion catheter resulted in tici 2c with persistent distal end at C6 occlusion that is not amenable to mechanical thrombectomy. Dr. Jazzy Henley dictated this invasive procedure. Dr. Constantino Brooks was present for all procedural and imaging components of this case. Examination was reviewed and reported findings confirmed and evaluated by Dr. Constantino Brooks. Xr Chest Portable    Result Date: 4/26/2020  EXAMINATION: ONE XRAY VIEW OF THE CHEST 4/26/2020 5:58 am COMPARISON: April 25, 2020 HISTORY: ORDERING SYSTEM PROVIDED HISTORY: Eval lungs, fluid/congestion TECHNOLOGIST PROVIDED HISTORY: Eval lungs, fluid/congestion FINDINGS: Shallow inspiration. Pulmonary vascular congestion with interstitial edema. Small left effusion. No pneumothorax. The heart remains enlarged. Similar vascular congestion/edema. Xr Chest Portable    Result Date: 4/25/2020  EXAMINATION: ONE SUPINE XRAY VIEW(S) OF THE ABDOMEN; ONE XRAY VIEW OF THE CHEST 4/25/2020 2:44 pm COMPARISON: 04/25/2020 HISTORY: ORDERING SYSTEM PROVIDED HISTORY: Confirmation of course of NG/OG/NE tube and location of tip of tube TECHNOLOGIST PROVIDED HISTORY: Confirmation of course of NG/OG/NE tube and location of tip of tube Portable? ->Yes Reason for Exam: NG placement/  AP supine Acuity: Unknown Type of Exam: Unknown FINDINGS: Chest: Shallow inspiration. Cardiomegaly. Enteric tube passes beneath the diaphragm. Pulmonary vascular congestion. No effusion. No pneumothorax. No acute osseous abnormality. Abdomen: Enteric tube courses into the expected location of the stomach. The side port is just beyond the GE junction. About 5 cm for better positioning.  The visualized bowel is normal.  Probable splenomegaly. Enteric tube terminates in the expected location of the stomach with the side port is near the GE junction. Consider advancing about 5 cm for better position. Cardiomegaly and pulmonary vascular congestion may be magnified in part by shallow inspiration but an element of volume overload is considered. Probable splenomegaly. Xr Chest Portable    Result Date: 4/25/2020  EXAMINATION: ONE XRAY VIEW OF THE CHEST 4/25/2020 8:50 am COMPARISON: None available. HISTORY: ORDERING SYSTEM PROVIDED HISTORY: look for metal for MRI TECHNOLOGIST PROVIDED HISTORY: look for metal for MRI FINDINGS: There is bibasilar atelectasis. The cardiac silhouette is enlarged. There is no pneumothorax or pleural effusion. There is no radiopaque foreign body. Degenerative changes involve the bilateral shoulders. 1. Cardiomegaly. Xr Abdomen For Ng/og/ne Tube Placement    Result Date: 4/25/2020  EXAMINATION: ONE SUPINE XRAY VIEW(S) OF THE ABDOMEN; ONE XRAY VIEW OF THE CHEST 4/25/2020 2:44 pm COMPARISON: 04/25/2020 HISTORY: ORDERING SYSTEM PROVIDED HISTORY: Confirmation of course of NG/OG/NE tube and location of tip of tube TECHNOLOGIST PROVIDED HISTORY: Confirmation of course of NG/OG/NE tube and location of tip of tube Portable? ->Yes Reason for Exam: NG placement/  AP supine Acuity: Unknown Type of Exam: Unknown FINDINGS: Chest: Shallow inspiration. Cardiomegaly. Enteric tube passes beneath the diaphragm. Pulmonary vascular congestion. No effusion. No pneumothorax. No acute osseous abnormality. Abdomen: Enteric tube courses into the expected location of the stomach. The side port is just beyond the GE junction. About 5 cm for better positioning. The visualized bowel is normal.  Probable splenomegaly. Enteric tube terminates in the expected location of the stomach with the side port is near the GE junction. Consider advancing about 5 cm for better position.  Cardiomegaly and pulmonary confluent areas of high signal in the periventricular white matter and centrum semiovale that are likely related to chronic small vessel ischemic disease. There is no midline shift or mass effect. ORBITS: The visualized portion of the orbits demonstrate no acute abnormality. SINUSES: There is a mucous retention cyst or polyp in the left maxillary sinus. The remainder of the sinuses are clear. The mastoid air cells are clear. BONES/SOFT TISSUES: The bone marrow signal intensity appears normal. The soft tissues demonstrate no acute abnormality. Areas of acute infarct in the left insular cortex and left frontotemporal region and a smaller area of acute infarct in the left parieto-occipital region consistent with left middle cerebral artery territory infarcts. Mild cerebral atrophy. Chronic small vessel ischemic changes. Labs and Images reviewed with:  [x] Dr. Francesca Dupree. Rad    [] Dr. Ingrid Velazquez  [] Dr. Jovani Urrutia  [] There are no new interval images to review. PHYSICAL EXAM       CONSTITUTIONAL:  Unkept. Expressive aphasia with improving receptive aphasia; no usable verbal output. Follows verbal commands and nods appropriately. HEAD:  normocephalic, atraumatic    EYES:  PERRL. Left gaze preference, crosses midline to right. ENT:  moist mucous membranes   NECK:  supple, symmetric   LUNGS:  Equal air entry bilaterally, clear   CARDIOVASCULAR:  normal s1 / s2, afib. Bilateral lower extremity edema, vascular discoloration, palpable DP pulses bilaterally   ABDOMEN:  Soft, no rigidity   NEUROLOGIC:  Mental Status:  Aphasic. Following verbal commands, nodding appropriately.              Cranial Nerves:    II: Visual fields:  abnormal - no blink to threat, right hemianopsia  III: Pupils:  equal, round, reactive to light  III,IV,VI: Extra Ocular Movements: abnormal - left gaze preference, able to cross midline to right  VII: Facial strength: abnormal - right facial droop improving     Motor Exam:

## 2020-04-28 NOTE — PROGRESS NOTES
[facility] Wound Ostomy Continence Nurse  Consult Note       NAME:  Yossi Donahue  MEDICAL RECORD NUMBER:  [id number]  AGE: 78 y.o. GENDER: male  : 1950  TODAY'S DATE:  2020    Subjective:     Reason for WOCN Evaluation and Assessment: \"wound\" \"discoloration lower extremities\"       Yossi Donahue is a 78 y.o. male referred by:   [x] Physician  [] Nursing  [] Other:             PAST MEDICAL HISTORY        Diagnosis Date    Atrial fibrillation ([facility]     Stroke ([facility]        PAST SURGICAL HISTORY    History reviewed. No pertinent surgical history. FAMILY HISTORY    History reviewed. No pertinent family history. SOCIAL HISTORY    Social History     Tobacco Use    Smoking status: Not on file   Substance Use Topics    Alcohol use: Not on file    Drug use: Not on file       ALLERGIES    No Known Allergies        Objective:      BP (!) 162/75   Pulse 93   Temp 99.6 °F (37.6 °C) (Oral)   Resp 25   Ht 5' 10\" (1.778 m)   Wt (!) 320 lb (145.2 kg)   SpO2 93%   BMI 45.92 kg/m²   Harrison Risk Score: Harrison Scale Score: 12    LABS    CBC:   Lab Results   Component Value Date    WBC 24.6 2020    RBC 4.96 2020    HGB 15.0 2020     CMP:  Albumin:  No results found for: LABALBU  PT/INR:    Lab Results   Component Value Date    PROTIME 11.3 2020    INR 1.1 2020     HgBA1c:    Lab Results   Component Value Date    LABA1C 5.6 2020     PTT: No components found for: LABPTT      Assessment:     Patient Active Problem List   Diagnosis    Cerebrovascular accident (CVA) ([facility]    Acute ischemic left MCA stroke ([facility]    Bradycardia    Arterial line in place       Measurements:  No wounds found. Poor hygiene, thick plaques and scales consistent with poorly managed lymphedema.      Skin Integrity  [] Normal [x] Dry  [x]Rough []Moist []Rash  Location of problem area/s:  [] Wound: Location/Description   [x] Fibrosis: Location/Description: BLE and feet   [x] Keratosis:

## 2020-04-28 NOTE — PLAN OF CARE
PROVIDE ADEQUATE OXYGENATION WITH ACCEPTABLE SP02/ABG'S    [x]  IDENTIFY APPROPRIATE OXYGEN THERAPY  [x]   MONITOR SP02/ABG'S AS NEEDED   [x]   PATIENT EDUCATION AS NEEDED     NON INVASIVE VENTILATION  PROVIDE OPTIMAL VENTILATION/ACCEPTABLE SP02  IMPLEMENT NON INVASIVE VENTILATION PROTOCOL  ASSESSMENT SKIN INTEGRITY  PATIENT EDUCATION AS NEEDED      BIPAP AS NEEDED tanvi blackwell, RCPPatient Assessment complete. Acute CVA (cerebrovascular accident) ([facility] [I63.9]  Acute CVA (cerebrovascular accident) ([facility] [I63.9] . Vitals:    04/28/20 1603   BP: 136/62   Pulse: 100   Resp: 28   Temp:    SpO2:    . Patients home meds are   Prior to Admission medications    Not on File   . Recent Surgical History: Thrombectomy  Assessment   Pt on NC @ 5lpm  Pt denies asthma, COPD, ANAHI  Pt states he worked as a  and never smoked  RR 20  Breath Sounds: clear/decreased upper airway congestion      Bronchodilator assessment at level  1  Hyperinflation assessment at level   Secretion Management assessment at level      [x]    Bronchodilator Assessment  BRONCHODILATOR ASSESSMENT SCORE  Score 0 1 2 3 4 5   Breath Sounds   []  Patient Baseline [x]  No Wheeze good aeration []  Faint, scattered wheezing, good aeration []  Expiratory Wheezing and or moderately diminished []  Insp/Exp wheeze and/or very diminished []  Insp/Exp and/ or marked distress   Respiratory Rate   []  Patient Baseline []  Less than 20 []  Less than 20 [x]  20-25 []  Greater than 25 []  Greater than 25   Peak flow % of Pred or PB [x]  NA   []  Greater than 90%  []  81-90% []  71-80% []  Less than or equal to 70%  or unable to perform []  Unable due to Respiratory Distress   Dyspnea re []  Patient Baseline [x]  No SOB [x]  No SOB []  SOB on exertion []  SOB min activity []  At rest/acute   e FEV% Predicted       [x]  NA []  Above 69%  []  Unable []  Above 60-69%  []  Unable []  Above 50-59%  []  Unable []  Above 35-49%  []  Unable []  Less than 2. 84 3.04 3.24 3.45 58 - 61 2.32 2.54 2.76 2.99 3.23 3.47 3.72 3.98   62 - 65 1.99 2.17 2.35 2.54 2.73 2.93 3.13 3.34 62 - 65 2.19 2.40 2.62 2.85 3.09 3.33 3.58 3.84   66 - 69 1.88 2.05 2.23 2.42 2.61 2.81 3.02 3.23 66 - 69 2.04 2.26 2.48 2.71 2.95 3.19 3.44 3.70   70+ 1.82 1.99 2.17 2.36 2.55 2.75 2.95 3.16 70+ 1.97 2.19 2.41 2.64 2.87 3.12 3.37 3.62             Predicted Peak Expiratory Flow Rate                                       Height (in)  Female       Height (in) Male           Age 64 62 64 63 57 71 78 74 Age            21 344 357 372 387 402 417 432 446  60 62 64 66 68 70 72 74 76   25 337 352 366 381 396 411 426 441 25 447 476 [phone] [phone]   30 329 344 359 374 389 404 419 434 30 437 466 494 523 [phone] 667   35 322 337 351 366 381 396 411 426 35 426 455 484 512 541 [phone]   40 314 329 344 359 374 389 404 419 40 416 445 473 [phone] 617 647   45 307 322 336 351 366 381 396 411 45 405 434 463 491 520 549 577 606 636   50 299 314 329 344 359 374 389 404 50 395 424 452 481 510 538 567 596 625   55 292 307 321 336 351 366 381 396 55 384 413 442 470 499 528 556 585 615   60 284 299 314 329 344 359 374 389 60 374 403 431 460 489 517 546 575 605   65 277 292 306 321 336 351 366 381 65 363 392 421 449 478 507 535 564 594   70 269 284 299 314 329 344 359 374 70 353 382 410 439 468 [phone] 583   75 261 274 289 305 319 334 348 364 75 344 372 400 429 458 487 515 544 573   80 253 266 282 296 312 327 342 356 80 335 362 390 419 448 368 [phone]

## 2020-04-28 NOTE — PROGRESS NOTES
Pharmacy Note  Vancomycin Consult    Lisbeth Mcmullen is a 78 y.o. male started on Vancomycin for empiric treatment; consult received from Dr. Silva Mojica to manage therapy. Also receiving the following antibiotics: Zosyn. Patient Active Problem List   Diagnosis    Cerebrovascular accident (CVA) ([facility]    Acute ischemic left MCA stroke (HCC)    Bradycardia    Arterial line in place       Allergies:  Patient has no known allergies. Temp max: 101.3    Recent Labs     04/27/20  0635 04/28/20  0622   BUN 11 28*       Recent Labs     04/27/20  0635 04/28/20  0622   CREATININE 0.79 1.60*       Recent Labs     04/27/20  0635 04/28/20  0622   WBC 12.3* 24.6*         Intake/Output Summary (Last 24 hours) at 4/28/2020 1101  Last data filed at 4/28/2020 2413  Gross per 24 hour   Intake 1682.67 ml   Output 400 ml   Net 1282.67 ml       Culture Date      Source                       Results  4/26/20                Urine                          No growth  4/28/20                Blood x 2                    Pending         Ht Readings from Last 1 Encounters:   04/25/20 5' 10\" (1.778 m)        Wt Readings from Last 1 Encounters:   04/25/20 (!) 320 lb (145.2 kg)         Body mass index is 45.92 kg/m². Estimated Creatinine Clearance: 64 mL/min (A) (based on SCr of 1.6 mg/dL (H)). Goal Trough Level: 15-20 mcg/mL    Assessment/Plan:  Will initiate Vancomycin with a one time loading dose of 2000 mg x1, followed by 1500 mg IV every 12 hours. Timing of trough level will be determined based on culture results, renal function, and clinical response. Thank you for the consult. Will continue to follow.   Yazmin Ochoa [facility]  4/28/2020  11:03 AM

## 2020-04-29 LAB
ABSOLUTE EOS #: 0.09 K/UL (ref 0–0.44)
ABSOLUTE IMMATURE GRANULOCYTE: 0.08 K/UL (ref 0–0.3)
ABSOLUTE LYMPH #: 0.93 K/UL (ref 1.1–3.7)
ABSOLUTE MONO #: 0.9 K/UL (ref 0.1–1.2)
ANION GAP SERPL CALCULATED.3IONS-SCNC: 14 MMOL/L (ref 9–17)
BASOPHILS # BLD: 1 % (ref 0–2)
BASOPHILS ABSOLUTE: 0.08 K/UL (ref 0–0.2)
BUN BLDV-MCNC: 35 MG/DL (ref 8–23)
BUN/CREAT BLD: ABNORMAL (ref 9–20)
CALCIUM SERPL-MCNC: 8.3 MG/DL (ref 8.6–10.4)
CHLORIDE BLD-SCNC: 101 MMOL/L (ref 98–107)
CO2: 22 MMOL/L (ref 20–31)
CREAT SERPL-MCNC: 1.26 MG/DL (ref 0.7–1.2)
DIFFERENTIAL TYPE: ABNORMAL
EOSINOPHILS RELATIVE PERCENT: 1 % (ref 1–4)
GFR AFRICAN AMERICAN: >60 ML/MIN
GFR NON-AFRICAN AMERICAN: 57 ML/MIN
GFR SERPL CREATININE-BSD FRML MDRD: ABNORMAL ML/MIN/{1.73_M2}
GFR SERPL CREATININE-BSD FRML MDRD: ABNORMAL ML/MIN/{1.73_M2}
GLUCOSE BLD-MCNC: 196 MG/DL (ref 70–99)
HCT VFR BLD CALC: 38.9 % (ref 40.7–50.3)
HEMOGLOBIN: 12.8 G/DL (ref 13–17)
IMMATURE GRANULOCYTES: 1 %
LYMPHOCYTES # BLD: 7 % (ref 24–43)
MAGNESIUM: 2.2 MG/DL (ref 1.6–2.6)
MCH RBC QN AUTO: 28.8 PG (ref 25.2–33.5)
MCHC RBC AUTO-ENTMCNC: 32.9 G/DL (ref 28.4–34.8)
MCV RBC AUTO: 87.6 FL (ref 82.6–102.9)
MONOCYTES # BLD: 7 % (ref 3–12)
MRSA, DNA, NASAL: NORMAL
NRBC AUTOMATED: 0 PER 100 WBC
PARTIAL THROMBOPLASTIN TIME: 38.1 SEC (ref 20.5–30.5)
PARTIAL THROMBOPLASTIN TIME: 44.2 SEC (ref 20.5–30.5)
PARTIAL THROMBOPLASTIN TIME: 51.9 SEC (ref 20.5–30.5)
PARTIAL THROMBOPLASTIN TIME: 53.2 SEC (ref 20.5–30.5)
PDW BLD-RTO: 14.9 % (ref 11.8–14.4)
PLATELET # BLD: 182 K/UL (ref 138–453)
PLATELET ESTIMATE: ABNORMAL
PMV BLD AUTO: 11.7 FL (ref 8.1–13.5)
POTASSIUM SERPL-SCNC: 3.7 MMOL/L (ref 3.7–5.3)
RBC # BLD: 4.44 M/UL (ref 4.21–5.77)
RBC # BLD: ABNORMAL 10*6/UL
SEG NEUTROPHILS: 85 % (ref 36–65)
SEGMENTED NEUTROPHILS ABSOLUTE COUNT: 11.85 K/UL (ref 1.5–8.1)
SODIUM BLD-SCNC: 137 MMOL/L (ref 135–144)
SPECIMEN DESCRIPTION: NORMAL
WBC # BLD: 13.9 K/UL (ref 3.5–11.3)
WBC # BLD: ABNORMAL 10*3/UL

## 2020-04-29 PROCEDURE — 85025 COMPLETE CBC W/AUTO DIFF WBC: CPT

## 2020-04-29 PROCEDURE — 6370000000 HC RX 637 (ALT 250 FOR IP): Performed by: STUDENT IN AN ORGANIZED HEALTH CARE EDUCATION/TRAINING PROGRAM

## 2020-04-29 PROCEDURE — 6360000002 HC RX W HCPCS: Performed by: STUDENT IN AN ORGANIZED HEALTH CARE EDUCATION/TRAINING PROGRAM

## 2020-04-29 PROCEDURE — 97530 THERAPEUTIC ACTIVITIES: CPT

## 2020-04-29 PROCEDURE — 80048 BASIC METABOLIC PNL TOTAL CA: CPT

## 2020-04-29 PROCEDURE — 2060000000 HC ICU INTERMEDIATE R&B

## 2020-04-29 PROCEDURE — 2580000003 HC RX 258: Performed by: STUDENT IN AN ORGANIZED HEALTH CARE EDUCATION/TRAINING PROGRAM

## 2020-04-29 PROCEDURE — 97110 THERAPEUTIC EXERCISES: CPT

## 2020-04-29 PROCEDURE — 99232 SBSQ HOSP IP/OBS MODERATE 35: CPT | Performed by: PSYCHIATRY & NEUROLOGY

## 2020-04-29 PROCEDURE — 6370000000 HC RX 637 (ALT 250 FOR IP): Performed by: PSYCHIATRY & NEUROLOGY

## 2020-04-29 PROCEDURE — 83735 ASSAY OF MAGNESIUM: CPT

## 2020-04-29 PROCEDURE — 99223 1ST HOSP IP/OBS HIGH 75: CPT | Performed by: INTERNAL MEDICINE

## 2020-04-29 PROCEDURE — 2700000000 HC OXYGEN THERAPY PER DAY

## 2020-04-29 PROCEDURE — 6370000000 HC RX 637 (ALT 250 FOR IP): Performed by: NURSE PRACTITIONER

## 2020-04-29 PROCEDURE — 92526 ORAL FUNCTION THERAPY: CPT

## 2020-04-29 PROCEDURE — 85730 THROMBOPLASTIN TIME PARTIAL: CPT

## 2020-04-29 PROCEDURE — 99232 SBSQ HOSP IP/OBS MODERATE 35: CPT | Performed by: INTERNAL MEDICINE

## 2020-04-29 PROCEDURE — 92507 TX SP LANG VOICE COMM INDIV: CPT

## 2020-04-29 PROCEDURE — 99233 SBSQ HOSP IP/OBS HIGH 50: CPT | Performed by: PSYCHIATRY & NEUROLOGY

## 2020-04-29 RX ADMIN — SODIUM CHLORIDE, PRESERVATIVE FREE 10 ML: 5 INJECTION INTRAVENOUS at 10:20

## 2020-04-29 RX ADMIN — DESMOPRESSIN ACETATE 40 MG: 0.2 TABLET ORAL at 21:24

## 2020-04-29 RX ADMIN — PIPERACILLIN AND TAZOBACTAM 3.38 G: 3; .375 INJECTION, POWDER, FOR SOLUTION INTRAVENOUS at 14:40

## 2020-04-29 RX ADMIN — VANCOMYCIN HYDROCHLORIDE 1500 MG: 10 INJECTION, POWDER, LYOPHILIZED, FOR SOLUTION INTRAVENOUS at 11:08

## 2020-04-29 RX ADMIN — METOPROLOL TARTRATE 12.5 MG: 25 TABLET ORAL at 08:25

## 2020-04-29 RX ADMIN — PIPERACILLIN AND TAZOBACTAM 3.38 G: 3; .375 INJECTION, POWDER, FOR SOLUTION INTRAVENOUS at 22:53

## 2020-04-29 RX ADMIN — Medication 15 ML: at 08:38

## 2020-04-29 RX ADMIN — ASPIRIN 81 MG: 81 TABLET, COATED ORAL at 08:36

## 2020-04-29 RX ADMIN — HEPARIN SODIUM 16.9 UNITS/KG/HR: 10000 INJECTION, SOLUTION INTRAVENOUS at 04:36

## 2020-04-29 RX ADMIN — STANDARDIZED SENNA CONCENTRATE AND DOCUSATE SODIUM 2 TABLET: 8.6; 5 TABLET ORAL at 08:25

## 2020-04-29 RX ADMIN — PIPERACILLIN AND TAZOBACTAM 3.38 G: 3; .375 INJECTION, POWDER, FOR SOLUTION INTRAVENOUS at 06:51

## 2020-04-29 RX ADMIN — Medication 15 ML: at 21:38

## 2020-04-29 RX ADMIN — METOPROLOL TARTRATE 12.5 MG: 25 TABLET ORAL at 21:24

## 2020-04-29 ASSESSMENT — PAIN SCALES - WONG BAKER: WONGBAKER_NUMERICALRESPONSE: 2

## 2020-04-29 NOTE — PROGRESS NOTES
white blood cell count did increase with episodes of hypoxia requiring CPAP and 15 L of supplemental oxygen. This x-ray seem to suggest right lower lobe consolidation and there was concern for aspiration pneumonia and the patient was started on broad-spectrum antimicrobial therapy with Zosyn and vancomycin and cultures were sent. I was asked to evaluate as the patient did have some issues with clotting, the fevers, and leukocytosis and one other consideration was for novel coronavirus infection with the thrombotic events. Current evaluation:2020    BP (!) 109/47   Pulse 73   Temp 97.1 °F (36.2 °C) (Oral)   Resp 18   Ht 5' 10\" (1.778 m)   Wt (!) 320 lb (145.2 kg)   SpO2 98%   BMI 45.92 kg/m²     Temperature Range: Temp: 97.1 °F (36.2 °C) Temp  Av.1 °F (36.7 °C)  Min: 97.1 °F (36.2 °C)  Max: 99.1 °F (37.3 °C)  The patient is seen and evaluated at bedside he is awake and is nonverbal.  He remains weak in the right face, upper and lower extremity. He does not seem to be reporting any significant cough or shortness of breath. Review of Systems   Unable to perform ROS: Patient nonverbal       Physical Examination :     Physical Exam  Constitutional:       Appearance: He is well-developed. HENT:      Head: Normocephalic and atraumatic. Neck:      Musculoskeletal: Normal range of motion and neck supple. Cardiovascular:      Rate and Rhythm: Normal rate. Heart sounds: Normal heart sounds. No friction rub. No gallop. Pulmonary:      Effort: Pulmonary effort is normal.      Breath sounds: Normal breath sounds. No wheezing. Abdominal:      General: Bowel sounds are normal.      Palpations: Abdomen is soft. There is no mass. Tenderness: There is no abdominal tenderness. Musculoskeletal: Normal range of motion. Lymphadenopathy:      Cervical: No cervical adenopathy. Skin:     General: Skin is warm and dry.    Neurological:      Mental Status: He is alert and oriented to person,

## 2020-04-29 NOTE — PROGRESS NOTES
45 minutes of activity to improve strength and endurance  Patient Goals   Patient goals : Could not state    Plan    Plan  Times per week: 6x/week  Current Treatment Recommendations: Strengthening, Functional Mobility Training, Transfer Training, Gait Training, Safety Education & Training, Balance Training, Endurance Training  Safety Devices  Type of devices: Left in bed, Nurse notified, Call light within reach, All fall risk precautions in place, Gait belt  Restraints  Initially in place: No     Therapy Time   Individual Concurrent Group Co-treatment   Time In 0831         Time Out 0858         Minutes 27         Timed Code Treatment Minutes: [address]

## 2020-04-29 NOTE — PROGRESS NOTES
Neuro Critical Care Sign Out to Internal Medicine      Date and time: 4/29/2020 4:15 PM  Patient's name:  Cj Alvarez  Medical Record Number: [id number]  Patient's account/billing number: [de-identified]  Patient's YOB: 1950  Age: 78 y.o. Date of Admission: 4/25/2020  7:40 AM  Length of stay during current admission: 4    Primary Care Physician: No primary care provider on file. Code Status: Full Code    Mode of physician to physician communication:        [] Via telephone   [x] In person     Date and time of sign-out: 4/29/2020 4:15 PM    Accepting Team's Attending: Dr. Maximo Gomez    Patient's current ICU Bed:  882         [] Med-Surg Monitored [x] Step-down       [] Psychiatry ICU       [] Psych floor     Reason for ICU admission:     Left M2 occlusion s/p mechanical thrombectomy    ICU course summary:     The patient is a 77 yo male with history of atrial fibrillation (not on [facility]) who presented with aphasia, dysarthria, and left gaze. Patient woke up symptomatic, LKW 0200 the day of admission. Pt had elevated SBP >200 for EMS and was given Labetalol en route to ER. CT head completed in mobile stroke unit which was unremarkable, no hemorrhage. Not a tPA candidate because he was outside the window. CTA h/n showing occlusion of left proximal M2 at the level of the sylvian fissure. He was taken for emergent thrombectomy, TICI 2C.     When patient arrived to ICU, he quickly experienced bradycardia down to as low as 28 bpm. Pt actively vomiting and was suctioned aggressively. Pt received 0.5mg Atropine with resolution of the bradycardia, HR then into 60s. Hypertensive on Cleviprex. Empirically given 2mg Mg. Monitor showing AF.  Started on Heparin infusion post thrombectomy.     Hospital Course:   4/26: CXR showed mild vascular congestion.  Given 10mg IVP Lasix. Repeat EKG showed afib, no further heart block.  Lopressor 12.5mg BID started. 4/27: 1 episode of sustained VT this AM, asymptomatic.  EKG obtained after event showed rate controlled AF. Electrolytes, including Mg, wnl.  4/28: Febrile with leukocytosis, broad spectrum antibiotics started for likely aspiration pneumonia.      Last 24h:   No acute events overnight. Tube feeds at goal, tolerating well. Large BM overnight. Patient continues to have severe dysarthria and weak cough, will likely require PEG prior to discharge. Heparin infusion continued for atrial fibrillation, will need long term oral anticoagulation, to start ~ May 5th with CT head prior to starting. Procedures during patient's ICU stay:     Arterial line placelemt    Current Vitals:     BP (!) 109/47   Pulse 73   Temp 97.1 °F (36.2 °C) (Oral)   Resp 18   Ht 5' 10\" (1.778 m)   Wt (!) 320 lb (145.2 kg)   SpO2 98%   BMI 45.92 kg/m²       Cultures:     Blood cultures:                 [] None drawn      [x] Negative             []  Positive (Details:  )  Urine Culture:                   [] None drawn      [x] Negative             []  Positive (Details:  )  Sputum Culture:               [x] None drawn       [] Negative             []  Positive (Details:  )   Endotracheal aspirate:     [x] None drawn       [] Negative             []  Positive (Details:  )       Consults:     1. Neurology  2. Neuro endovascular  3. Infectious Disease    Assessment:     Patient Active Problem List    Diagnosis Date Noted    Arterial line in place     Cerebrovascular accident (CVA) ([facility] 04/25/2020    Acute ischemic left MCA stroke (HCC)     Bradycardia        Recommended Follow-up:     1. Dysphagia, frequent suctioning of oral secretions  2. May need PEG, failed barium swallow, continues to have weak cough  3. Oral anticoagulation to start May 5th with CT head prior to starting  4. Continue Vanc/Zosyn for aspiration pneumonia, ID following  5. PM&R following, follow up recs        Above mentioned assessment and plan was discussed by me with the admitting medicine resident.  The medicine team assigned to the

## 2020-04-29 NOTE — PROGRESS NOTES
ICU PATIENT TRANSFER NOTE        Patient:  Onur Course  YOB: 1950    MRN: [id number]     Acct: [de-identified]     Admit date: 4/25/2020    Code Status:-   Full code     Reason for ICU Admission:-   Acute left MCA ischemic stroke    SUPPORT DEVICES: [] Ventilator [] BIPAP  [x] Nasal Cannula [] Room Air    Consultations:- [] Cardiology [] Nephrology  [] Hemo onco  [] GI                               [x] ID [] ENT  [] Rheum [x]Neurology    NUTRITION:  [x] NPO [x] Tube Feeding [] TPN  [] PO    Central Lines:-   [x] No   [] Yes      Pt seen and Chart reviewed. ICU COURSE :-     Enio Justin is a 56-year-old male with PMH of A. fib not on [facility], stroke who was admitted to neuro ICU on 4/25/2020 for acute left MCA ischemic stroke, presented initially with complaints of aphasia, dysarthria, right-sided weakness and left-sided gaze. CT head was unremarkable. CTA head and neck showed occlusion of left proximal M2. MRI brain without contrast showed acute infarct in the left insular cortex and left frontotemporal region along with left parieto-occipital region. Patient was started on heparin drip. He was out of TPA window. Underwent emergent mechanical thrombectomy, TICI 2C. He had one episode of bradycardia with HR as low as 28, received 1 dose of 0.5 mg atropine which improved the heart rate. His blood pressure was labile, initially hypertensive on clevidipine drip and later developed hypotension requiring transient pressor support with Levophed. 1 episode of sustained VT, asymptomatic. EKG obtained showed controlled heart rate A. fib. Patient was febrile with T-max of 99.2 °F yesterday, leukocytosis with WBC peaking at 24.6, chest x-ray with concern for RLL aspiration pneumonia. Started on IV vancomycin and Zosyn and consulted ID. 2D echo showed normal EF, no DD, negative bubble study.       Physical Exam:  Vitals: BP (!) 109/47   Pulse 73   Temp 97.1 °F (36.2 °C) (Oral)   Resp 18   Ht 5' 10\" (1.778 m)   Wt (!) 320 lb (145.2 kg)   SpO2 98%   BMI 45.92 kg/m²   24 hour intake/output:    Intake/Output Summary (Last 24 hours) at 4/29/2020 1543  Last data filed at 4/29/2020 0400  Gross per 24 hour   Intake 1578.33 ml   Output 0 ml   Net 1578.33 ml     Last 3 weights: Wt Readings from Last 3 Encounters:   04/25/20 (!) 320 lb (145.2 kg)       General appearance: Comfortably sleeping. Arousable on verbal commands. Oriented x2. Appropriately answers questions. Follows commands. HEENT: Atraumatic. Normocephalic. No conjunctival injections. EOMI. Neck: No adenopathy, no JVD, supple symmetrical trachea midline. Lungs: Bilateral air entry present. Rhonchi present in right lower lobe. No wheezing. Heart: S1-S2 heard, no added sounds. Abdomen: soft, non-tender; bowel sounds normal; no masses,  no organomegaly  Extremities: Bilateral lower extremity stasis dermatitis present. Severe lichen chronicus present extending up to below knee. Neurologic: Right-sided weakness present. Slurred speech, dysarthria.     Medications:Current Inpatient  Scheduled Meds:   piperacillin-tazobactam  3.375 g Intravenous Q8H    vancomycin  1,500 mg Intravenous Q12H    vancomycin (VANCOCIN) intermittent dosing (placeholder)   Other RX Placeholder    [Held by provider] lisinopril  10 mg Oral Daily    atorvastatin  40 mg Oral Nightly    metoprolol tartrate  12.5 mg Oral BID    sennosides-docusate sodium  2 tablet Oral Daily    chlorhexidine  15 mL Mouth/Throat BID    sodium chloride flush  10 mL Intravenous 2 times per day    aspirin  81 mg Oral Daily    Or    aspirin  300 mg Rectal Daily     Continuous Infusions:   dextrose      heparin (porcine) 18.9 Units/kg/hr (04/29/20 1224)     PRN Meds:albuterol, mineral oil-hydrophilic petrolatum, oxyCODONE-acetaminophen, atropine, sodium chloride flush, acetaminophen **OR** acetaminophen, promethazine **OR** ondansetron, senna,

## 2020-04-29 NOTE — PROGRESS NOTES
Daily Progress Note  Neuro Critical Care    Patient Name: Yana Danny  Patient : 1950  Room/Bed: 9079/1108-98  Code Status: Full  Allergies: No Known Allergies    CHIEF COMPLAINT:      Aphasia     INTERVAL HISTORY    Initial Presentation (Admitted 20): The patient is a 77 yo male with history of [facility] ([facility]) who presented with aphasia, dysarthria, and left gaze. Patient woke up symptomatic, LKW 0200 the day of admission. Pt had elevated SBP >200 for EMS and was given Labetalol en route to ER. CT head completed in mobile stroke unit which was unremarkable, no hemorrhage. Not a tPA candidate because he was outside the window. CTA h/n showing occlusion of left proximal M2 at the level of the sylvian fissure. He was taken for emergent thrombectomy, TICI 2C. When patient arrived to ICU, he quickly experienced bradycardia down to as low as 28 bpm. Pt actively vomiting and was suctioned aggressively. Pt received 0.5mg Atropine with resolution of the bradycardia, HR then into 60s. Hypertensive on Cleviprex. Empirically given 2mg Mg. Monitor showing AF. Started on Heparin infusion post thrombectomy. Hospital Course:   : CXR showed mild vascular congestion. Given 10mg IVP Lasix. Repeat EKG showed afib, no further heart block. Lopressor 12.5mg BID started. : 1 episode of sustained VT this AM, asymptomatic. EKG obtained after event showed rate controlled AF. Electrolytes, including Mg, wnl.  : Febrile with leukocytosis, broad spectrum antibiotics started for likely aspiration pneumonia. Last 24h:   No acute events overnight. Tube feeds at goal, tolerating well. Large BM overnight. Patient continues to have severe dysarthria and weak cough, will likely require PEG prior to discharge. Heparin infusion continued for atrial fibrillation, will need long term oral anticoagulation, to start ~ May 5th with CT head prior to starting.      CURRENT MEDICATIONS:  SCHEDULED from the following:    Height as of this encounter: 5' 10\" (1.778 m). Weight as of this encounter: 320 lb (145.2 kg).  []<16 Severe malnutrition  []16-16.99 Moderate malnutrition  []17-18.49 Mild malnutrition  []18.5-24.9 Normal  []25-29.9 Overweight (not obese)  []30-34.9 Obese class 1 (Low Risk)  []35-39.9 Obese class 2 (Moderate Risk)  [x]? 40 Obese class 3 (High Risk)    RECENT LABS:   Lab Results   Component Value Date    WBC 13.9 (H) 04/29/2020    HGB 12.8 (L) 04/29/2020    HCT 38.9 (L) 04/29/2020     04/29/2020    CHOL 111 04/25/2020    TRIG 80 04/25/2020    HDL 46 04/25/2020     04/29/2020    K 3.7 04/29/2020     04/29/2020    CREATININE 1.26 (H) 04/29/2020    BUN 35 (H) 04/29/2020    CO2 22 04/29/2020    TSH 2.38 04/26/2020    INR 1.1 04/25/2020    LABA1C 5.6 04/25/2020     24 HOUR INTAKE/OUTPUT:    Intake/Output Summary (Last 24 hours) at 4/29/2020 1443  Last data filed at 4/29/2020 0400  Gross per 24 hour   Intake 1578.33 ml   Output 0 ml   Net 1578.33 ml       IMAGING:   XR CHEST PORTABLE   Final Result   Overall, no significant change in chest findings given differences and   inspiration. No focal lung consolidation is seen to suggest pneumonia. CT HEAD WO CONTRAST   Final Result   Left middle cerebral artery infarct with scattered subarachnoid hyperdense   material representing hemorrhage versus contrast staining and this is not   significantly changed compared to prior. FL MODIFIED BARIUM SWALLOW W VIDEO   Final Result   Aspiration was seen with the pureed consistency. Penetration was seen with   the honey consistency. Please see separate speech pathology report for full discussion of findings   and recommendations.          CT HEAD WO CONTRAST   Final Result   Progression of the left middle cerebral artery infarct with scattered   subarachnoid hyperdense material representing mild hemorrhage versus contrast   staining and attention on short-term follow-up is recommended. XR CHEST PORTABLE   Final Result   Again identified is cardiomegaly with vascular congestion, though minimally   improved from the previous examination. No focal consolidation. XR CHEST PORTABLE   Final Result   Similar vascular congestion/edema. MRI BRAIN WO CONTRAST   Final Result   Areas of acute infarct in the left insular cortex and left frontotemporal   region and a smaller area of acute infarct in the left parieto-occipital   region consistent with left middle cerebral artery territory infarcts. Mild cerebral atrophy. Chronic small vessel ischemic changes. XR ABDOMEN FOR NG/OG/NE TUBE PLACEMENT   Final Result   Enteric tube terminates in the expected location of the stomach with the side   port is near the GE junction. Consider advancing about 5 cm for better   position. Cardiomegaly and pulmonary vascular congestion may be magnified in part by   shallow inspiration but an element of volume overload is considered. Probable splenomegaly. XR CHEST PORTABLE   Final Result   Enteric tube terminates in the expected location of the stomach with the side   port is near the GE junction. Consider advancing about 5 cm for better   position. Cardiomegaly and pulmonary vascular congestion may be magnified in part by   shallow inspiration but an element of volume overload is considered. Probable splenomegaly. VL DUP LOWER EXTREMITY ARTERIES RIGHT   Final Result      IR ANGIOGRAM CAROTID C EREBRAL BILATERAL         XR CHEST PORTABLE   Final Result   1. Cardiomegaly. XR ABDOMEN (KUB) (SINGLE AP VIEW)   Final Result   1. No acute abnormality. CTA HEAD NECK W CONTRAST   Final Result   1. Occlusion involving a proximal left M2 branch at the level of the left   sylvian fissure. There appears to be delayed enhancement of the distal   branches.    2. There is question of blurring of the gray-white differentiation involving the left insular cortex as well as the left frontal lobe near the vertex. 3. No focal stenosis otherwise seen of the [id number]. 4. No flow limiting stenosis of the cervical carotid/vertebral arteries. Findings were communicated to Dr. Sunil Klein on 4/25/2020 at 8:36 am.         CT HEAD WO CONTRAST   Final Result   1. Motion partially limits evaluation. 2. No convincing acute intracranial abnormality. 3. There is question of a small chronic infarct involving the left parietal   lobe. Findings were acute acute to Dr. Sunil Klein on 4/25/2020 at 7:27 am.               Labs and Images reviewed with:  [] Dr. Brenda Leroy    [x] Dr. Nahum Proctor  [] Dr. Jeff Holt  [] There are no new interval images to review. PHYSICAL EXAM       CONSTITUTIONAL:  Awake, Alert and oriented to person and place, sometimes year. Severe dysarthria. Global aphasia. HEAD:  normocephalic, atraumatic    EYES:  PERRL, left gaze preference, does cross midline   ENT:  moist mucous membranes   NECK:  supple, symmetric   LUNGS:  Equal air entry bilaterally   CARDIOVASCULAR:  normal s1 / s2, afib. Bilateral lower extremity edema, vascular discoloration, palpable DP pulses bilaterally   ABDOMEN:  Soft, no rigidity   NEUROLOGIC:  Mental Status: Aphasia, oriented to person and place. Mimics, does not follow verbal commands             Cranial Nerves:    II: Visual fields:  abnormal - right hemianopsia  III: Pupils:  equal, round, reactive to light  III,IV,VI: Extra Ocular Movements: abnormal left gaze preference, does cross midline  VII: Facial strength: abnormal right facial droop    Motor Exam:    Drift:  present - right upper and right lower  Tone:  abnormal - decreased right upper extremity    Motor exam is 5 out of 5 left upper and left lower extremities with the exception of 0/5 right upper extremity and 4-/5 right lower.     Sensory:    Touch:    Right Upper Extremity:  abnormal - decreased  Left Upper Extremity: mechanical thrombectomy, TICI 2C.     NEUROLOGIC:  - Left MCA territory infarction in setting of left M2 MCA occlusion  - Etiology likely cardioembolic in setting of afib  - POD #4 s/p mechanical thrombectomy left M2, TICI 2C  - Asprin 81mg QD, Lipitor 40mg QHS x2weeks then lower dose  - Continue Low dose heparin infusion  - CT Head 4/27 therapeutic on Heparin - progression of the L MCA infarct with scattered subarachnoid hyperdense material representing mild hemorrhage versus hemorrhage versus contrast staining.  Stable on repeat CT Head 4/28  - Will need stability CT head prior to starting oral anticoagulation  - Goal -170, slowly normalize BP  - Neuro checks per protocol     CARDIOVASCULAR:  - Goal -170, slowly normalize BP  - PRN Hydralazine/Labetalol  - Lisinopril 10mg QD, on hold due to WILLIAM  - Severe, symptomatic bradycardia x 1 post op resolved with Atropine x 1  - EKG afib right BBB, left anterior fascicular block  - Repeat EKG today afib, right BBB no longer present  - Chronic atrial fibrillation, rate controlled  - Lopressor 12.5mg BID, hold HR<60  - On Heparin infusion, will need oral AC for secondary stroke prevention, plan to start 5/5  - Echo: EF 63%, mild to moderate left ventricular hypertrophy, left atrium moderately dilated, right atrium mildly dilated  - Lipid panel; LDL 49, Cholesterol 111  - Lipitor 40mg QHS x2 weeks then decrease dose  - Continue telemetry     PULMONARY:  - Maintaining oxygen saturations on 5L NC  - Continue to monitor closely  - Frequent gentle suctioning     RENAL/FLUID/ELECTROLYTE:  - Acute renal injury, improving  - BUN 35/ Creatinine 1.26  - Adequate urine output per nursing  - Stop maintenance IVF  - Replace electrolytes PRN  - Daily BMP     GI/NUTRITION:  NUTRITION:  DIET TUBE FEED CONTINUOUS/CYCLIC NPO; STANDARD WITHOUT FIBER; Nasogastric; 25; 75   - Failed Barium swallow study, TF via NG  - consider surgery consult for PEG  - Bowel regimen: Senokot-S daily  - GI prophylaxis: Not indicated     ID:  - Tmax 37.6  - Leukocytosis improving, WBC 13.9  - blood cultures negative x 17 hours, urine culture negative  - Vancomycin and Zosyn for empiric coverage  - ID following  - Daily CBC  - wound eval for BLE - scrub with aquaphor     HEME:   - H&H 12.8/38.9  - Platelets 640  - Daily CBC     ENDOCRINE:  - Continue to monitor blood glucose, goal <180  - Hemoglobin A1C 5.6  - TSH 2.38/T3 2.27     OTHER:  - PT/OT/ST  - Code Status: FULL     PROPHYLAXIS:  Stress ulcer: N/A     DVT PROPHYLAXIS:  - SCD sleeves - Thigh High   - On heparin infusion    DISPOSITION:  [x] OK for out of ICU from Neuro Critical Care standpoint - sign out to medicine    For any changes in exam or patient status please contact Neuro Critical Care.       Shelvia Severe, APRN - CNP  Neuro Critical Care  Pager [phone]  4/29/2020     2:43 PM

## 2020-04-29 NOTE — PROGRESS NOTES
Speech Language Pathology  Speech Language Pathology  [facility]    Speech Language Treatment Note    Date: 4/29/2020  Patients Name: Zena Garvey  MRN: [id number]  Diagnosis:   Patient Active Problem List   Diagnosis Code    Cerebrovascular accident (CVA) ([address] I63.9    Acute ischemic left MCA stroke ([facility] I63.512    Bradycardia R00.1    Arterial line in place Z95.828       Pain: 0/10    Speech and Language Treatment  Treatment time: 941-1005    Subjective: [x] Alert [x] Cooperative     [] Confused     [] Agitated    [] Lethargic      Objective/Assessment:  Auditory Comprehension: 2 step commands 1/6 with max cues, 1- step commands 4/5 with max cues. Simple yes/no 5/5 independently, complex yes/no 6/8 with repetition. Verbal Expression: Nursery rhymes 0/6. Pt. Was able to get the tune of the rhyme and verbalize /a/ X3, however was not able to verbalize sounds of the rhyme. Automatics:  Pt. Was able to verbalize \"one\" with perseveration on \"one\" with max cues. Pt. Verbalized /a/, /o/ and /ee/ with mod cues. Repetition 0/5 with max cues. Unable to verbalize bio. Info with max cues. Other:  Pt. Seen for trial O/M treatment program for dysphagia. Pt. Completed was able to protrude tongue X2 with max cues. Pt. Was able to verbalize /ee/ X10 with mod cues, unable to complete resistance and retraction exercises, karan, lateralize with max cues. Education provided re: exercises and dysphagia. Pt. With probable severe oral and verbal apraxia. Plan:  [x] Continue  services    [] Discharge from :        Discharge recommendations: [] Inpatient Rehab   [] [place]   [] Outpatient Therapy  [] Follow up at trauma clinic   [] Other: Further therapy recommended at discharge. Treatment completed by:  Ciro Kevin M.S. [address] [facility]

## 2020-04-29 NOTE — CONSULTS
and HgbA1c is 5.6. Failed attempt at modified barium swallow for swallow eval. Possible repeated study vs PEG and need family discussion about it. Due to some hemorrhage after thrombectomy seen on imaging as per NICU will need about 10 days from stroke (4/25/2020-5/5/2020) to assess with head CT and then start PO Eliquis. Head CT will be on May 5, 2020. Past Medical History:        Diagnosis Date    Atrial fibrillation ([facility]     Stroke [facility])      Past Surgical History:    History reviewed. No pertinent surgical history.   Current Medications:   Current Facility-Administered Medications: albuterol (PROVENTIL) nebulizer solution 2.5 mg, 2.5 mg, Nebulization, As Directed RT PRN  piperacillin-tazobactam (ZOSYN) 3.375 g in dextrose 5 % 50 mL IVPB extended infusion (mini-bag), 3.375 g, Intravenous, Q8H  mineral oil-hydrophilic petrolatum (AQUAPHOR) ointment, , Topical, BID PRN  [Held by provider] lisinopril (PRINIVIL;ZESTRIL) tablet 10 mg, 10 mg, Oral, Daily  atorvastatin (LIPITOR) tablet 40 mg, 40 mg, Oral, Nightly  metoprolol tartrate (LOPRESSOR) tablet 12.5 mg, 12.5 mg, Oral, BID  sennosides-docusate sodium (SENOKOT-S) 8.6-50 MG tablet 2 tablet, 2 tablet, Oral, Daily  chlorhexidine (PERIDEX) 0.12 % solution 15 mL, 15 mL, Mouth/Throat, BID  oxyCODONE-acetaminophen (PERCOCET) 5-325 MG per tablet 1 tablet, 1 tablet, Oral, Q4H PRN  atropine injection 0.5 mg, 0.5 mg, Intravenous, PRN  sodium chloride flush 0.9 % injection 10 mL, 10 mL, Intravenous, 2 times per day  sodium chloride flush 0.9 % injection 10 mL, 10 mL, Intravenous, PRN  acetaminophen (TYLENOL) tablet 650 mg, 650 mg, Oral, Q6H PRN **OR** acetaminophen (TYLENOL) suppository 650 mg, 650 mg, Rectal, Q6H PRN  promethazine (PHENERGAN) tablet 12.5 mg, 12.5 mg, Oral, Q6H PRN **OR** ondansetron (ZOFRAN) injection 4 mg, 4 mg, Intravenous, Q6H PRN  senna (SENOKOT) tablet 8.6 mg, 1 tablet, Oral, Daily PRN  glucose (GLUTOSE) 40 % oral gel 15 g, 15 g, Oral, Significant for good strength of grade 5/5 in proximal muscle groups on abduction, adduction, flexion, extension, internal and external rotation and grade 5/5 on distal muscle groups on extension and flexion    Drift: Present on right lower extremity   SENSORY FUNCTION:  RUE: Decreased sensation to light touch, pinprick, temperature. Patient still with present vibration and proprioception     LUE: Normal sensation to light touch, temperature, pin prick, vibration, proprioception(Joint position sense)     RLE: Normal sensation to light touch. To pinprick and temperature patient refer is decreased. Patient unable to feel vibration in knee down. Unable to have proprioception (Joint position sense)     LLE:Normal sensation to light touch, temperature, pin prick. Vibration and proprioception (Joint position sense) is decreased   CEREBELLAR FUNCTION:  No apparent dysmetria on left upper and left lower extremity. No movement in right upper extremity and is weak 3 out of 5 on right lower extremity   REFLEX FUNCTION:  Right Bicep (C5, C6): 2/2                                         Left Bicep (C5, C6): 2/2   Right Brachiocephalic (C6): 2/2                               Left Brachiocephalic (C6): 2/2     Right Patella (L4): 2/2                                              Left Patella (L4): 2/2   Right Achilles (S1): 2/2                                            Left Achilles (S1): 2/2      Nolan Sign: None   Plantar (Babinski) Response: downward     Negative Kerning & Brudzinski   STATION and GAIT  deferred                    Fluent? Comprehends? Repetition?  Type of Aphasia   [] Yes  [x] No [x] Yes  [] No [] Yes  [x] No [] Global Aphasia  [x] Broca's Aphasia  [] Wernicke's  [] Conduction  [] Mixed Transcortical Aphasia  [] Transcortical Motor Aphasia  [] Transcortical Sensory Aphasia  [] Anomic Aphasia     Additional Examination Elements and Findings:     LUNGS: Bilateral scattered rhonchi, secretions  CARDIOVASCULAR: Atrial fibrillation, S1 and S2 noticed    DATA  CBC     4/30/2020 04:27   WBC 13.8 (H)   RBC 4.36   Hemoglobin Quant 12.7 (L)   Hematocrit 39.0 (L)   RDW 14.8 (H)   Platelet Count 906     BMP   4/30/2020 04:27   Sodium 137   Potassium 3.6 (L)   Chloride 101   CO2 23   BUN 27 (H)   Creatinine 1.01   Magnesium 2.3   Glucose 189 (H)   Calcium 8.3 (L)     Lipid Profile   4/25/2020 14:40   LDL Cholesterol 49     HgbA1c   4/25/2020 14:40   Hemoglobin A1C 5.6     IMAGING    · Head CT WO (4/25/2020)  Impression   1. Motion partially limits evaluation. 2. No convincing acute intracranial abnormality. 3. There is question of a small chronic infarct involving the left parietal   lobe. Findings were acute acute to Dr. Yovana Santiago on 4/25/2020 at 7:27 am.         · CTA H/N (4/25/2020)  Impression   1. Occlusion involving a proximal left M2 branch at the level of the left   sylvian fissure.  There appears to be delayed enhancement of the distal   branches. 2. There is question of blurring of the gray-white differentiation involving   the left insular cortex as well as the left frontal lobe near the vertex. 3. No focal stenosis otherwise seen of the mrfifz-ay-Gmhtfa. 4. No flow limiting stenosis of the cervical carotid/vertebral arteries. Findings were communicated to Dr. Yovana Santiago on 4/25/2020 at 8:36 am.         · Angiogram (4/25/2020)  Impression:   1. Left MCA M1 proximal clot resulting in acute ischemic stroke due to cardioembolic event treated with 3 passes of mechanical thrombectomy using Solitaire 4 x 40 mm and a large bore reperfusion catheter resulted in tici 2c with persistent distal end at    C6 occlusion that is not amenable to mechanical thrombectomy. · Right Lower Extremity Arterial Doppler (4/25/2020)  Summary        No evidence of significant occlusive arterial disease in the right lower    extremity.  Technically limited visualization in the right groin due to    fem-stop conjunction with neuro critical care and endovascular neurosurgery it was decided to wait 10 days from the stroke (4/25/2020-5/5/2020) to have a repeated scan to evaluate if patient can go into p.o. Eliquis. His LDL is 49 currently on atorvastatin 40. A1c is 5.6. Currently with PT, OT, speech. Patient with dysphagia but will have repeated partial versus back discussion. Patient will need more therapy after discharge. // Afib //   Patient still show an telemetry atrial fibrillation but rate controlled. Patient currently on metoprolol and will need anticoagulation with Eliquis. Advised to May 5, 2020 for reevaluation of head CT showing subarachnoid hemorrhage to assess the safety to start Eliquis. Currently on heparin. Cardiology following    // Infection //   Patient with bouts of emesis and then having fevers, leukocytosis and suspicion of aspiration pneumonia. Patient did well with Zosyn. Will defer management of infection by IM    // Dysphagia //   Patient went on failed barium swallow with video on 4/27/2020 which showed aspiration with purée. As per speech note (4/27/2020) from test they recommended repeat modified barium swallow study after O/M exercise program for dysphagia and is implemented in about 3 to 5 days. Need evaluation with possible repeat barium swallow or discussion with family if any PEG needed so patient can have nutrition and then reverse it. RECOMMENDATIONS:   1. IM as primary - Infection, a-fib, dysphagia  · Zosyn 3.375g IV Q8 for suspected aspiration pneumonia  · Metoprolol Tartrate 12.5mg PO BID for rate control  2. I recommend to continue ASA 81mg PO Daily  3. I recommend to continue Atorvastatin 40mg PO nightly  4. I recommend to continue PT, OT, Speech  5. I recommend to continue Heparin and on May 5, 2020 (10 days after stroke) repeat Head CT WO and assess for bleeding and see If patient can start Eliquis PO for his atrial fibrillation  6.  Blood pressure at moment

## 2020-04-30 PROBLEM — N17.9 AKI (ACUTE KIDNEY INJURY) (HCC): Status: ACTIVE | Noted: 2020-04-30

## 2020-04-30 PROBLEM — I95.9 HYPOTENSION: Status: ACTIVE | Noted: 2020-04-30

## 2020-04-30 PROBLEM — I48.91 ATRIAL FIBRILLATION (HCC): Status: ACTIVE | Noted: 2020-04-30

## 2020-04-30 PROBLEM — R13.10 DYSPHAGIA: Status: ACTIVE | Noted: 2020-04-30

## 2020-04-30 PROBLEM — I10 ESSENTIAL HYPERTENSION: Status: ACTIVE | Noted: 2020-04-30

## 2020-04-30 PROBLEM — J69.0 ASPIRATION PNEUMONIA (HCC): Status: ACTIVE | Noted: 2020-04-30

## 2020-04-30 PROBLEM — I89.0 CHRONIC ACQUIRED LYMPHEDEMA: Status: ACTIVE | Noted: 2020-04-30

## 2020-04-30 PROBLEM — D72.829 LEUKOCYTOSIS: Status: ACTIVE | Noted: 2020-04-30

## 2020-04-30 LAB
ABSOLUTE EOS #: 0.48 K/UL (ref 0–0.44)
ABSOLUTE IMMATURE GRANULOCYTE: 0.07 K/UL (ref 0–0.3)
ABSOLUTE LYMPH #: 0.88 K/UL (ref 1.1–3.7)
ABSOLUTE MONO #: 1.02 K/UL (ref 0.1–1.2)
ANION GAP SERPL CALCULATED.3IONS-SCNC: 13 MMOL/L (ref 9–17)
BASOPHILS # BLD: 1 % (ref 0–2)
BASOPHILS ABSOLUTE: 0.09 K/UL (ref 0–0.2)
BUN BLDV-MCNC: 27 MG/DL (ref 8–23)
BUN/CREAT BLD: ABNORMAL (ref 9–20)
CALCIUM SERPL-MCNC: 8.3 MG/DL (ref 8.6–10.4)
CHLORIDE BLD-SCNC: 101 MMOL/L (ref 98–107)
CO2: 23 MMOL/L (ref 20–31)
CREAT SERPL-MCNC: 1.01 MG/DL (ref 0.7–1.2)
DIFFERENTIAL TYPE: ABNORMAL
EOSINOPHILS RELATIVE PERCENT: 4 % (ref 1–4)
GFR AFRICAN AMERICAN: >60 ML/MIN
GFR NON-AFRICAN AMERICAN: >60 ML/MIN
GFR SERPL CREATININE-BSD FRML MDRD: ABNORMAL ML/MIN/{1.73_M2}
GFR SERPL CREATININE-BSD FRML MDRD: ABNORMAL ML/MIN/{1.73_M2}
GLUCOSE BLD-MCNC: 153 MG/DL (ref 75–110)
GLUCOSE BLD-MCNC: 189 MG/DL (ref 70–99)
HCT VFR BLD CALC: 39 % (ref 40.7–50.3)
HEMOGLOBIN: 12.7 G/DL (ref 13–17)
IMMATURE GRANULOCYTES: 1 %
LYMPHOCYTES # BLD: 6 % (ref 24–43)
MAGNESIUM: 2.3 MG/DL (ref 1.6–2.6)
MCH RBC QN AUTO: 29.1 PG (ref 25.2–33.5)
MCHC RBC AUTO-ENTMCNC: 32.6 G/DL (ref 28.4–34.8)
MCV RBC AUTO: 89.4 FL (ref 82.6–102.9)
MONOCYTES # BLD: 7 % (ref 3–12)
NRBC AUTOMATED: 0 PER 100 WBC
PARTIAL THROMBOPLASTIN TIME: 51.9 SEC (ref 20.5–30.5)
PARTIAL THROMBOPLASTIN TIME: 54.8 SEC (ref 20.5–30.5)
PDW BLD-RTO: 14.8 % (ref 11.8–14.4)
PLATELET # BLD: 197 K/UL (ref 138–453)
PLATELET ESTIMATE: ABNORMAL
PMV BLD AUTO: 12.2 FL (ref 8.1–13.5)
POTASSIUM SERPL-SCNC: 3.6 MMOL/L (ref 3.7–5.3)
RBC # BLD: 4.36 M/UL (ref 4.21–5.77)
RBC # BLD: ABNORMAL 10*6/UL
SEG NEUTROPHILS: 81 % (ref 36–65)
SEGMENTED NEUTROPHILS ABSOLUTE COUNT: 11.26 K/UL (ref 1.5–8.1)
SODIUM BLD-SCNC: 137 MMOL/L (ref 135–144)
WBC # BLD: 13.8 K/UL (ref 3.5–11.3)
WBC # BLD: ABNORMAL 10*3/UL

## 2020-04-30 PROCEDURE — 6360000002 HC RX W HCPCS: Performed by: STUDENT IN AN ORGANIZED HEALTH CARE EDUCATION/TRAINING PROGRAM

## 2020-04-30 PROCEDURE — 6370000000 HC RX 637 (ALT 250 FOR IP): Performed by: STUDENT IN AN ORGANIZED HEALTH CARE EDUCATION/TRAINING PROGRAM

## 2020-04-30 PROCEDURE — 92507 TX SP LANG VOICE COMM INDIV: CPT

## 2020-04-30 PROCEDURE — 2060000000 HC ICU INTERMEDIATE R&B

## 2020-04-30 PROCEDURE — 83735 ASSAY OF MAGNESIUM: CPT

## 2020-04-30 PROCEDURE — 36415 COLL VENOUS BLD VENIPUNCTURE: CPT

## 2020-04-30 PROCEDURE — 99233 SBSQ HOSP IP/OBS HIGH 50: CPT | Performed by: INTERNAL MEDICINE

## 2020-04-30 PROCEDURE — 99212 OFFICE O/P EST SF 10 MIN: CPT

## 2020-04-30 PROCEDURE — 99232 SBSQ HOSP IP/OBS MODERATE 35: CPT | Performed by: INTERNAL MEDICINE

## 2020-04-30 PROCEDURE — 97530 THERAPEUTIC ACTIVITIES: CPT

## 2020-04-30 PROCEDURE — 6370000000 HC RX 637 (ALT 250 FOR IP): Performed by: NURSE PRACTITIONER

## 2020-04-30 PROCEDURE — 99222 1ST HOSP IP/OBS MODERATE 55: CPT | Performed by: SURGERY

## 2020-04-30 PROCEDURE — U0002 COVID-19 LAB TEST NON-CDC: HCPCS

## 2020-04-30 PROCEDURE — 85025 COMPLETE CBC W/AUTO DIFF WBC: CPT

## 2020-04-30 PROCEDURE — 2580000003 HC RX 258: Performed by: STUDENT IN AN ORGANIZED HEALTH CARE EDUCATION/TRAINING PROGRAM

## 2020-04-30 PROCEDURE — 31720 CLEARANCE OF AIRWAYS: CPT

## 2020-04-30 PROCEDURE — 94760 N-INVAS EAR/PLS OXIMETRY 1: CPT

## 2020-04-30 PROCEDURE — 6360000002 HC RX W HCPCS: Performed by: INTERNAL MEDICINE

## 2020-04-30 PROCEDURE — 85730 THROMBOPLASTIN TIME PARTIAL: CPT

## 2020-04-30 PROCEDURE — 82947 ASSAY GLUCOSE BLOOD QUANT: CPT

## 2020-04-30 PROCEDURE — 80048 BASIC METABOLIC PNL TOTAL CA: CPT

## 2020-04-30 PROCEDURE — 97535 SELF CARE MNGMENT TRAINING: CPT

## 2020-04-30 PROCEDURE — 97110 THERAPEUTIC EXERCISES: CPT

## 2020-04-30 PROCEDURE — 2580000003 HC RX 258: Performed by: INTERNAL MEDICINE

## 2020-04-30 PROCEDURE — 99222 1ST HOSP IP/OBS MODERATE 55: CPT | Performed by: PSYCHIATRY & NEUROLOGY

## 2020-04-30 RX ADMIN — HEPARIN SODIUM 20.9 UNITS/KG/HR: 10000 INJECTION, SOLUTION INTRAVENOUS at 19:38

## 2020-04-30 RX ADMIN — PIPERACILLIN AND TAZOBACTAM 3.38 G: 3; .375 INJECTION, POWDER, FOR SOLUTION INTRAVENOUS at 14:34

## 2020-04-30 RX ADMIN — PIPERACILLIN AND TAZOBACTAM 3.38 G: 3; .375 INJECTION, POWDER, FOR SOLUTION INTRAVENOUS at 21:18

## 2020-04-30 RX ADMIN — ASPIRIN 81 MG: 81 TABLET, COATED ORAL at 08:14

## 2020-04-30 RX ADMIN — HEPARIN SODIUM 20.9 UNITS/KG/HR: 10000 INJECTION, SOLUTION INTRAVENOUS at 10:32

## 2020-04-30 RX ADMIN — Medication 15 ML: at 08:20

## 2020-04-30 RX ADMIN — HEPARIN SODIUM 20.9 UNITS/KG/HR: 10000 INJECTION, SOLUTION INTRAVENOUS at 02:00

## 2020-04-30 RX ADMIN — SODIUM CHLORIDE, PRESERVATIVE FREE 10 ML: 5 INJECTION INTRAVENOUS at 08:15

## 2020-04-30 RX ADMIN — WHITE PETROLATUM: 1.75 OINTMENT TOPICAL at 08:15

## 2020-04-30 RX ADMIN — Medication 15 ML: at 21:19

## 2020-04-30 RX ADMIN — PIPERACILLIN AND TAZOBACTAM 3.38 G: 3; .375 INJECTION, POWDER, FOR SOLUTION INTRAVENOUS at 06:01

## 2020-04-30 RX ADMIN — METOPROLOL TARTRATE 12.5 MG: 25 TABLET ORAL at 08:15

## 2020-04-30 RX ADMIN — DESMOPRESSIN ACETATE 40 MG: 0.2 TABLET ORAL at 21:27

## 2020-04-30 ASSESSMENT — PAIN SCALES - WONG BAKER
WONGBAKER_NUMERICALRESPONSE: 0

## 2020-04-30 ASSESSMENT — PAIN SCALES - GENERAL
PAINLEVEL_OUTOF10: 0

## 2020-04-30 NOTE — PROGRESS NOTES
Physical Therapy  Facility/Department: [address]  Daily Treatment Note  NAME: Latosha Hugo  : 1950  MRN: [id number]    Date of Service: 2020    Discharge Recommendations:  Further therapy recommended at discharge and the patient should be able to tolerate at least 3 hours per day over 5 days or 15 hours over 7 days. PT Equipment Recommendations  Equipment Needed: No    Assessment     Body structures, Functions, Activity limitations: Decreased functional mobility ; Decreased strength;Decreased endurance;Decreased sensation;Decreased balance  Assessment: Pt required Mod A +2 for bed mobility and to stand with Alyssa Chill. Pt demonstrates willing to participate and motivation to improve,  will require continued intensive PT after DC to return to prior level of function. Prognosis: Good  PT Education: Transfer Training;General Safety; Functional Mobility Training;PT Role  Barriers to Learning: Aphasia  Activity Tolerance  Activity Tolerance: Patient limited by fatigue;Patient limited by endurance         Patient Diagnosis(es): The encounter diagnosis was Cerebrovascular accident (CVA), unspecified mechanism ([facility] has a past medical history of Arterial ischemic stroke, MCA (middle cerebral artery), left, acute ([facility] Atrial fibrillation ([facility] and Stroke ([facility] has no past surgical history on file. Restrictions  Restrictions/Precautions  Restrictions/Precautions: Fall Risk, Up as Tolerated  Required Braces or Orthoses?: No  Subjective   Pt laying in bed. Pt getting cleaned up with O.T. and his RN. Pt is motivated to get better. Pt has no c/o pain. Orientation    Overall Orientation Status: (unable to fully assess secondary to expressive aphasia, follows all commands)      Objective     Bed mobility  Rolling to Right: Minimal assistance  Supine to Sit: Moderate assistance;2 Person assistance  Scooting:  Moderate assistance;2 Person assistance  Comment: vc's for sequencing and progression, pt

## 2020-04-30 NOTE — CONSULTS
General Surgery:  Consult Note        PATIENT NAME: Paula Chavez   YOB: 1950    ADMISSION DATE: 4/25/2020  7:40 AM     Admitting Provider: Dr. Mary Kate Gallegos Physician:         Rock Webb DATE: 4/30/2020    Consult Regarding:  PEG tube placement     HISTORY OF PRESENT ILLNESS:  The patient is a 78 y.o. male  who was admitted to neuro ICU on 4/25 post emergency thrombectomy of left MCA for acute ischemic stroke. Patient has significant PMH of A.fib currently on lopressor and low-dose heparin and aspiration pna on IV zosyn. Patient currently is on tube feeds due to severe dysphagia . Past Medical History:        Diagnosis Date    Arterial ischemic stroke, MCA (middle cerebral artery), left, acute ([facility] [address] 04/25/2020    Atrial fibrillation (HCC)     Stroke [facility])        Past Surgical History:    History reviewed. No pertinent surgical history. Medications Prior to Admission:   No medications prior to admission. Allergies:  Patient has no known allergies. Social History:   Social History     Socioeconomic History    Marital status:       Spouse name: Not on file    Number of children: Not on file    Years of education: Not on file    Highest education level: Not on file   Occupational History    Not on file   Social Needs    Financial resource strain: Not on file    Food insecurity     Worry: Not on file     Inability: Not on file    Transportation needs     Medical: Not on file     Non-medical: Not on file   Tobacco Use    Smoking status: Not on file   Substance and Sexual Activity    Alcohol use: Not on file    Drug use: Not on file    Sexual activity: Not on file   Lifestyle    Physical activity     Days per week: Not on file     Minutes per session: Not on file    Stress: Not on file   Relationships    Social connections     Talks on phone: Not on file     Gets together: Not on file     Attends Yars[employer] service: Not on file     Active member of club or organization: [employer] 8.3 04/30/2020    GFRAA >60 04/30/2020    LABGLOM >60 04/30/2020    GLUCOSE 189 04/30/2020       Pertinent Radiology:       ASSESSMENT:  Active Hospital Problems    Diagnosis Date Noted    Aspiration pneumonia ([facility] [J69.0] 04/30/2020    Leukocytosis [D72.829] 04/30/2020    Essential hypertension [I10] 04/30/2020    Hypotension [I95.9] 04/30/2020    Atrial fibrillation ([facility] [I48.91] 04/30/2020    Chronic acquired lymphedema [I89.0] 04/30/2020    Dysphagia [R13.10] 04/30/2020    WILLIAM (acute kidney injury) ([facility] [facility] [N17.9] 04/30/2020    Arterial line in place [Z95.828]     Cerebrovascular accident (CVA) ([facility] [address] [I63.9] 04/25/2020    Acute ischemic left MCA stroke (HCC) [I63.512]     Bradycardia [R00.1]          Plan:  1. Continue medical mgmt and supportive care per primary  2. Diet: Tube feeds  3. Analgesia: Percocet   4. DVT prophylaxis:  Low dose heparin SC  5. Antibiotics:  Zosyn   6. Will discuss PEG tube placement with senior resident and attending after reviewing case     Electronically signed by Tati Vargas MD  on 4/30/2020 at 11:57 AM     Patient seen and examined. No abdominal surgeries per scars on abdomen. Heparin drip for A. fib. Seems to comprehend description of feeding tube, but difficulty communicating back. Plan for OR tomorrow for PEG tube placement. N.p.o. at midnight. COVID testing.   Heparin drip held at 2:30 AM.    Electronically signed by Lani Mcdaniel DO on 4/30/2020 at 4:06 PM

## 2020-04-30 NOTE — PROGRESS NOTES
[facility]  Internal Medicine Teaching Residency Program  Inpatient Daily Progress Note  ______________________________________________________________________________    Patient: Robert Schmid  YOB: 1950   RNB:[id number]    Acct: [de-identified]     Room: [address] [place] [address]  Admit date: 4/25/2020  Today's date: 04/30/20  Number of days in the hospital: 5    SUBJECTIVE     Admitting Diagnosis: Acute ischemic left MCA stroke ([address]    CC: Right-sided weakness and left-sided gaze, dysarthria    Patient seen and examined bedside. Labs and chart reviewed. No acute overnight events. Patient lying comfortably in bed. Alert and oriented x3. Severe dysarthria. Persistent right-sided weakness. Follows commands. Denies any complaints at this time. Ongoing wound care for bilateral lower extremity chronic lymphedema with lichen chronicus. Continuing on heparin infusion. ID note reviewed, MRSA nasal swab negative. Continuing on IV Zosyn. Vitally and hemodynamically stable. ROS:  Constitutional:  negative for chills, fevers, sweats  Respiratory:  negative for cough, dyspnea on exertion, hemoptysis, shortness of breath, wheezing  Cardiovascular:  negative for chest pain, chest pressure/discomfort, lower extremity edema, palpitations  Gastrointestinal:  negative for abdominal pain, constipation, diarrhea, nausea, vomiting  Neurological:  negative for dizziness, headache    BRIEF HISTORY     Andi Wang is a 72-year-old male with PMH of A. fib not on AC, stroke who was admitted to neuro ICU on 4/25/2020 for acute left MCA ischemic stroke, presented initially with complaints of aphasia, dysarthria, right-sided weakness and left-sided gaze.      CT head was unremarkable. CTA head and neck showed occlusion of left proximal M2.   MRI brain without contrast showed acute infarct in the left insular cortex and left frontotemporal region along with left

## 2020-04-30 NOTE — CONSULTS
Physical Medicine & Rehabilitation    CHART REVIEW      Admitting Physician: Olden Kanner, MD    Primary Care Provider: No primary care provider on file. Reason for Consult:  Acute Inpatient Rehabilitation    Chief Complaint: CVA    History of Present Illness:  Referring Provider is requesting an evaluation for appropriate placement upon discharge from acute care. Mr. Laya Boyle is a 78 y.o. male who was admitted to [facility] on 4/25/2020 with Cerebrovascular Accident    71-year-old male with history of CVA and A. fib not on anticoagulation presented with right-sided weakness facial droop and left fourth gaze palsy as well as aphasia. CT head unremarkable, patient transferred to [facility]. [facility]taz's for work-up CTA showed M2 acute thrombus. Underwent mechanical thrombectomy. Neuro- MRI showed infarct left insular cortex and left frontotemporal region and small area of acute infarct left parieto-occipital region. He was started on heparin. .  TTE showed 65% ejection fraction, no thrombus or negative bubble. Due to some hemorrhage post thrombectomy will need approximately 10 days from stroke to assess with head CT and then start p.o. Eliquis. (425/25-5/ 5/20)    ID- seen for possible aspiration pneumonia- currently on Zosyn vancomycin discontinued    Neuro interventionalist- EKG showed A. fib started on Lopressor, 4/28 fever possible aspiration- see ID above, severe dysarthria and weak cough will likely require PEG prior to discharge. Heparin infusion for A. fib. Long-term anticoagulation start May 5 with CT head prior to starting    Internal medicine- left MCA CVA episode of bradycardia down to 28 received 0.5 mg for pain proved. Blood pressure labile. Transient support with Levophed.   VA likely embolic bilateral lower extremity lichen chronicus-podiatry and wound care evaluation    endovascular-aspirin, Lipitor, heparin drip, follow-up outpatient    4/27/20 CT head  Left middle cerebral artery Dressing: Maximum assistance;Setup; Increased time to complete(to don socks supine in bed )  Toileting: Maximum assistance;Dependent/Total(brief in place, external catheter )  Additional Comments: Pt supine in bed on arrival. Pt assisted to complete bed mobility and sat at EOB. Pt required assistance to manage R hand on bar of [place], able to maintain  once established. Pt assisted to complete sit <> stand transfer and repositioned at EOB.     4/28  ADL  Grooming: Contact guard assistance;Setup; Increased time to complete(pt washed face and mouth with CGA and setup this date; increased time required)  UE Bathing: Moderate assistance;Setup; Increased time to complete(pt washed BUE while supine in bed with Mod A d/t R side flaccid)  UE Dressing: Moderate assistance;Setup; Increased time to complete(to doff dirty and don clean gown while seated EOB)      ST:    Auditory Comprehension: 2 step commands 1/6 with max cues, 1- step commands 4/5 with max cues. Simple yes/no 5/5 independently, complex yes/no 6/8 with repetition.     Verbal Expression: Nursery rhymes 0/6. Pt. Was able to get the tune of the rhyme and verbalize /a/ X3, however was not able to verbalize sounds of the rhyme. Automatics:  Pt. Was able to verbalize \"one\" with perseveration on \"one\" with max cues. Pt. Verbalized /a/, /o/ and /ee/ with mod cues. Repetition 0/5 with max cues. Unable to verbalize bio. Info with max cues. Past Medical History:        Diagnosis Date    Arterial ischemic stroke, MCA (middle cerebral artery), left, acute ([facility] 04/25/2020    Atrial fibrillation (HCC)     Stroke [facility])        Past Surgical History:    History reviewed. No pertinent surgical history.     Allergies:    No Known Allergies     Current Medications:   Current Facility-Administered Medications: albuterol (PROVENTIL) nebulizer solution 2.5 mg, 2.5 mg, Nebulization, As Directed RT PRN  piperacillin-tazobactam (ZOSYN) 3.375 g in dextrose 5 % 50 mL IVPB 04/30/2020    BUN 27 04/30/2020     Uric Acid  No components found for: URIC  VITAMIN B12 No components found for: B12  PT/INR  No results found for: PTINR    Radiology:     Impression: Mr. Kevin Canseco is a 78 y.o.  male with a history of <principal problem not specified>    1. Left MCA CVA with right hemiparesis and aphasia and dysphasia status post thrombectomy mild hemorrhagic conversion, per neuro note-right facial droop, 0/5 right upper extremity, 3/5 right lower extremity-aspirin Lipitor  2. aphasia/dysphasia-n.p.o., possible PEG, speech follow  3. Aspiration pneumonia -Zosyn albuterol  4. Atrial fibrillation/episode of bradycardia/labile blood pressure- metoprolol, IV heparin convert to oral on 5/5  5. Pain -Percocet  6. Lower extremity lichen chronicus-podiatry and wound care consult pending    Recommendations:  1. Diagnosis: CVA with aphasia/dysphasia and right hemiparesis upper greater than lower extremity  2. Therapy: Has PT/OT and speech needs currently nonambulatory  3. Medical  Necessity: As above  4. Support: Clarify, family  5. Rehab recommendation: await possible PEG,clarfy home support and baseline fct, depending on above -suspect benefit from acute inpatient rehabilitation . Will follow  6. DVT proph: IV heparin     Please call with questions. Claudetta Nyhan. Bernie Lamb MD          This note is created with the assistance of a speech recognition program.  While intending to generate a document that actually reflects the content of the visit, the document can still have some errors including those of syntax and sound a like substitutions which may escape proof reading.   In such instances, actual meaning can be extrapolated by contextual diversion

## 2020-04-30 NOTE — PROGRESS NOTES
Occupational Therapy  Facility/Department: [facility] NEURO  Daily Treatment Note  NAME: Dwayne Moser  : 1950  MRN: [id number]    Date of Service: 2020    Discharge Recommendations:  Patient would benefit from continued therapy after discharge   Ed on OT services, DME/AE, EC/WS, one handed compensatory strategies, ADLs, orientation review, bed mob, umang steady technique- good return       Assessment   Performance deficits / Impairments: Decreased functional mobility ; Decreased ADL status; Decreased ROM; Decreased strength;Decreased endurance;Decreased balance;Decreased high-level IADLs;Decreased posture  Prognosis: Good  REQUIRES OT FOLLOW UP: Yes  Activity Tolerance  Activity Tolerance: Patient Tolerated treatment well  Safety Devices  Safety Devices in place: Yes  Type of devices: All fall risk precautions in place;Call light within reach; Left in chair;Nurse notified; Chair alarm in place         Patient Diagnosis(es): The encounter diagnosis was Cerebrovascular accident (CVA), unspecified mechanism ([facility] [address] has a past medical history of Arterial ischemic stroke, MCA (middle cerebral artery), left, acute ([facility] [address] Atrial fibrillation ([facility] [address] and Stroke ([facility] [address] has no past surgical history on file. Restrictions  Restrictions/Precautions  Restrictions/Precautions: Fall Risk, Up as Tolerated  Required Braces or Orthoses?: No  Subjective   General  Patient assessed for rehabilitation services?: Yes  Family / Caregiver Present: No  General Comment  Comments: RN ok'd pt for OT tx this AM. Pt agreeable to session and pleasant/cooperative throughout  Vital Signs  Patient Currently in Pain: No   Orientation  Orientation  Overall Orientation Status: Within Functional Limits  Objective    ADL  Feeding: NPO    Grooming: Setup; Increased time to complete;Minimal assistance- bed placed in chair position  UE Bathing: Setup; Increased time to complete;Minimal assistance- HOB elevated  UE Dressing: Setup; Moderate assistance;Maximum assistance- HOB elevated  LE Dressing: Setup; Increased time to complete;Dependent/Total  Toileting: Dependent/Total(brief mgt)- supine    PTA arrived to assist w/ bed mob and use Alvarez Hymen steady to transfer from bed to recliner. Pt unable to use RUE functionally and required hand over hand technique to R UE incorporate into self care. Balance  Sitting Balance: Minimal assistance(seated on EOB)  Standing Balance: Moderate assistance(+2 person assistance)  Bed mobility  Rolling to Left: Maximum assistance  Rolling to Right: Maximum assistance  Supine to Sit: Moderate assistance;2 Person assistance  Scooting: Maximal assistance  Comment: HOB elevated and cues given to sequence the task  Transfers  Stand Step Transfers: Unable to assess  Sit to stand: 2 Person assistance; Moderate assistance  Stand to sit: Moderate assistance;2 Person assistance  Transfer Comments: w/umang steady  Attendance  Participation: Active participation         Plan   Plan  Times per week: 4-5x/wk   Current Treatment Recommendations: Functional Mobility Training, Endurance Training, Safety Education & Training, Equipment Evaluation, Education, & procurement, Patient/Caregiver Education & Training, Self-Care / ADL    Goals  Short term goals  Time Frame for Short term goals: by discharge, pt will  Short term goal 1: demo min A for UE ADL activities with set up   Short term goal 2: demo mod A for LE ADL activities with set up   Short term goal 3: demo min A for functional  transfers/ mobility with use of LRD and good safety awareness during fuctional activities   Short term goal 4: demo understanding and I use of ECWS, fall prevention tech during functional activities   Short term goal 5: demo increased activity tolerance of 25+ min to assist with ADL/ functional activities   Short term goal 6: tolerate weightbearing/ PNF patterning to RUE to assist with ADL/ functional activities        Therapy Time   Individual Concurrent

## 2020-04-30 NOTE — PLAN OF CARE
Derrek Padilla, CHRISTINEPPatient Assessment complete. Acute CVA (cerebrovascular accident) ([facility] [I63.9]  Acute CVA (cerebrovascular accident) ([facility] [address] [I63.9] . Vitals:    04/30/20 1148   BP: (!) 146/52   Pulse: 76   Resp: 22   Temp: 98.5 °F (36.9 °C)   SpO2: 92%   . Patients home meds are   Prior to Admission medications    Not on File   .       Assessment   Pt does not take at home medications for breathing   Pt does not wear an at home CPAP  Pt does not wear home O2    RR: 22  Breath Sounds: clear, diminished      Bronchodilator assessment at level 1  Hyperinflation assessment at level   Secretion Management assessment at level      [x]    Bronchodilator Assessment  BRONCHODILATOR ASSESSMENT SCORE  Score 0 1 2 3 4 5   Breath Sounds   []  Patient Baseline [x]  No Wheeze good aeration []  Faint, scattered wheezing, good aeration []  Expiratory Wheezing and or moderately diminished []  Insp/Exp wheeze and/or very diminished []  Insp/Exp and/ or marked distress   Respiratory Rate   []  Patient Baseline [x]  Less than 20 []  Less than 20 []  20-25 []  Greater than 25 []  Greater than 25   Peak flow % of Pred or PB [x]  NA   []  Greater than 90%  []  81-90% []  71-80% []  Less than or equal to 70%  or unable to perform []  Unable due to Respiratory Distress   Dyspnea re [x]  Patient Baseline [x]  No SOB [x]  No SOB []  SOB on exertion []  SOB min activity []  At rest/acute   e FEV% Predicted       [x]  NA []  Above 69%  []  Unable []  Above 60-69%  []  Unable []  Above 50-59%  []  Unable []  Above 35-49%  []  Unable []  Less than 35%  []  Unable                 []  Hyperinflation Assessment  Score 1 2 3   CXR and Breath Sounds   []  Clear []  No atelectasis  Basilar aeration []  Atelectasis or absent basilar breath sounds   Incentive Spirometry Volume  (Per IBW)   []  Greater than or equal to 15ml/Kg []  less than 15ml/Kg []  less than 15ml/Kg   Surgery within last 2 weeks []  None or general   []  Abdominal or thoracic surgery  []  Abdominal or thoracic   Chronic Pulmonary Historyre []  No []  Yes []  Yes     []  Secretion Management Assessment  Score 1 2 3   Bilateral Breath Sounds   []  Occasional Rhonchi []  Scattered Rhonchi []  Course Rhonchi and/or poor aeration   Sputum    []  Small amount of thin secretions []  Moderate amount of viscous secretions []  Copius, Viscious Yellow/ Secretions   CXR as reported by physician []  clear  []  Unavailable []  Infiltrates and/or consolidation  []  Unavailable []  Mucus Plugging and or lobar consolidation  []  Unavailable   Cough []  Strong, productive cough []  Weak productive cough []  No cough or weak non-productive cough   [facility]  12:07 PM                            FEMALE                                  MALE                            FEV1 Predicted Normal Values                        FEV1 Predicted Normal Values          Age                                     Height in Feet and Inches       Age                                     Height in Feet and Inches       4' 11\" 5' 1\" 5' 3\" 5' 5\" 5' 7\" 5' 9\" 5' 11\" 6' 1\"  4' 11\" 5' 1\" 5' 3\" 5' 5\" 5' 7\" 5' 9\" 5' 11\" 6' 1\"   42 - 45 2.49 2.66 2.84 3.03 3.22 3.42 3.62 3.83 42 - 45 2.82 3.03 3.26 3.49 3.72 3.96 4.22 4.47   46 - 49 2.40 2.57 2.76 2.94 3.14 3.33 3.54 3.75 46 - 49 2.70 2.92 3.14 3.37 3.61 3.85 4.10 4.36   50 - 53 2.31 2.48 2.66 2.85 3.04 3.24 3.45 3.66 50 - 53 2.58 2.80 3.02 3.25 3.49 3.73 3.98 4.24   54 - 57 2.21 2.38 2.57 2.75 2.95 3.14 3.35 3.56 54 - 57 2.46 2.67 2.89 3.12 3.36 3.60 3.85 4.11   58 - 61 2.10 2.28 2.46 2.65 2.84 3.04 3.24 3.45 58 - 61 2.32 2.54 2.76 2.99 3.23 3.47 3.72 3.98   62 - 65 1.99 2.17 2.35 2.54 2.73 2.93 3.13 3.34 62 - 65 2.19 2.40 2.62 2.85 3.09 3.33 3.58 3.84   66 - 69 1.88 2.05 2.23 2.42 2.61 2.81 3.02 3.23 66 - 69 2.04 2.26 2.48 2.71 2.95 3.19 3.44 3.70   70+ 1.82 1.99 2.17 2.36 2.55 2.75 2.95 3.16 70+ 1.97 2.19 2.41 2.64 2.87 3.12 3.37 3.62             Predicted Peak Expiratory Flow Rate

## 2020-05-01 ENCOUNTER — ANESTHESIA EVENT (OUTPATIENT)
Dept: ENDOSCOPY | Age: 70
DRG: 023 | End: 2020-05-01
Payer: MEDICARE

## 2020-05-01 ENCOUNTER — ANESTHESIA (OUTPATIENT)
Dept: ENDOSCOPY | Age: 70
DRG: 023 | End: 2020-05-01
Payer: MEDICARE

## 2020-05-01 VITALS — OXYGEN SATURATION: 96 % | DIASTOLIC BLOOD PRESSURE: 110 MMHG | SYSTOLIC BLOOD PRESSURE: 182 MMHG

## 2020-05-01 LAB
ABSOLUTE EOS #: 0.51 K/UL (ref 0–0.44)
ABSOLUTE IMMATURE GRANULOCYTE: 0.08 K/UL (ref 0–0.3)
ABSOLUTE LYMPH #: 1.07 K/UL (ref 1.1–3.7)
ABSOLUTE MONO #: 1 K/UL (ref 0.1–1.2)
ANION GAP SERPL CALCULATED.3IONS-SCNC: 15 MMOL/L (ref 9–17)
BASOPHILS # BLD: 1 % (ref 0–2)
BASOPHILS ABSOLUTE: 0.1 K/UL (ref 0–0.2)
BUN BLDV-MCNC: 24 MG/DL (ref 8–23)
BUN/CREAT BLD: ABNORMAL (ref 9–20)
CALCIUM SERPL-MCNC: 8.8 MG/DL (ref 8.6–10.4)
CHLORIDE BLD-SCNC: 101 MMOL/L (ref 98–107)
CO2: 23 MMOL/L (ref 20–31)
CREAT SERPL-MCNC: 1 MG/DL (ref 0.7–1.2)
DIFFERENTIAL TYPE: ABNORMAL
EOSINOPHILS RELATIVE PERCENT: 4 % (ref 1–4)
GFR AFRICAN AMERICAN: >60 ML/MIN
GFR NON-AFRICAN AMERICAN: >60 ML/MIN
GFR SERPL CREATININE-BSD FRML MDRD: ABNORMAL ML/MIN/{1.73_M2}
GFR SERPL CREATININE-BSD FRML MDRD: ABNORMAL ML/MIN/{1.73_M2}
GLUCOSE BLD-MCNC: 125 MG/DL (ref 70–99)
GLUCOSE BLD-MCNC: 136 MG/DL (ref 75–110)
HCT VFR BLD CALC: 43.9 % (ref 40.7–50.3)
HEMOGLOBIN: 13.8 G/DL (ref 13–17)
IMMATURE GRANULOCYTES: 1 %
LYMPHOCYTES # BLD: 9 % (ref 24–43)
MAGNESIUM: 2.4 MG/DL (ref 1.6–2.6)
MCH RBC QN AUTO: 28.5 PG (ref 25.2–33.5)
MCHC RBC AUTO-ENTMCNC: 31.4 G/DL (ref 28.4–34.8)
MCV RBC AUTO: 90.7 FL (ref 82.6–102.9)
MONOCYTES # BLD: 9 % (ref 3–12)
NRBC AUTOMATED: 0 PER 100 WBC
PARTIAL THROMBOPLASTIN TIME: 28.5 SEC (ref 20.5–30.5)
PDW BLD-RTO: 14.7 % (ref 11.8–14.4)
PLATELET # BLD: 204 K/UL (ref 138–453)
PLATELET ESTIMATE: ABNORMAL
PMV BLD AUTO: 11.9 FL (ref 8.1–13.5)
POTASSIUM SERPL-SCNC: 3.9 MMOL/L (ref 3.7–5.3)
RBC # BLD: 4.84 M/UL (ref 4.21–5.77)
RBC # BLD: ABNORMAL 10*6/UL
SARS-COV-2, PCR: NORMAL
SARS-COV-2, RAPID: NORMAL
SARS-COV-2: NOT DETECTED
SEG NEUTROPHILS: 76 % (ref 36–65)
SEGMENTED NEUTROPHILS ABSOLUTE COUNT: 8.88 K/UL (ref 1.5–8.1)
SODIUM BLD-SCNC: 139 MMOL/L (ref 135–144)
SOURCE: NORMAL
WBC # BLD: 11.6 K/UL (ref 3.5–11.3)
WBC # BLD: ABNORMAL 10*3/UL

## 2020-05-01 PROCEDURE — 99232 SBSQ HOSP IP/OBS MODERATE 35: CPT | Performed by: PSYCHIATRY & NEUROLOGY

## 2020-05-01 PROCEDURE — 80048 BASIC METABOLIC PNL TOTAL CA: CPT

## 2020-05-01 PROCEDURE — 0DH63UZ INSERTION OF FEEDING DEVICE INTO STOMACH, PERCUTANEOUS APPROACH: ICD-10-PCS | Performed by: SURGERY

## 2020-05-01 PROCEDURE — 99232 SBSQ HOSP IP/OBS MODERATE 35: CPT | Performed by: NURSE PRACTITIONER

## 2020-05-01 PROCEDURE — 2060000000 HC ICU INTERMEDIATE R&B

## 2020-05-01 PROCEDURE — 83735 ASSAY OF MAGNESIUM: CPT

## 2020-05-01 PROCEDURE — 82947 ASSAY GLUCOSE BLOOD QUANT: CPT

## 2020-05-01 PROCEDURE — 99232 SBSQ HOSP IP/OBS MODERATE 35: CPT | Performed by: INTERNAL MEDICINE

## 2020-05-01 PROCEDURE — 2580000003 HC RX 258: Performed by: NURSE ANESTHETIST, CERTIFIED REGISTERED

## 2020-05-01 PROCEDURE — APPSS30 APP SPLIT SHARED TIME 16-30 MINUTES: Performed by: NURSE PRACTITIONER

## 2020-05-01 PROCEDURE — 36415 COLL VENOUS BLD VENIPUNCTURE: CPT

## 2020-05-01 PROCEDURE — 6360000002 HC RX W HCPCS: Performed by: INTERNAL MEDICINE

## 2020-05-01 PROCEDURE — 6360000002 HC RX W HCPCS: Performed by: STUDENT IN AN ORGANIZED HEALTH CARE EDUCATION/TRAINING PROGRAM

## 2020-05-01 PROCEDURE — 6370000000 HC RX 637 (ALT 250 FOR IP): Performed by: STUDENT IN AN ORGANIZED HEALTH CARE EDUCATION/TRAINING PROGRAM

## 2020-05-01 PROCEDURE — 85730 THROMBOPLASTIN TIME PARTIAL: CPT

## 2020-05-01 PROCEDURE — 2500000003 HC RX 250 WO HCPCS: Performed by: NURSE ANESTHETIST, CERTIFIED REGISTERED

## 2020-05-01 PROCEDURE — 3700000000 HC ANESTHESIA ATTENDED CARE: Performed by: SURGERY

## 2020-05-01 PROCEDURE — 85025 COMPLETE CBC W/AUTO DIFF WBC: CPT

## 2020-05-01 PROCEDURE — 6370000000 HC RX 637 (ALT 250 FOR IP): Performed by: NURSE PRACTITIONER

## 2020-05-01 PROCEDURE — 43246 EGD PLACE GASTROSTOMY TUBE: CPT | Performed by: SURGERY

## 2020-05-01 PROCEDURE — 2580000003 HC RX 258: Performed by: INTERNAL MEDICINE

## 2020-05-01 PROCEDURE — 3700000001 HC ADD 15 MINUTES (ANESTHESIA): Performed by: SURGERY

## 2020-05-01 PROCEDURE — 2580000003 HC RX 258: Performed by: STUDENT IN AN ORGANIZED HEALTH CARE EDUCATION/TRAINING PROGRAM

## 2020-05-01 PROCEDURE — 2720000010 HC SURG SUPPLY STERILE: Performed by: SURGERY

## 2020-05-01 PROCEDURE — 99233 SBSQ HOSP IP/OBS HIGH 50: CPT | Performed by: PSYCHIATRY & NEUROLOGY

## 2020-05-01 PROCEDURE — 7100000000 HC PACU RECOVERY - FIRST 15 MIN: Performed by: SURGERY

## 2020-05-01 PROCEDURE — 6360000002 HC RX W HCPCS: Performed by: NURSE ANESTHETIST, CERTIFIED REGISTERED

## 2020-05-01 PROCEDURE — 7100000001 HC PACU RECOVERY - ADDTL 15 MIN: Performed by: SURGERY

## 2020-05-01 PROCEDURE — 92507 TX SP LANG VOICE COMM INDIV: CPT

## 2020-05-01 PROCEDURE — 2500000003 HC RX 250 WO HCPCS: Performed by: STUDENT IN AN ORGANIZED HEALTH CARE EDUCATION/TRAINING PROGRAM

## 2020-05-01 PROCEDURE — 3609013300 HC EGD TUBE PLACEMENT: Performed by: SURGERY

## 2020-05-01 RX ORDER — ATORVASTATIN CALCIUM 40 MG/1
40 TABLET, FILM COATED ORAL NIGHTLY
Status: CANCELLED | OUTPATIENT
Start: 2020-05-01

## 2020-05-01 RX ORDER — ASPIRIN 81 MG/1
81 TABLET, CHEWABLE ORAL DAILY
Status: CANCELLED | OUTPATIENT
Start: 2020-05-02

## 2020-05-01 RX ORDER — ESMOLOL HYDROCHLORIDE 10 MG/ML
INJECTION INTRAVENOUS PRN
Status: DISCONTINUED | OUTPATIENT
Start: 2020-05-01 | End: 2020-05-01 | Stop reason: SDUPTHER

## 2020-05-01 RX ORDER — ATORVASTATIN CALCIUM 40 MG/1
40 TABLET, FILM COATED ORAL NIGHTLY
Status: DISCONTINUED | OUTPATIENT
Start: 2020-05-01 | End: 2020-05-03 | Stop reason: HOSPADM

## 2020-05-01 RX ORDER — ONDANSETRON 2 MG/ML
4 INJECTION INTRAMUSCULAR; INTRAVENOUS
Status: ACTIVE | OUTPATIENT
Start: 2020-05-01 | End: 2020-05-01

## 2020-05-01 RX ORDER — ALBUTEROL SULFATE 2.5 MG/3ML
2.5 SOLUTION RESPIRATORY (INHALATION)
Status: CANCELLED | OUTPATIENT
Start: 2020-05-01

## 2020-05-01 RX ORDER — DEXAMETHASONE SODIUM PHOSPHATE 10 MG/ML
INJECTION INTRAMUSCULAR; INTRAVENOUS PRN
Status: DISCONTINUED | OUTPATIENT
Start: 2020-05-01 | End: 2020-05-01 | Stop reason: SDUPTHER

## 2020-05-01 RX ORDER — SODIUM CHLORIDE, SODIUM LACTATE, POTASSIUM CHLORIDE, CALCIUM CHLORIDE 600; 310; 30; 20 MG/100ML; MG/100ML; MG/100ML; MG/100ML
INJECTION, SOLUTION INTRAVENOUS CONTINUOUS PRN
Status: DISCONTINUED | OUTPATIENT
Start: 2020-05-01 | End: 2020-05-01 | Stop reason: SDUPTHER

## 2020-05-01 RX ORDER — SUCCINYLCHOLINE/SOD CL,ISO/PF 100 MG/5ML
SYRINGE (ML) INTRAVENOUS PRN
Status: DISCONTINUED | OUTPATIENT
Start: 2020-05-01 | End: 2020-05-01 | Stop reason: SDUPTHER

## 2020-05-01 RX ORDER — AMOXICILLIN AND CLAVULANATE POTASSIUM 875; 125 MG/1; MG/1
1 TABLET, FILM COATED ORAL EVERY 12 HOURS SCHEDULED
Status: DISCONTINUED | OUTPATIENT
Start: 2020-05-01 | End: 2020-05-02

## 2020-05-01 RX ORDER — ASPIRIN 81 MG/1
81 TABLET, CHEWABLE ORAL DAILY
Status: DISCONTINUED | OUTPATIENT
Start: 2020-05-01 | End: 2020-05-03 | Stop reason: HOSPADM

## 2020-05-01 RX ORDER — ATORVASTATIN CALCIUM 40 MG/1
40 TABLET, FILM COATED ORAL NIGHTLY
Qty: 30 TABLET | Refills: 3 | Status: CANCELLED | OUTPATIENT
Start: 2020-05-01

## 2020-05-01 RX ORDER — FENTANYL CITRATE 50 UG/ML
25 INJECTION, SOLUTION INTRAMUSCULAR; INTRAVENOUS EVERY 5 MIN PRN
Status: DISCONTINUED | OUTPATIENT
Start: 2020-05-01 | End: 2020-05-03 | Stop reason: HOSPADM

## 2020-05-01 RX ORDER — AMOXICILLIN AND CLAVULANATE POTASSIUM 875; 125 MG/1; MG/1
1 TABLET, FILM COATED ORAL 2 TIMES DAILY
Qty: 3 TABLET | Refills: 0 | Status: SHIPPED | OUTPATIENT
Start: 2020-05-01 | End: 2020-05-02

## 2020-05-01 RX ORDER — METOPROLOL TARTRATE 5 MG/5ML
2.5 INJECTION INTRAVENOUS ONCE
Status: COMPLETED | OUTPATIENT
Start: 2020-05-01 | End: 2020-05-01

## 2020-05-01 RX ORDER — DIPHENHYDRAMINE HYDROCHLORIDE 50 MG/ML
12.5 INJECTION INTRAMUSCULAR; INTRAVENOUS
Status: ACTIVE | OUTPATIENT
Start: 2020-05-01 | End: 2020-05-01

## 2020-05-01 RX ORDER — AMOXICILLIN AND CLAVULANATE POTASSIUM 875; 125 MG/1; MG/1
1 TABLET, FILM COATED ORAL EVERY 12 HOURS SCHEDULED
Status: CANCELLED | OUTPATIENT
Start: 2020-05-01 | End: 2020-05-03

## 2020-05-01 RX ORDER — ONDANSETRON 2 MG/ML
INJECTION INTRAMUSCULAR; INTRAVENOUS PRN
Status: DISCONTINUED | OUTPATIENT
Start: 2020-05-01 | End: 2020-05-01 | Stop reason: SDUPTHER

## 2020-05-01 RX ORDER — LIDOCAINE HYDROCHLORIDE 10 MG/ML
INJECTION, SOLUTION INFILTRATION; PERINEURAL PRN
Status: DISCONTINUED | OUTPATIENT
Start: 2020-05-01 | End: 2020-05-01 | Stop reason: SDUPTHER

## 2020-05-01 RX ORDER — FENTANYL CITRATE 50 UG/ML
INJECTION, SOLUTION INTRAMUSCULAR; INTRAVENOUS PRN
Status: DISCONTINUED | OUTPATIENT
Start: 2020-05-01 | End: 2020-05-01 | Stop reason: SDUPTHER

## 2020-05-01 RX ORDER — GLYCOPYRROLATE 1 MG/5 ML
SYRINGE (ML) INTRAVENOUS PRN
Status: DISCONTINUED | OUTPATIENT
Start: 2020-05-01 | End: 2020-05-01 | Stop reason: SDUPTHER

## 2020-05-01 RX ORDER — LABETALOL 20 MG/4 ML (5 MG/ML) INTRAVENOUS SYRINGE
5 EVERY 10 MIN PRN
Status: DISCONTINUED | OUTPATIENT
Start: 2020-05-01 | End: 2020-05-02

## 2020-05-01 RX ORDER — HEPARIN SODIUM 10000 [USP'U]/100ML
1000 INJECTION, SOLUTION INTRAVENOUS CONTINUOUS
Status: DISCONTINUED | OUTPATIENT
Start: 2020-05-01 | End: 2020-05-01

## 2020-05-01 RX ORDER — PROPOFOL 10 MG/ML
INJECTION, EMULSION INTRAVENOUS PRN
Status: DISCONTINUED | OUTPATIENT
Start: 2020-05-01 | End: 2020-05-01 | Stop reason: SDUPTHER

## 2020-05-01 RX ORDER — ASPIRIN 81 MG/1
81 TABLET, CHEWABLE ORAL DAILY
Qty: 30 TABLET | Refills: 3 | Status: CANCELLED | OUTPATIENT
Start: 2020-05-01

## 2020-05-01 RX ADMIN — ESMOLOL HYDROCHLORIDE 5 MG: 10 INJECTION, SOLUTION INTRAVENOUS at 08:52

## 2020-05-01 RX ADMIN — PROPOFOL 200 MG: 10 INJECTION, EMULSION INTRAVENOUS at 08:29

## 2020-05-01 RX ADMIN — Medication 15 ML: at 22:06

## 2020-05-01 RX ADMIN — Medication 100 MG: at 08:29

## 2020-05-01 RX ADMIN — ONDANSETRON 4 MG: 2 INJECTION, SOLUTION INTRAMUSCULAR; INTRAVENOUS at 08:39

## 2020-05-01 RX ADMIN — SODIUM CHLORIDE, POTASSIUM CHLORIDE, SODIUM LACTATE AND CALCIUM CHLORIDE: 600; 310; 30; 20 INJECTION, SOLUTION INTRAVENOUS at 08:20

## 2020-05-01 RX ADMIN — ENOXAPARIN SODIUM 150 MG: 150 INJECTION SUBCUTANEOUS at 22:06

## 2020-05-01 RX ADMIN — FENTANYL CITRATE 25 MCG: 50 INJECTION INTRAMUSCULAR; INTRAVENOUS at 08:34

## 2020-05-01 RX ADMIN — AMOXICILLIN AND CLAVULANATE POTASSIUM 1 TABLET: 875; 125 TABLET, FILM COATED ORAL at 22:06

## 2020-05-01 RX ADMIN — LIDOCAINE HYDROCHLORIDE 50 MG: 10 INJECTION, SOLUTION INFILTRATION; PERINEURAL at 08:29

## 2020-05-01 RX ADMIN — DESMOPRESSIN ACETATE 40 MG: 0.2 TABLET ORAL at 22:25

## 2020-05-01 RX ADMIN — PIPERACILLIN AND TAZOBACTAM 3.38 G: 3; .375 INJECTION, POWDER, FOR SOLUTION INTRAVENOUS at 06:48

## 2020-05-01 RX ADMIN — ASPIRIN 81 MG: 81 TABLET, CHEWABLE ORAL at 17:05

## 2020-05-01 RX ADMIN — SODIUM CHLORIDE, PRESERVATIVE FREE 10 ML: 5 INJECTION INTRAVENOUS at 22:56

## 2020-05-01 RX ADMIN — Medication 0.2 MG: at 08:36

## 2020-05-01 RX ADMIN — DEXAMETHASONE SODIUM PHOSPHATE 10 MG: 10 INJECTION INTRAMUSCULAR; INTRAVENOUS at 08:39

## 2020-05-01 RX ADMIN — METOPROLOL TARTRATE 2.5 MG: 1 INJECTION, SOLUTION INTRAVENOUS at 17:01

## 2020-05-01 RX ADMIN — METOPROLOL TARTRATE 12.5 MG: 25 TABLET ORAL at 22:06

## 2020-05-01 RX ADMIN — Medication 15 ML: at 17:13

## 2020-05-01 ASSESSMENT — PAIN SCALES - WONG BAKER

## 2020-05-01 ASSESSMENT — PULMONARY FUNCTION TESTS
PIF_VALUE: 36
PIF_VALUE: 24
PIF_VALUE: 0
PIF_VALUE: 36
PIF_VALUE: 23
PIF_VALUE: 27
PIF_VALUE: 7
PIF_VALUE: 2
PIF_VALUE: 2
PIF_VALUE: 33
PIF_VALUE: 0
PIF_VALUE: 41
PIF_VALUE: 1
PIF_VALUE: 1
PIF_VALUE: 21
PIF_VALUE: 25
PIF_VALUE: 1
PIF_VALUE: 0
PIF_VALUE: 20
PIF_VALUE: 1
PIF_VALUE: 1
PIF_VALUE: 6
PIF_VALUE: 28
PIF_VALUE: 29
PIF_VALUE: 4
PIF_VALUE: 2
PIF_VALUE: 2
PIF_VALUE: 18
PIF_VALUE: 1
PIF_VALUE: 1
PIF_VALUE: 6
PIF_VALUE: 24
PIF_VALUE: 1
PIF_VALUE: 24

## 2020-05-01 ASSESSMENT — PAIN SCALES - GENERAL
PAINLEVEL_OUTOF10: 0

## 2020-05-01 ASSESSMENT — PAIN - FUNCTIONAL ASSESSMENT: PAIN_FUNCTIONAL_ASSESSMENT: 0-10

## 2020-05-01 NOTE — PROGRESS NOTES
bradycardia with HR as low as 28, received 1 dose of 0.5 mg atropine which improved the heart rate. His blood pressure was labile, initially hypertensive on clevidipine drip and later developed hypotension requiring transient pressor support with Levophed. 1 episode of sustained VT, asymptomatic. EKG obtained showed controlled heart rate A. fib.     Patient was febrile with T-max of 99.2 °F yesterday, leukocytosis with WBC peaking at 24.6, chest x-ray with concern for RLL aspiration pneumonia. Started on IV vancomycin and Zosyn and consulted ID.     2D echo showed normal EF, no DD, negative bubble study. OBJECTIVE     Vital Signs:  BP (!) 144/78   Pulse 93   Temp 99.5 °F (37.5 °C) (Temporal)   Resp 26   Ht 5' 10\" (1.778 m) Comment: FROM FLOOR  Wt (!) 320 lb (145.2 kg) Comment: FROM FLOOR  SpO2 96%   BMI 45.92 kg/m²     Temp (24hrs), Av.4 °F (36.9 °C), Min:97.7 °F (36.5 °C), Max:99.5 °F (37.5 °C)    In: 230   Out: 0     Physical Exam:  General appearance: Comfortably sleeping. Arousable on verbal commands. Oriented x2. Appropriately answers questions. Follows commands. HEENT: Atraumatic. Normocephalic. No conjunctival injections. EOMI. Neck: No adenopathy, no JVD, supple symmetrical trachea midline. Lungs: Bilateral air entry present. Rhonchi present in right lower lobe. No wheezing. Heart: S1-S2 heard, no added sounds. Abdomen: soft, non-tender; bowel sounds normal; no masses,  no organomegaly  Extremities: Bilateral lower extremity lymphedema present. Severe lichen chronicus present extending up to below knee. Neurologic: Right-sided weakness present. Slurred speech, dysarthria.     Medications:  Scheduled Medications:    piperacillin-tazobactam  3.375 g Intravenous Q8H    [Held by provider] lisinopril  10 mg Oral Daily    atorvastatin  40 mg Oral Nightly    metoprolol tartrate  12.5 mg Oral BID    sennosides-docusate sodium  2 tablet Oral Daily    chlorhexidine  15 mL

## 2020-05-01 NOTE — PROGRESS NOTES
Occupational Therapy Not Seen Note    DATE: 2020  Name: Lisbeth Mcmullen  : 1950  MRN: [id number]    Patient not available for Occupational Therapy due to: Attempt this am but pt off the unit for a peg.     Next Scheduled Treatment: 2020    Electronically signed by HESHAM Sifuentes on 2020 at 8:17 AM

## 2020-05-01 NOTE — PROGRESS NOTES
427 and had some hypotension requiring some Levophed. He developed fever to 38.5 and white blood cell count did increase with episodes of hypoxia requiring CPAP and 15 L of supplemental oxygen. This x-ray seem to suggest right lower lobe consolidation and there was concern for aspiration pneumonia and the patient was started on broad-spectrum antimicrobial therapy with Zosyn and vancomycin and cultures were sent. I was asked to evaluate as the patient did have some issues with clotting, the fevers, and leukocytosis and one other consideration was for novel coronavirus infection with the thrombotic events. Current evaluation:2020     Low-grade fevers. T max 99.5. Blood cultures from 20 remain negative to date. PEG placed this morning. Leukocytosis continues to trend down. Covid PCR-Negative. Planning to discharge tonight to [facility]. Aphasic. Shakes head no to all questions. /74   Pulse 86   Temp 99.2 °F (37.3 °C) (Oral)   Resp (!) 90   Ht 5' 10\" (1.778 m) Comment: FROM FLOOR  Wt (!) 320 lb (145.2 kg) Comment: FROM FLOOR  SpO2 90%   BMI 45.92 kg/m²     Temperature Range: Temp: 99.2 °F (37.3 °C) Temp  Av.5 °F (36.9 °C)  Min: 97.7 °F (36.5 °C)  Max: 99.5 °F (37.5 °C)      Review of Systems   Unable to perform ROS: Patient nonverbal       Physical Examination :     Physical Exam  Vitals signs and nursing note reviewed. Constitutional:       Appearance: He is well-developed. Comments: Aphasic   HENT:      Head: Normocephalic and atraumatic. Nose: Nose normal.      Mouth/Throat:      Mouth: Mucous membranes are dry. Pharynx: Oropharynx is clear. Eyes:      Extraocular Movements: Extraocular movements intact. Conjunctiva/sclera: Conjunctivae normal.      Pupils: Pupils are equal, round, and reactive to light. Neck:      Musculoskeletal: Normal range of motion and neck supple. Cardiovascular:      Rate and Rhythm: Normal rate and regular rhythm. [[id number]] Collected: 04/28/20 1212   Order Status: Completed Specimen: Blood Updated: 04/30/20 0032    Specimen Description . BLOOD    Special Requests 10ML    Culture NO GROWTH 2 DAYS   MRSA DNA Probe, Nasal [[id number]] Collected: 04/28/20 1728   Order Status: Completed Specimen: Nasal Updated: 04/29/20 1607    Specimen Description . NASAL SWAB    MRSA, DNA, Nasal NEGATIVE:  MRSA DNA not detected by nucleic acid amplification. Culture, Urine [[id number]] Collected: 04/26/20 0745   Order Status: Completed Specimen: Urine, clean catch Updated: 04/27/20 0740    Specimen Description . CLEAN CATCH URINE    Special Requests NOT REPORTED    Culture NO GROWTH   MRSA DNA Probe, Nasal [[id number]] Collected: 04/25/20 1342   Order Status: Completed Specimen: Nasal Updated: 04/26/20 1407    Specimen Description . NASAL SWAB    MRSA, DNA, Nasal NEGATIVE:  MRSA DNA not detected by nucleic acid amplification. Comment:                                                    Results should be used as an adjunct to nosocomial control efforts to identify patients   needing enhanced precautions.     The test is not intended to identify patients with staphylococcal infections.  Results   should not be used to guide or monitor treatment for MRSA infections. Medications:      aspirin  81 mg PEG Tube Daily    atorvastatin  40 mg PEG Tube Nightly    metoprolol tartrate  12.5 mg PEG Tube BID    enoxaparin  1 mg/kg Subcutaneous BID    metoprolol  2.5 mg Intravenous Once    piperacillin-tazobactam  3.375 g Intravenous Q8H    [Held by provider] lisinopril  10 mg Oral Daily    sennosides-docusate sodium  2 tablet Oral Daily    chlorhexidine  15 mL Mouth/Throat BID    sodium chloride flush  10 mL Intravenous 2 times per day             Electronically signed by MEGHA Chaudhary CNP on 5/1/2020 at 12:52 PM  Thank you for allowing us to participate in the care of this patient. Please call with questions.     This note

## 2020-05-01 NOTE — PROGRESS NOTES
Speech Language Pathology  Speech Language Pathology  [facility]    Speech Language Treatment Note    Date: 5/1/2020  Patients Name: Rome Ruggiero  MRN: [id number]  Patient Active Problem List   Diagnosis Code    Cerebrovascular accident (CVA) ([facility] I63.9    Acute ischemic left MCA stroke ([facility] I63.512    Bradycardia R00.1    Arterial line in place Z95.828    Asp[facility] ([facility] J69.0    Leukocytosis D72.829    Essential hypertension I10    Hypotension I95.9    Atrial fibrillation ([facility] I48.91    Chronic acquired lymphedema I89.0    Dysphagia R13.10    WILLIAM (acute kidney injury) ([facility] N17.9       Pain: 0/10    Speech and Language Treatment  Treatment time: 0784-2121    Subjective: [x] Alert [x] Cooperative     [] Confused     [] Agitated    [] Lethargic      Objective/Assessment:  Auditory Comprehension: 1 unit commands: 66% increased to 100% with max visual cues           2 unit commands: 40% increased to 90% with min-max verbal/visual cues        Verbal Expression: Automatic speech tasks: 1/10 with max verbal and visual cues and phonemic cues                      Nursery Rhymes:  1/10. Pt. Was able to get the tune of the rhyme and verbalize \"wow\", however was not able to verbalize sounds of the rhyme. Pt. Verbalized \"wow\" for most verbalizations when trying to communicate. Biographical information:  Unable to state first and last name and age with max phonemic cues             Naming objects:  0/2 with max phonemic cues        Plan:  [x] Continue  services    [] Discharge from [address] [place] [address] Dr:      Discharge recommendations: [] Inpatient Rehab   [] [place]   [] Outpatient Therapy  [] Follow up at trauma clinic   [x] Other: Further therapy recommended at discharge. Treatment completed by: Lety Oneill M.A.  CCC-SLP

## 2020-05-01 NOTE — PROGRESS NOTES
ENDOVASCULAR NEUROSURGERY PROGRESS NOTE  5/1/2020 12:14 PM  Subjective:   Admit Date: 4/25/2020  PCP: No primary care provider on file. No new acute events. Status post PEG tube placement. Objective:   Vitals: /74   Pulse 90   Temp 99.2 °F (37.3 °C) (Oral)   Resp 23   Ht 5' 10\" (1.778 m) Comment: FROM FLOOR  Wt (!) 320 lb (145.2 kg) Comment: FROM FLOOR  SpO2 90%   BMI 45.92 kg/m²     General appearance: Lying in bed, NAD,  Lungs: CTAB  Heart: RRR  Abdomen: soft, NTND, bowel sounds normal    Neuro exam: Follows some simple commands, left gaze preference. CN II-XII: pupils 2 mm reactive to light bilaterally, VFF. Right upper extremity is flaccid. He moves right lower extremity against gravity. .  Moving left upper and left lower extremities spontaneously against gravity. Medications and labs:   Scheduled Meds:   piperacillin-tazobactam  3.375 g Intravenous Q8H    [Held by provider] lisinopril  10 mg Oral Daily    atorvastatin  40 mg Oral Nightly    metoprolol tartrate  12.5 mg Oral BID    sennosides-docusate sodium  2 tablet Oral Daily    chlorhexidine  15 mL Mouth/Throat BID    sodium chloride flush  10 mL Intravenous 2 times per day    aspirin  81 mg Oral Daily    Or    aspirin  300 mg Rectal Daily     Continuous Infusions:   heparin (porcine)      dextrose       CBC:   Recent Labs     04/29/20  0600 04/30/20  0427 05/01/20  0556   WBC 13.9* 13.8* 11.6*   HGB 12.8* 12.7* 13.8    197 204     BMP:    Recent Labs     04/29/20  0600 04/30/20  0427 05/01/20  0556    137 139   K 3.7 3.6* 3.9    101 101   CO2 22 23 23   BUN 35* 27* 24*   CREATININE 1.26* 1.01 1.00   GLUCOSE 196* 189* 125*     Hepatic: No results for input(s): AST, ALT, ALB, BILITOT, ALKPHOS in the last 72 hours. Troponin: No results for input(s): TROPONINI in the last 72 hours. BNP: No results for input(s): BNP in the last 72 hours.   Lipids:   No results for input(s): CHOL, HDL in the last 72 hours.    Invalid input(s): LDLCALCU  INR:   No results for input(s): INR in the last 72 hours. Assessment and Recommendations:   78year old with left MCA stroke mostly insular stroke and left M2 occlusion s/p MT resulted in TICI 2c 4/25/2020. Etiology cardiomebolic due to Afib. S/p cerebral angiogram in April 25, 2020  Impression:   1. Left MCA M1 proximal clot resulting in acute ischemic stroke due to cardioembolic event treated with 3 passes of mechanical thrombectomy using Solitaire 4 x 40 mm and a large bore reperfusion catheter resulted in tici 2c with persistent distal end at    C6 occlusion that is not amenable to mechanical thrombectomy.         He is on IV antibiotics for possible aspiration pneumonia. He is status post PEG tube placement. 1. ASA 81 mg daily  2. lipitor 40 mg daily  3. Heparin drip was held for PEG tube. Plan to restart heparin drip and switch to apixaban on May 5th. 4. Follow-up upon discharge with Dr. Anca Ron  in 2 weeks, Dr. Feldman Brochure at 3 months.       Ana Alfaro MD  Stroke, [facility]  [address] [facility]   Electronically signed 5/1/2020 at 12:14 PM

## 2020-05-01 NOTE — PROGRESS NOTES
Physical Therapy  DATE: 2020    NAME: Eric Marcus  MRN: [id number]   : 1950    Patient not seen this date for Physical Therapy due to:  [] Blood transfusion in progress  [] Hemodialysis  []  Patient Declined  [] Spine Precautions   [] Strict Bedrest  [x] Procedure  -  Peg placement. Will check on pt tomorrow. [] Testing      [] Other        [] PT being discontinued at this time. Patient independent. No further needs. [] PT being discontinued at this time as the patient has been transferred to palliative care. No further needs.     Ashleigh Cortes, PTA

## 2020-05-01 NOTE — ANESTHESIA POSTPROCEDURE EVALUATION
Department of Anesthesiology  Postprocedure Note    Patient: Nayeli Corona  MRN: [id number]  YOB: 1950  Date of evaluation: 5/1/2020  Time:  12:31 PM     Procedure Summary     Date:  05/01/20 Room / Location:  [address] [place] [address]    Anesthesia Start:  0820 Anesthesia Stop:  6320    Procedure:  ** CASE IN OR W/ GI STAFF - EGD ESOPHAGOGASTRODUODENOSCOPY PEG TUBE INSERTION (N/A ) Diagnosis:  (DYSPHAGIA)    Surgeon:  Luis Thacker DO Responsible Provider:  Sarah Grayson MD    Anesthesia Type:  MAC ASA Status:  4 - Emergent          Anesthesia Type: MAC    Nuvia Phase I: Nuvia Score: 9    Nuvia Phase II:      Last vitals: Reviewed and per EMR flowsheets.        Anesthesia Post Evaluation    Patient location during evaluation: PACU  Patient participation: complete - patient participated  Level of consciousness: awake  Pain score: 0  Nausea & Vomiting: no nausea  Cardiovascular status: hemodynamically stable  Respiratory status: room air  Hydration status: euvolemic

## 2020-05-01 NOTE — PROGRESS NOTES
General Surgery:  Daily Progress Note                PATIENT NAME: Gen Borden     TODAY'S DATE: 5/1/2020, 7:43 AM    SUBJECTIVE:     Pt seen and examined at bedside. Doing well. No acute issues overnight. Heparin drip off since 3 AM.     OBJECTIVE:   VITALS:  BP (!) 151/69   Pulse 82   Temp 98.2 °F (36.8 °C) (Oral)   Resp 15   Ht 5' 10\" (1.778 m)   Wt (!) 320 lb (145.2 kg)   SpO2 92%   BMI 45.92 kg/m²      INTAKE/OUTPUT:      Intake/Output Summary (Last 24 hours) at 5/1/2020 0743  Last data filed at 4/30/2020 2100  Gross per 24 hour   Intake 30 ml   Output 0 ml   Net 30 ml       PHYSICAL EXAM:  General Appearance: awake, alert, oriented, in no acute distress  HEENT:  Normocephalic, atraumatic, mucus membranes moist   Heart: Heart regular rate and rhythm  Lungs: no respiratory distress  Abdomen: Soft, non distended, non tender to palpation. Extremities: No cyanosis, pitting edema, rashes noted. Skin: Skin color, texture, turgor normal. No rashes or lesions.       Data:  CBC with Differential:    Lab Results   Component Value Date    WBC 11.6 05/01/2020    RBC 4.84 05/01/2020    HGB 13.8 05/01/2020    HCT 43.9 05/01/2020     05/01/2020    MCV 90.7 05/01/2020    MCH 28.5 05/01/2020    MCHC 31.4 05/01/2020    RDW 14.7 05/01/2020    LYMPHOPCT 9 05/01/2020    MONOPCT 9 05/01/2020    BASOPCT 1 05/01/2020    MONOSABS 1.00 05/01/2020    LYMPHSABS 1.07 05/01/2020    EOSABS 0.51 05/01/2020    BASOSABS 0.10 05/01/2020    DIFFTYPE NOT REPORTED 05/01/2020     BMP:    Lab Results   Component Value Date     05/01/2020    K 3.9 05/01/2020     05/01/2020    CO2 23 05/01/2020    BUN 24 05/01/2020    CREATININE 1.00 05/01/2020    CALCIUM 8.8 05/01/2020    GFRAA >60 05/01/2020    LABGLOM >60 05/01/2020    GLUCOSE 125 05/01/2020       Radiology Review:      ASSESSMENT:  Active Hospital Problems    Diagnosis Date Noted    [facility] [J69.0] 04/30/2020    Leukocytosis [D72.829]

## 2020-05-01 NOTE — DISCHARGE SUMMARY
[facility]     Department of Internal Medicine - Staff Internal Medicine Teaching Service    INPATIENT DISCHARGE SUMMARY      Patient Identification:  Deandre Roman is a 78 y.o. male. :  1950  MRN: [id number]     Acct: [de-identified]   PCP: Yarelis Sanchez MD  Admit Date:  2020  Discharge date and time: 5/3/2020  Attending Provider: No att. providers found                                     [address] Problem Lists:  Principal Problem:    Acute ischemic left MCA stroke [facility])  Active Problems:    Cerebrovascular accident (CVA) ([facility]    Bradycardia    Aspiration pneumonia ([facility]    Leukocytosis    Essential hypertension    Atrial fibrillation ([facility]    Chronic acquired lymphedema    Oropharyngeal dysphagia  Resolved Problems:    Arterial line in place    Hypotension    WILLIAM (acute kidney injury) [facility])      [address] course:     Deandre Roman is a 78 y.o. male with PMH of A. fib not on AC, stroke who was admitted to neuro ICU on 2020 for acute left MCA ischemic stroke, presented initially with complaints of aphasia, dysarthria, right-sided weakness and left-sided gaze. Patient was transferred out to medicine team when clinically stable. Patient was continued on full dose lovenox and discharge planning for acute rehab was done and patient discharged to acute rehab on 5/3/2020. Patient was managed as   Acute left MCA ischemic stroke with M2 MCA occlusion: Likely etiology cardioembolic in setting of A. fib.  POD #8 s/p emergent mechanical thrombectomy. Continue aspirin, Lipitor. transitioned to lovenox full dose. Continue until 2020 and then switch to oral AC. Repeat CT head prior to starting oral AC per neurology.     Aspiration pneumonia: Continue IV Zosyn. Stop date 2020 per ID.     Atrial fibrillation: Lopressor held for slow ventricular response. On Lovenox full dose.     Dysphagia: S/p peg tube placement. Tolerating feeds well.     COVID-19 tested negative.     WILLIAM: Resolved. Likely prerenal secondary to BP fluctuations. Procedures/ Significant Interventions:    Emergent mechanical thrombectomy on 4/25/2020    Consults:     Consults:     Final Specialist Recommendations/Findings:   IP CONSULT TO NEUROCRITICAL CARE  IP CONSULT TO INFECTIOUS DISEASES  IP CONSULT TO HOSPITALIST  IP CONSULT TO NEUROLOGY  IP CONSULT TO PHYSICAL MEDICINE REHAB  IP CONSULT TO CASE MANAGEMENT  IP CONSULT TO GENERAL SURGERY  IP CONSULT TO DIETITIAN  IP CONSULT TO CARDIOLOGY  IP CONSULT TO DIETITIAN      Any Hospital Acquired Infections: none    Discharge Functional Status:  stable    DISCHARGE PLAN     Disposition: [facility] acute rehab. Patient Instructions:   Current Discharge Medication List      START taking these medications    Details   amoxicillin-clavulanate (AUGMENTIN) 875-125 MG per tablet Take 1 tablet by mouth 2 times daily for 1 day  Qty: 3 tablet, Refills: 0             Activity: activity as tolerated    Diet: cardiac diet    Follow-up:    Colt Mccloud MD  [address]  [phone]    Schedule an appointment as soon as possible for a visit in 2 weeks  hospital follow up    Iliana Olsen MD  [address] [place] [address]  [facility] [address] [place] [address] [place] [address]    Schedule an appointment as soon as possible for a visit in 3 months  hospital follow up    Luli Irizarry, [address] [facility] [address]  [phone]    Schedule an appointment as soon as possible for a visit in 2 weeks      Danis Carlton MD  [address] [phone]            Patient Instructions:   Continue therapeutic Lovenox for A. fib until 5/5/2020. Repeat CT head on 5/5/2020. If no evidence of bleeding, can be switched to oral anticoagulation and discharged. Amlodipine can be started for blood pressure control.     Follow up labs: None    Follow up imaging: Repeat

## 2020-05-01 NOTE — OP NOTE
4:00 PM   Urine Color Yellow 4/26/2020  4:00 PM   Urine Appearance Clear 4/26/2020  4:00 PM   Output (mL) 200 mL 4/26/2020  5:00 PM       [REMOVED] External Urinary Catheter (Removed)   Urine Color Yellow 4/27/2020  5:00 AM   Urine Appearance Clear 4/27/2020  5:00 AM   Output (mL) 850 mL 4/27/2020  6:19 AM   Suction- Female Only 40 mmgHg continuous 4/27/2020  5:00 AM   Placement Initiated 4/27/2020  5:00 AM   Skin Assessment No Injury 4/27/2020  5:00 AM       Findings: Wound class II. 20 fr PEG and 3. 5 cm at the top of bumper    Indications: Is a 60-year-old male who is status post ischemic stroke with dysphasia. Patient failed a swallow study and the decision was made to take the patient to the operating room for a PEG tube placement. Consent: Procedure was explained in detail along with risk versus benefits and alternatives. All questions and concerns were addressed to patient and daughter and a formal written consent was obtained and placed the patient's chart. Detailed Description of Procedure:   Patient was brought to the operating room and remained on hospital bed in supine position. General anesthesia was induced by the anesthesia team.  A formal timeout identifying the patient, procedure, allergies, and antibiotics was performed. Patient is therapeutic on Zosyn prior to incision. Bite-block was placed and endoscope was inserted into the mouth, esophagus into the stomach where the stomach was insufflated. Scope was passed through the duodenum to confirm no distal obstruction. Scope was retracted and the abdomen was insufflated until the rugated were smooth. Positioning of PEG tube in the left upper quadrant was confirmed by red light reflex and digital indentation. Small 1 cm incision was made over the red reflex after the upper quadrant was prepped. The needle was inserted into the skin incision into the stomach under direct visualization.   Guidewire was passed through the needle and

## 2020-05-02 PROBLEM — R13.12 OROPHARYNGEAL DYSPHAGIA: Status: ACTIVE | Noted: 2020-04-30

## 2020-05-02 PROBLEM — I95.9 HYPOTENSION: Status: RESOLVED | Noted: 2020-04-30 | Resolved: 2020-05-02

## 2020-05-02 PROBLEM — N17.9 AKI (ACUTE KIDNEY INJURY) (HCC): Status: RESOLVED | Noted: 2020-04-30 | Resolved: 2020-05-02

## 2020-05-02 LAB
ABSOLUTE EOS #: 0.04 K/UL (ref 0–0.44)
ABSOLUTE IMMATURE GRANULOCYTE: 0.14 K/UL (ref 0–0.3)
ABSOLUTE LYMPH #: 1.23 K/UL (ref 1.1–3.7)
ABSOLUTE MONO #: 1.01 K/UL (ref 0.1–1.2)
ANION GAP SERPL CALCULATED.3IONS-SCNC: 13 MMOL/L (ref 9–17)
BASOPHILS # BLD: 0 % (ref 0–2)
BASOPHILS ABSOLUTE: 0.06 K/UL (ref 0–0.2)
BUN BLDV-MCNC: 33 MG/DL (ref 8–23)
BUN/CREAT BLD: ABNORMAL (ref 9–20)
CALCIUM SERPL-MCNC: 8.8 MG/DL (ref 8.6–10.4)
CHLORIDE BLD-SCNC: 105 MMOL/L (ref 98–107)
CO2: 27 MMOL/L (ref 20–31)
CREAT SERPL-MCNC: 1.2 MG/DL (ref 0.7–1.2)
DIFFERENTIAL TYPE: ABNORMAL
EOSINOPHILS RELATIVE PERCENT: 0 % (ref 1–4)
GFR AFRICAN AMERICAN: >60 ML/MIN
GFR NON-AFRICAN AMERICAN: >60 ML/MIN
GFR SERPL CREATININE-BSD FRML MDRD: ABNORMAL ML/MIN/{1.73_M2}
GFR SERPL CREATININE-BSD FRML MDRD: ABNORMAL ML/MIN/{1.73_M2}
GLUCOSE BLD-MCNC: 109 MG/DL (ref 75–110)
GLUCOSE BLD-MCNC: 113 MG/DL (ref 75–110)
GLUCOSE BLD-MCNC: 115 MG/DL (ref 75–110)
GLUCOSE BLD-MCNC: 127 MG/DL (ref 70–99)
HCT VFR BLD CALC: 40.1 % (ref 40.7–50.3)
HEMOGLOBIN: 12.6 G/DL (ref 13–17)
IMMATURE GRANULOCYTES: 1 %
LYMPHOCYTES # BLD: 9 % (ref 24–43)
MAGNESIUM: 2.6 MG/DL (ref 1.6–2.6)
MCH RBC QN AUTO: 29 PG (ref 25.2–33.5)
MCHC RBC AUTO-ENTMCNC: 31.4 G/DL (ref 28.4–34.8)
MCV RBC AUTO: 92.4 FL (ref 82.6–102.9)
MONOCYTES # BLD: 7 % (ref 3–12)
NRBC AUTOMATED: 0 PER 100 WBC
PARTIAL THROMBOPLASTIN TIME: 33.3 SEC (ref 20.5–30.5)
PDW BLD-RTO: 14.4 % (ref 11.8–14.4)
PLATELET # BLD: 231 K/UL (ref 138–453)
PLATELET ESTIMATE: ABNORMAL
PMV BLD AUTO: 11.5 FL (ref 8.1–13.5)
POTASSIUM SERPL-SCNC: 5 MMOL/L (ref 3.7–5.3)
RBC # BLD: 4.34 M/UL (ref 4.21–5.77)
RBC # BLD: ABNORMAL 10*6/UL
SEG NEUTROPHILS: 83 % (ref 36–65)
SEGMENTED NEUTROPHILS ABSOLUTE COUNT: 11.14 K/UL (ref 1.5–8.1)
SODIUM BLD-SCNC: 145 MMOL/L (ref 135–144)
WBC # BLD: 13.6 K/UL (ref 3.5–11.3)
WBC # BLD: ABNORMAL 10*3/UL

## 2020-05-02 PROCEDURE — 94660 CPAP INITIATION&MGMT: CPT

## 2020-05-02 PROCEDURE — 99232 SBSQ HOSP IP/OBS MODERATE 35: CPT | Performed by: INTERNAL MEDICINE

## 2020-05-02 PROCEDURE — 6360000002 HC RX W HCPCS: Performed by: ANESTHESIOLOGY

## 2020-05-02 PROCEDURE — 6360000002 HC RX W HCPCS: Performed by: STUDENT IN AN ORGANIZED HEALTH CARE EDUCATION/TRAINING PROGRAM

## 2020-05-02 PROCEDURE — 80048 BASIC METABOLIC PNL TOTAL CA: CPT

## 2020-05-02 PROCEDURE — 85025 COMPLETE CBC W/AUTO DIFF WBC: CPT

## 2020-05-02 PROCEDURE — 6370000000 HC RX 637 (ALT 250 FOR IP): Performed by: NURSE PRACTITIONER

## 2020-05-02 PROCEDURE — 6370000000 HC RX 637 (ALT 250 FOR IP): Performed by: STUDENT IN AN ORGANIZED HEALTH CARE EDUCATION/TRAINING PROGRAM

## 2020-05-02 PROCEDURE — 97530 THERAPEUTIC ACTIVITIES: CPT

## 2020-05-02 PROCEDURE — 85730 THROMBOPLASTIN TIME PARTIAL: CPT

## 2020-05-02 PROCEDURE — 36415 COLL VENOUS BLD VENIPUNCTURE: CPT

## 2020-05-02 PROCEDURE — 2580000003 HC RX 258: Performed by: STUDENT IN AN ORGANIZED HEALTH CARE EDUCATION/TRAINING PROGRAM

## 2020-05-02 PROCEDURE — 82947 ASSAY GLUCOSE BLOOD QUANT: CPT

## 2020-05-02 PROCEDURE — 2060000000 HC ICU INTERMEDIATE R&B

## 2020-05-02 PROCEDURE — 97110 THERAPEUTIC EXERCISES: CPT

## 2020-05-02 PROCEDURE — 97535 SELF CARE MNGMENT TRAINING: CPT | Performed by: OCCUPATIONAL THERAPIST

## 2020-05-02 PROCEDURE — 83735 ASSAY OF MAGNESIUM: CPT

## 2020-05-02 RX ADMIN — Medication 15 ML: at 09:17

## 2020-05-02 RX ADMIN — AMOXICILLIN AND CLAVULANATE POTASSIUM 1 TABLET: 875; 125 TABLET, FILM COATED ORAL at 09:17

## 2020-05-02 RX ADMIN — ENOXAPARIN SODIUM 150 MG: 150 INJECTION SUBCUTANEOUS at 21:10

## 2020-05-02 RX ADMIN — ASPIRIN 81 MG: 81 TABLET, CHEWABLE ORAL at 09:17

## 2020-05-02 RX ADMIN — AMOXICILLIN AND CLAVULANATE POTASSIUM 1 TABLET: 875; 125 TABLET, FILM COATED ORAL at 21:10

## 2020-05-02 RX ADMIN — FENTANYL CITRATE 25 MCG: 50 INJECTION, SOLUTION INTRAMUSCULAR; INTRAVENOUS at 21:43

## 2020-05-02 RX ADMIN — STANDARDIZED SENNA CONCENTRATE AND DOCUSATE SODIUM 2 TABLET: 8.6; 5 TABLET ORAL at 09:17

## 2020-05-02 RX ADMIN — ENOXAPARIN SODIUM 150 MG: 150 INJECTION SUBCUTANEOUS at 09:16

## 2020-05-02 RX ADMIN — DESMOPRESSIN ACETATE 40 MG: 0.2 TABLET ORAL at 21:10

## 2020-05-02 RX ADMIN — LISINOPRIL 10 MG: 10 TABLET ORAL at 09:17

## 2020-05-02 RX ADMIN — SODIUM CHLORIDE, PRESERVATIVE FREE 10 ML: 5 INJECTION INTRAVENOUS at 09:17

## 2020-05-02 RX ADMIN — SODIUM CHLORIDE, PRESERVATIVE FREE 10 ML: 5 INJECTION INTRAVENOUS at 21:48

## 2020-05-02 RX ADMIN — ONDANSETRON 4 MG: 2 INJECTION INTRAMUSCULAR; INTRAVENOUS at 21:43

## 2020-05-02 ASSESSMENT — PAIN - FUNCTIONAL ASSESSMENT
PAIN_FUNCTIONAL_ASSESSMENT: ACTIVITIES ARE NOT PREVENTED

## 2020-05-02 ASSESSMENT — PAIN DESCRIPTION - PAIN TYPE
TYPE: SURGICAL PAIN

## 2020-05-02 ASSESSMENT — PAIN DESCRIPTION - ONSET
ONSET: UNABLE TO TELL

## 2020-05-02 ASSESSMENT — PAIN SCALES - WONG BAKER
WONGBAKER_NUMERICALRESPONSE: 0

## 2020-05-02 ASSESSMENT — PAIN SCALES - GENERAL
PAINLEVEL_OUTOF10: 7
PAINLEVEL_OUTOF10: 8
PAINLEVEL_OUTOF10: 8
PAINLEVEL_OUTOF10: 7

## 2020-05-02 ASSESSMENT — PAIN DESCRIPTION - FREQUENCY
FREQUENCY: INTERMITTENT
FREQUENCY: INTERMITTENT
FREQUENCY: CONTINUOUS
FREQUENCY: INTERMITTENT

## 2020-05-02 ASSESSMENT — PAIN DESCRIPTION - PROGRESSION
CLINICAL_PROGRESSION: GRADUALLY WORSENING

## 2020-05-02 ASSESSMENT — PAIN DESCRIPTION - ORIENTATION
ORIENTATION: LEFT;UPPER

## 2020-05-02 ASSESSMENT — PAIN DESCRIPTION - LOCATION
LOCATION: ABDOMEN

## 2020-05-02 ASSESSMENT — PAIN DESCRIPTION - DESCRIPTORS
DESCRIPTORS: ACHING;SORE

## 2020-05-02 NOTE — CONSULTS
PEG Tube, Daily  atorvastatin (LIPITOR) tablet 40 mg, 40 mg, PEG Tube, Nightly  enoxaparin (LOVENOX) injection 150 mg, 1 mg/kg, Subcutaneous, BID  amoxicillin-clavulanate (AUGMENTIN) 875-125 MG per tablet 1 tablet, 1 tablet, Oral, 2 times per day  albuterol (PROVENTIL) nebulizer solution 2.5 mg, 2.5 mg, Nebulization, As Directed RT PRN  mineral oil-hydrophilic petrolatum (AQUAPHOR) ointment, , Topical, BID PRN  lisinopril (PRINIVIL;ZESTRIL) tablet 10 mg, 10 mg, Oral, Daily  sennosides-docusate sodium (SENOKOT-S) 8.6-50 MG tablet 2 tablet, 2 tablet, Oral, Daily  chlorhexidine (PERIDEX) 0.12 % solution 15 mL, 15 mL, Mouth/Throat, BID  atropine injection 0.5 mg, 0.5 mg, Intravenous, PRN  sodium chloride flush 0.9 % injection 10 mL, 10 mL, Intravenous, 2 times per day  sodium chloride flush 0.9 % injection 10 mL, 10 mL, Intravenous, PRN  acetaminophen (TYLENOL) tablet 650 mg, 650 mg, Oral, Q6H PRN **OR** acetaminophen (TYLENOL) suppository 650 mg, 650 mg, Rectal, Q6H PRN  promethazine (PHENERGAN) tablet 12.5 mg, 12.5 mg, Oral, Q6H PRN **OR** ondansetron (ZOFRAN) injection 4 mg, 4 mg, Intravenous, Q6H PRN  senna (SENOKOT) tablet 8.6 mg, 1 tablet, Oral, Daily PRN  glucose (GLUTOSE) 40 % oral gel 15 g, 15 g, Oral, PRN  dextrose 50 % IV solution, 12.5 g, Intravenous, PRN  glucagon (rDNA) injection 1 mg, 1 mg, Intramuscular, PRN  dextrose 5 % solution, 100 mL/hr, Intravenous, PRN  hydrALAZINE (APRESOLINE) injection 10 mg, 10 mg, Intravenous, Q6H PRN    Allergies:  Patient has no known allergies. Social History:        Family History: family history is not on file. No h/o sudden cardiac death. No for premature CAD    REVIEW OF SYSTEMS:    · Constitutional: there has been no unanticipated weight loss. There's been No change in energy level, No change in activity level. · Eyes: No visual changes or diplopia. No scleral icterus.   · ENT: No Headaches  · Cardiovascular: Remaining as above  · Respiratory: No previous (acute kidney injury) ([facility]        IMPRESSION:    1. Atrial fibrillation with tachybrady syndrome: Predominantly slow ventricular response  2. Left MCA stroke s/p emergent embolectomy  3. Dysphagia s/p PEG tube placement on 5/1/2020  4. Hypertension  5. Diabetes  6. WILLIAM    RECOMMENDATIONS:  1. On Anticoagulation with Lovenox. Can switch to Eliquis 5 mg bid at discharge  2. Avoid beta-blockers  3. If heart rate drops too low or if there is evidence of low blood pressure, can initiate low-dose dopamine drip  4. 48-hour Holter monitor at discharge            Discussed with patient and Nurse. Kristen Pelaez M.D. Fellow, [address]        Attestation signed by      Attending Physician Statement:    I have discussed the care of  Paula Chavez , including pertinent history and exam findings, with the Cardiology fellow/resident. I have seen and examined the patient and the key elements of all parts of the encounter have been performed by me. I agree with the assessment, plan and orders as documented by the fellow/resident, after I modified exam findings and plan of treatments, and the final version is my approved version of the assessment. Additional Comments:   AF with Dinodaniele David while on BB  Acute CVA  Dysphagia- s/p PEG  HTN  DM  - stop BB  - continue AC-change to eliquis on d/c  - needs 48 hour holter to rule out SSS/Tachy-Saul    Discussed with patient and nursing. Thank you for allowing me to participate in the care of this patient, please do not hesitate to call if you have any questions. Tova Page DO, [facility], [facility]  [email or web address]  [phone]

## 2020-05-02 NOTE — PROGRESS NOTES
Physical Therapy  Facility/Department: [facility] NEURO  Daily Treatment Note  NAME: Hunter Byrd  : 1950  MRN: [id number]    Date of Service: 2020    Discharge Recommendations:  Further therapy recommended at discharge and the patient should be able to tolerate at least 3 hours per day over 5 days or 15 hours over 7 days. PT Equipment Recommendations  Equipment Needed: No    Assessment   Body structures, Functions, Activity limitations: Decreased functional mobility ; Decreased strength;Decreased endurance;Decreased sensation;Decreased balance  Assessment: Pt required MaxA for bed mobility and ModA to stand with Florence Climes. Pt demonstrates willing to participate and motivation to improve,  will require continued intensive PT after DC to return to prior level of function. Prognosis: Good  PT Education: Transfer Training;General Safety; Functional Mobility Training;PT Role  Barriers to Learning: Aphasia  REQUIRES PT FOLLOW UP: Yes  Activity Tolerance  Activity Tolerance: Patient limited by endurance; Patient Tolerated treatment well     Patient Diagnosis(es): The primary encounter diagnosis was Cerebrovascular accident (CVA), unspecified mechanism ([address] A diagnosis of Cerebrovascular accident (CVA) due to thrombosis of left middle cerebral artery ([address] was also pertinent to this visit. has a past medical history of Arterial ischemic stroke, MCA (middle cerebral artery), left, acute ([address] Atrial fibrillation ([address] and Stroke ([address] has a past surgical history that includes Gastrostomy tube placement (2020). Restrictions  Restrictions/Precautions  Restrictions/Precautions: Fall Risk, Up as Tolerated  Required Braces or Orthoses?: No  Subjective   General  Chart Reviewed: Yes  Response To Previous Treatment: Patient with no complaints from previous session. Family / Caregiver Present: No  Subjective  Subjective: Pt and RN agreeable to PT.  Pt alert in bed upon arrival, pleasant and cooperative t/o treatment. Expressive aphasia   General Comment  Comments: Pt left in bed with call light within reach, alarm activated  Pain Screening  Patient Currently in Pain: Denies  Vital Signs  Patient Currently in Pain: Denies       Orientation  Orientation  Overall Orientation Status: (unable to fully assess secondary to expressive aphasia)  Orientation Level: Oriented to situation;Oriented to person  Cognition      Objective   Bed mobility  Supine to Sit: Maximum assistance  Sit to Supine: Moderate assistance  Scooting: Maximal assistance  Comment: HOB elevated, pt able to assist with L UE and L LE with increased use of R LE from previous date  Transfers  Sit to Stand: Moderate Assistance  Stand to sit: Moderate Assistance  Comment: with umang lora, pt demonstrating improved tolerance to standing from previous date. Emphasis on TKE of R LE while standing  Ambulation  Ambulation?: No     Balance  Posture: Fair  Sitting - Static: Fair;+  Sitting - Dynamic: Fair  Standing - Static: Fair;-  Standing - Dynamic: Fair;-  Comments: pt remained EOB ~15mins with Minda progressing to CGA t/o for R lateral lean, standing balance assessed in umang lora. Pt maintained standing ~10 mins with lateral weight shifts, emphasis on TKE with R quad weakness noted  Exercises  Straight Leg Raise: 15x  AROM L LE, AAROM R LE  Quad Sets: 15x  AROM L LE, AAROM R LE  Heelslides: 15x  AROM L LE, AAROM R LE  Gluteal Sets: 10x  Hip Flexion: 15x  AROM L LE, AAROM R LE  Hip Abduction: 10x   Long Arc Quad: 15x  AROM L LE, AAROM R LE  Ankle Pumps: 15x  AROM L LE, AAROM R LE  Core strengthening: EOB ~15mins     Goals  Short term goals  Time Frame for Short term goals: 14 visits  Short term goal 1: Pt to perform bed mobility SBA  Short term goal 2: Improve sitting dynamic balance to Good  Short term goal 3: Pt to transfer CGA. Short term goal 4: Pt to ambulate with appropriate device 150 ft Min A.   Short term goal 5: Pt to tolerate 45 minutes of activity

## 2020-05-02 NOTE — PLAN OF CARE
Problem: HEMODYNAMIC STATUS  Goal: Patient has stable vital signs and fluid balance  5/2/2020 1121 by Luis Antonio Delong RN  Outcome: Ongoing  5/2/2020 0607 by Kevin Reed RN  Outcome: Ongoing     Problem: ACTIVITY INTOLERANCE/IMPAIRED MOBILITY  Goal: Mobility/activity is maintained at optimum level for patient  5/2/2020 1121 by Luis Antonio Delong RN  Outcome: Ongoing  5/2/2020 0607 by eKvin Reed RN  Outcome: Ongoing     Problem: COMMUNICATION IMPAIRMENT  Goal: Ability to express needs and understand communication  5/2/2020 1121 by Luis Antonio Delong RN  Outcome: Ongoing  5/2/2020 0607 by Kevin Reed RN  Outcome: Ongoing     Problem: Falls - Risk of:  Goal: Will remain free from falls  Description: Will remain free from falls  5/2/2020 1121 by Luis Antonio Delong RN  Outcome: Ongoing  5/2/2020 0607 by Kevin Reed RN  Outcome: Ongoing  Goal: Absence of physical injury  Description: Absence of physical injury  5/2/2020 1121 by Luis Antonio Delong RN  Outcome: Ongoing  5/2/2020 0607 by Kevin Reed RN  Outcome: Ongoing     Problem: Nutrition  Goal: Optimal nutrition therapy  5/2/2020 1121 by Luis Antonio Delong RN  Outcome: Ongoing  5/2/2020 0607 by Kevin Reed RN  Outcome: Ongoing     Problem: Pain:  Goal: Pain level will decrease  Description: Pain level will decrease  5/2/2020 1121 by Luis Antonio Delong RN  Outcome: Ongoing  5/2/2020 0607 by Kevin Reed RN  Outcome: Ongoing  Goal: Control of acute pain  Description: Control of acute pain  5/2/2020 1121 by Luis Antonio Delong RN  Outcome: Ongoing  5/2/2020 0607 by Kevin Reed RN  Outcome: Ongoing  Goal: Control of chronic pain  Description: Control of chronic pain  5/2/2020 1121 by Luis Antonio Delong RN  Outcome: Ongoing  5/2/2020 0607 by Kevin Reed RN  Outcome: Ongoing   Discussed POC with pt.

## 2020-05-02 NOTE — PROGRESS NOTES
General Surgery:  Daily Progress Note                PATIENT NAME: Cj Alvarez     TODAY'S DATE: 5/2/2020, 6:47 AM    SUBJECTIVE:     Pt seen and examined at bedside. Doing well. Minimal pain at G tube site. No acute issues overnight. Heparin drip restarted    OBJECTIVE:   VITALS:  BP (!) 144/68   Pulse (!) 48   Temp 97.4 °F (36.3 °C) (Oral)   Resp 14   Ht 5' 10\" (1.778 m) Comment: FROM FLOOR  Wt (!) 320 lb (145.2 kg) Comment: FROM FLOOR  SpO2 96%   BMI 45.92 kg/m²      INTAKE/OUTPUT:      Intake/Output Summary (Last 24 hours) at 5/2/2020 8496  Last data filed at 5/2/2020 0400  Gross per 24 hour   Intake 280 ml   Output 100 ml   Net 180 ml       PHYSICAL EXAM:  General Appearance: awake, alert, oriented, aphasic  HEENT:  Normocephalic, atraumatic, mucus membranes moist   Heart: Heart regular rate and rhythm  Lungs: no respiratory distress  Abdomen: Soft, non distended, non tender to palpation. G tube in place 3.5 cm at the top of bumper. Extremities: No cyanosis, pitting edema, rashes noted. Skin: Skin color, texture, turgor normal. No rashes or lesions.       Data:  CBC with Differential:    Lab Results   Component Value Date    WBC 13.6 05/02/2020    RBC 4.34 05/02/2020    HGB 12.6 05/02/2020    HCT 40.1 05/02/2020     05/02/2020    MCV 92.4 05/02/2020    MCH 29.0 05/02/2020    MCHC 31.4 05/02/2020    RDW 14.4 05/02/2020    LYMPHOPCT 9 05/02/2020    MONOPCT 7 05/02/2020    BASOPCT 0 05/02/2020    MONOSABS 1.01 05/02/2020    LYMPHSABS 1.23 05/02/2020    EOSABS 0.04 05/02/2020    BASOSABS 0.06 05/02/2020    DIFFTYPE NOT REPORTED 05/02/2020     BMP:    Lab Results   Component Value Date     05/02/2020    K 5.0 05/02/2020     05/02/2020    CO2 27 05/02/2020    BUN 33 05/02/2020    CREATININE 1.20 05/02/2020    CALCIUM 8.8 05/02/2020    GFRAA >60 05/02/2020    LABGLOM >60 05/02/2020    GLUCOSE 127 05/02/2020       Radiology Review:      ASSESSMENT:  Active Hospital Problems    Diagnosis

## 2020-05-02 NOTE — PROGRESS NOTES
[facility]  Internal Medicine Teaching Residency Program  Inpatient Daily Progress Note  ______________________________________________________________________________    Patient: Onur Laguna  YOB: 1950   PJ    Acct: [de-identified]     Room: [facility]5675-73  Admit date: 2020  Today's date: 20  Number of days in the hospital: 7    SUBJECTIVE   Admitting Diagnosis: Acute ischemic left MCA stroke ([address]  CC: Right-sided weakness and left-sided gaze, dysarthria    Pt examined at bedside. Chart & results reviewed. Afebrile  Vitals stable  On 4 lpm of oxygen via nasal cannula and is saturating well  Hypernatremia 145  Potassium 5.0    Worsening leucocytosis 13.6 this am    s/p POD #7 mechanical thrombectomy which was done on 2020.     Patient seen and examined bedside. Labs and chart reviewed.     PEG tube placement done on 20 by general surgery today. Tube feeds going well. Patient tolerating it well    Neuro- continue aspirin, lipitor, discharge on full dose lovenox till . Repeat CT Head on , if stable then start eliquis. ID following- last dose of zosyn today for aspiration pneumonia      ROS:  Constitutional:  negative for chills, fevers, sweats  Respiratory:  negative for cough, dyspnea on exertion, hemoptysis, shortness of breath, wheezing  Cardiovascular:  negative for chest pain, chest pressure/discomfort, lower extremity edema, palpitations  Gastrointestinal:  negative for abdominal pain, constipation, diarrhea, nausea, vomiting  Neurological:  negative for dizziness, headache    BRIEF HISTORY     Mk Lauren a 72-year-old male with PMH of A. fib not on AC, stroke who was admitted to neuro ICU on 2020 for acute left MCA ischemic stroke, presented initially with complaints of aphasia, dysarthria, right-sided weakness and left-sided gaze.      CT head was unremarkable.   CTA head and neck showed occlusion of TRIG 80 04/25/2020     LIVER PROFILE: No results for input(s): AST, ALT, ALB, BILIDIR, BILITOT, ALKPHOS in the last 72 hours. MICROBIOLOGY:   Lab Results   Component Value Date/Time    CULTURE NO GROWTH 4 DAYS 04/28/2020 12:12 PM    CULTURE NO GROWTH 4 DAYS 04/28/2020 12:12 PM       Imaging:    Xr Abdomen (kub) (single Ap View)    Result Date: 4/25/2020  1. No acute abnormality. Ct Head Wo Contrast    Result Date: 4/28/2020  Left middle cerebral artery infarct with scattered subarachnoid hyperdense material representing hemorrhage versus contrast staining and this is not significantly changed compared to prior. Ct Head Wo Contrast    Result Date: 4/27/2020  Progression of the left middle cerebral artery infarct with scattered subarachnoid hyperdense material representing mild hemorrhage versus contrast staining and attention on short-term follow-up is recommended. Xr Chest Portable    Result Date: 4/28/2020  Overall, no significant change in chest findings given differences and inspiration. No focal lung consolidation is seen to suggest pneumonia. Xr Chest Portable    Result Date: 4/27/2020  Again identified is cardiomegaly with vascular congestion, though minimally improved from the previous examination. No focal consolidation. Xr Chest Portable    Result Date: 4/26/2020  Similar vascular congestion/edema. Xr Chest Portable    Result Date: 4/25/2020  Enteric tube terminates in the expected location of the stomach with the side port is near the GE junction. Consider advancing about 5 cm for better position. Cardiomegaly and pulmonary vascular congestion may be magnified in part by shallow inspiration but an element of volume overload is considered. Probable splenomegaly. Xr Chest Portable    Result Date: 4/25/2020  1. Cardiomegaly.      Xr Abdomen For Ng/og/ne Tube Placement    Result Date: 4/25/2020  Enteric tube terminates in the expected location of the stomach with the side port etiology cardioembolic in setting of A. fib.  POD #7 s/p emergent mechanical thrombectomy. Continue aspirin, Lipitor. transitioned to lovenox full dose. continue until 5/5/2020 and then switch to oral AC. Repeat CT head prior to starting oral AC per neurology. 2. Aspiration pneumonia: Continue IV Zosyn. Stop date 5/2/2020 per ID. 3. Atrial fibrillation: Continue Lopressor 12.5 Mg oral twice daily with parameters. on lovenox full dose  4. Dysphagia:s/p peg tube placement. Tolerating feeds well. 5. COVID-19 tested negative. 6. Leukocytosis: 13.6 this am, likely reactive  7. Bradycardia: Status post 1 dose of 0.5 mg atropine. Resolved. 8. Hypertension: s/p clevidipine drip briefly.  Goal -170. Lisinopril held by neurology. SBP today 140s. 9. Hypotension: s/p Transient pressor support. Currently off pressors. 10. WILLIAM: Likely prerenal secondary to BP fluctuations. Creatinine peaked to 1.60. Resolved. Creatinine back to normal.  11. Bilateral lower extremity chronic lymphedema with lichenoid changes: Wound care on board.     DVT prophylaxis: Lovenox full dose  GI prophylaxis: None indicated. Diet: via peg tube.     PT/OT/SW: PT and OT consulted. Disposition: Possible discharge tomorrow. Case management consulted to assist with discharge planning. Patient will require acute rehab at [facility]. Stuart Snachez MD  PGY- 1   Internal Medicine Resident  [address] [facility]  5/2/2020 9:57 AM      Attestation and add on       I have discussed the care of Janis Ladd , including pertinent history and exam findings,    5/2/20   with the resident. I have seen and examined the patient and the key elements of all parts of the encounter have been performed by me . I agree with the assessment, plan and orders as documented by the resident.      Principal Problem:    Acute ischemic left MCA stroke [facility])  Active Problems:    Cerebrovascular accident (CVA) ([facility]    Bradycardia    Arterial

## 2020-05-02 NOTE — PROGRESS NOTES
[id number]    Specimen Description . BLOOD    Special Requests 10ML    Culture NO GROWTH 2 DAYS   MRSA DNA Probe, Nasal [[id number]] Collected: 04/28/20 1728   Order Status: Completed Specimen: Nasal Updated: 04/29/20 1607    Specimen Description . NASAL SWAB    MRSA, DNA, Nasal NEGATIVE:  MRSA DNA not detected by nucleic acid amplification. Culture, Urine [[id number]] Collected: 04/26/20 0745   Order Status: Completed Specimen: Urine, clean catch Updated: 04/27/20 0740    Specimen Description . CLEAN CATCH URINE    Special Requests NOT REPORTED    Culture NO GROWTH   MRSA DNA Probe, Nasal [[id number]] Collected: 04/25/20 1342   Order Status: Completed Specimen: Nasal Updated: 04/26/20 1407    Specimen Description . NASAL SWAB    MRSA, DNA, Nasal NEGATIVE:  MRSA DNA not detected by nucleic acid amplification. Comment:                                                    Results should be used as an adjunct to nosocomial control efforts to identify patients   needing enhanced precautions.     The test is not intended to identify patients with staphylococcal infections.  Results   should not be used to guide or monitor treatment for MRSA infections. Medications:      aspirin  81 mg PEG Tube Daily    atorvastatin  40 mg PEG Tube Nightly    enoxaparin  1 mg/kg Subcutaneous BID    amoxicillin-clavulanate  1 tablet Oral 2 times per day    lisinopril  10 mg Oral Daily    sennosides-docusate sodium  2 tablet Oral Daily    chlorhexidine  15 mL Mouth/Throat BID    sodium chloride flush  10 mL Intravenous 2 times per day             Electronically signed by Frida Nelson MD on 5/2/2020 at 11:50 AM  Infectious Diseases Resident  [address] [place] [address]:    I have discussed the case, including pertinent history and exam findings with the residents and students. I have seen and examined the patient and the key elements of the encounter have been performed by me.  I was present when the student

## 2020-05-03 ENCOUNTER — HOSPITAL ENCOUNTER (INPATIENT)
Age: 70
LOS: 20 days | Discharge: NURSING, REHAB OR LONG-TERM CARE | DRG: 056 | End: 2020-05-23
Attending: PHYSICAL MEDICINE & REHABILITATION | Admitting: PHYSICAL MEDICINE & REHABILITATION
Payer: MEDICARE

## 2020-05-03 VITALS
SYSTOLIC BLOOD PRESSURE: 161 MMHG | WEIGHT: 315 LBS | HEART RATE: 77 BPM | OXYGEN SATURATION: 95 % | BODY MASS INDEX: 45.1 KG/M2 | TEMPERATURE: 96.8 F | HEIGHT: 70 IN | DIASTOLIC BLOOD PRESSURE: 86 MMHG | RESPIRATION RATE: 18 BRPM

## 2020-05-03 PROBLEM — I63.9 ACUTE CVA (CEREBROVASCULAR ACCIDENT) (HCC): Status: ACTIVE | Noted: 2020-05-03

## 2020-05-03 LAB
ABSOLUTE EOS #: 0.24 K/UL (ref 0–0.44)
ABSOLUTE IMMATURE GRANULOCYTE: 0.13 K/UL (ref 0–0.3)
ABSOLUTE LYMPH #: 1.44 K/UL (ref 1.1–3.7)
ABSOLUTE MONO #: 0.87 K/UL (ref 0.1–1.2)
ANION GAP SERPL CALCULATED.3IONS-SCNC: 13 MMOL/L (ref 9–17)
BASOPHILS # BLD: 1 % (ref 0–2)
BASOPHILS ABSOLUTE: 0.11 K/UL (ref 0–0.2)
BUN BLDV-MCNC: 35 MG/DL (ref 8–23)
BUN/CREAT BLD: ABNORMAL (ref 9–20)
CALCIUM SERPL-MCNC: 9 MG/DL (ref 8.6–10.4)
CHLORIDE BLD-SCNC: 106 MMOL/L (ref 98–107)
CO2: 26 MMOL/L (ref 20–31)
CREAT SERPL-MCNC: 0.99 MG/DL (ref 0.7–1.2)
DIFFERENTIAL TYPE: ABNORMAL
EOSINOPHILS RELATIVE PERCENT: 2 % (ref 1–4)
GFR AFRICAN AMERICAN: >60 ML/MIN
GFR NON-AFRICAN AMERICAN: >60 ML/MIN
GFR SERPL CREATININE-BSD FRML MDRD: ABNORMAL ML/MIN/{1.73_M2}
GFR SERPL CREATININE-BSD FRML MDRD: ABNORMAL ML/MIN/{1.73_M2}
GLUCOSE BLD-MCNC: 154 MG/DL (ref 70–99)
HCT VFR BLD CALC: 44.2 % (ref 40.7–50.3)
HEMOGLOBIN: 13.8 G/DL (ref 13–17)
IMMATURE GRANULOCYTES: 1 %
LYMPHOCYTES # BLD: 11 % (ref 24–43)
MAGNESIUM: 2.6 MG/DL (ref 1.6–2.6)
MCH RBC QN AUTO: 29.2 PG (ref 25.2–33.5)
MCHC RBC AUTO-ENTMCNC: 31.2 G/DL (ref 28.4–34.8)
MCV RBC AUTO: 93.4 FL (ref 82.6–102.9)
MONOCYTES # BLD: 7 % (ref 3–12)
NRBC AUTOMATED: 0 PER 100 WBC
PARTIAL THROMBOPLASTIN TIME: 31.5 SEC (ref 20.5–30.5)
PDW BLD-RTO: 14.6 % (ref 11.8–14.4)
PLATELET # BLD: 254 K/UL (ref 138–453)
PLATELET ESTIMATE: ABNORMAL
PMV BLD AUTO: 12 FL (ref 8.1–13.5)
POTASSIUM SERPL-SCNC: 4 MMOL/L (ref 3.7–5.3)
RBC # BLD: 4.73 M/UL (ref 4.21–5.77)
RBC # BLD: ABNORMAL 10*6/UL
SEG NEUTROPHILS: 78 % (ref 36–65)
SEGMENTED NEUTROPHILS ABSOLUTE COUNT: 9.85 K/UL (ref 1.5–8.1)
SODIUM BLD-SCNC: 145 MMOL/L (ref 135–144)
WBC # BLD: 12.6 K/UL (ref 3.5–11.3)
WBC # BLD: ABNORMAL 10*3/UL

## 2020-05-03 PROCEDURE — 2580000003 HC RX 258: Performed by: STUDENT IN AN ORGANIZED HEALTH CARE EDUCATION/TRAINING PROGRAM

## 2020-05-03 PROCEDURE — 6370000000 HC RX 637 (ALT 250 FOR IP): Performed by: STUDENT IN AN ORGANIZED HEALTH CARE EDUCATION/TRAINING PROGRAM

## 2020-05-03 PROCEDURE — 6370000000 HC RX 637 (ALT 250 FOR IP): Performed by: NURSE PRACTITIONER

## 2020-05-03 PROCEDURE — 80048 BASIC METABOLIC PNL TOTAL CA: CPT

## 2020-05-03 PROCEDURE — 6360000002 HC RX W HCPCS: Performed by: STUDENT IN AN ORGANIZED HEALTH CARE EDUCATION/TRAINING PROGRAM

## 2020-05-03 PROCEDURE — 83735 ASSAY OF MAGNESIUM: CPT

## 2020-05-03 PROCEDURE — 85730 THROMBOPLASTIN TIME PARTIAL: CPT

## 2020-05-03 PROCEDURE — 1180000000 HC REHAB R&B

## 2020-05-03 PROCEDURE — 99239 HOSP IP/OBS DSCHRG MGMT >30: CPT | Performed by: INTERNAL MEDICINE

## 2020-05-03 PROCEDURE — 36415 COLL VENOUS BLD VENIPUNCTURE: CPT

## 2020-05-03 PROCEDURE — 85025 COMPLETE CBC W/AUTO DIFF WBC: CPT

## 2020-05-03 PROCEDURE — 99232 SBSQ HOSP IP/OBS MODERATE 35: CPT | Performed by: INTERNAL MEDICINE

## 2020-05-03 RX ORDER — ATORVASTATIN CALCIUM 40 MG/1
40 TABLET, FILM COATED ORAL NIGHTLY
Qty: 30 TABLET | Refills: 3 | Status: SHIPPED | OUTPATIENT
Start: 2020-05-03

## 2020-05-03 RX ORDER — AMLODIPINE BESYLATE 2.5 MG/1
2.5 TABLET ORAL DAILY
Status: DISCONTINUED | OUTPATIENT
Start: 2020-05-03 | End: 2020-05-03

## 2020-05-03 RX ORDER — ALBUTEROL SULFATE 2.5 MG/3ML
2.5 SOLUTION RESPIRATORY (INHALATION)
Status: DISCONTINUED | OUTPATIENT
Start: 2020-05-03 | End: 2020-05-23 | Stop reason: HOSPADM

## 2020-05-03 RX ORDER — ASPIRIN 81 MG/1
81 TABLET, CHEWABLE ORAL DAILY
Qty: 30 TABLET | Refills: 3 | Status: SHIPPED | OUTPATIENT
Start: 2020-05-03

## 2020-05-03 RX ORDER — ASPIRIN 81 MG/1
81 TABLET, CHEWABLE ORAL DAILY
Status: DISCONTINUED | OUTPATIENT
Start: 2020-05-04 | End: 2020-05-23 | Stop reason: HOSPADM

## 2020-05-03 RX ORDER — ACETAMINOPHEN 325 MG/1
650 TABLET ORAL EVERY 4 HOURS PRN
Status: DISCONTINUED | OUTPATIENT
Start: 2020-05-03 | End: 2020-05-12

## 2020-05-03 RX ORDER — LISINOPRIL 10 MG/1
10 TABLET ORAL DAILY
Qty: 30 TABLET | Refills: 3 | Status: SHIPPED | OUTPATIENT
Start: 2020-05-03

## 2020-05-03 RX ORDER — LISINOPRIL 20 MG/1
20 TABLET ORAL DAILY
Status: DISCONTINUED | OUTPATIENT
Start: 2020-05-04 | End: 2020-05-03 | Stop reason: HOSPADM

## 2020-05-03 RX ORDER — AMOXICILLIN AND CLAVULANATE POTASSIUM 875; 125 MG/1; MG/1
1 TABLET, FILM COATED ORAL EVERY 12 HOURS SCHEDULED
Status: DISPENSED | OUTPATIENT
Start: 2020-05-03 | End: 2020-05-05

## 2020-05-03 RX ORDER — POLYETHYLENE GLYCOL 3350 17 G/17G
17 POWDER, FOR SOLUTION ORAL DAILY PRN
Status: DISCONTINUED | OUTPATIENT
Start: 2020-05-03 | End: 2020-05-23 | Stop reason: HOSPADM

## 2020-05-03 RX ORDER — ATORVASTATIN CALCIUM 40 MG/1
40 TABLET, FILM COATED ORAL NIGHTLY
Status: DISCONTINUED | OUTPATIENT
Start: 2020-05-03 | End: 2020-05-23 | Stop reason: HOSPADM

## 2020-05-03 RX ORDER — METOPROLOL SUCCINATE 25 MG/1
12.5 TABLET, EXTENDED RELEASE ORAL DAILY
Status: DISCONTINUED | OUTPATIENT
Start: 2020-05-03 | End: 2020-05-03 | Stop reason: HOSPADM

## 2020-05-03 RX ADMIN — STANDARDIZED SENNA CONCENTRATE AND DOCUSATE SODIUM 2 TABLET: 8.6; 5 TABLET ORAL at 09:18

## 2020-05-03 RX ADMIN — LISINOPRIL 10 MG: 10 TABLET ORAL at 09:18

## 2020-05-03 RX ADMIN — SODIUM CHLORIDE, PRESERVATIVE FREE 10 ML: 5 INJECTION INTRAVENOUS at 09:23

## 2020-05-03 RX ADMIN — ENOXAPARIN SODIUM 150 MG: 150 INJECTION SUBCUTANEOUS at 22:18

## 2020-05-03 RX ADMIN — ASPIRIN 81 MG: 81 TABLET, CHEWABLE ORAL at 09:16

## 2020-05-03 RX ADMIN — ATORVASTATIN CALCIUM 40 MG: 40 TABLET, FILM COATED ORAL at 22:18

## 2020-05-03 RX ADMIN — ENOXAPARIN SODIUM 150 MG: 150 INJECTION SUBCUTANEOUS at 09:17

## 2020-05-03 ASSESSMENT — PAIN SCALES - WONG BAKER
WONGBAKER_NUMERICALRESPONSE: 0

## 2020-05-03 ASSESSMENT — PAIN DESCRIPTION - PROGRESSION
CLINICAL_PROGRESSION: GRADUALLY WORSENING

## 2020-05-03 ASSESSMENT — PAIN SCALES - GENERAL
PAINLEVEL_OUTOF10: 0
PAINLEVEL_OUTOF10: 0

## 2020-05-03 NOTE — PROGRESS NOTES
[facility]   Department of Internal Medicine - Staff Internal Medicine Teaching Service        Inpatient Daily Progress Note    Date: 5/3/2020  Patient Name: Eric Marcus  MRN: [id number]  Acct: [de-identified]   Date of Admission: 2020  7:40 AM  YOB: 1950  Primary Care Physician: No primary care provider on file. Attending Physician: Anna Toney MD   Room: [address] [place] [address]  Number of days in the hospital: 8    Subjective   Admitting Diagnosis: Acute ischemic left MCA stroke [facility])  Chief Complaint:   Chief Complaint   Patient presents with    Cerebrovascular Accident     Pt was seen and examined at bedside. Resting comfortably in the bed. Vitals stable. Labs reviewed. No acute events overnight. Patient to be discharged to Acute rehab today    Review Of Systems:  Unable to obtain. Patient nonverbal    Objective   Vital Signs:  /69   Pulse 92   Temp 97.6 °F (36.4 °C) (Oral)   Resp 16   Ht 5' 10\" (1.778 m) Comment: FROM FLOOR  Wt (!) 320 lb (145.2 kg) Comment: FROM FLOOR  SpO2 93%   BMI 45.92 kg/m²     Temp (24hrs), Av.4 °F (36.3 °C), Min:97 °F (36.1 °C), Max:97.6 °F (36.4 °C)    In: 30   Out: -   Physical Exam -  Physical Exam  Vitals signs reviewed. Constitutional:       General: He is not in acute distress. Appearance: Normal appearance. He is normal weight. He is not ill-appearing, toxic-appearing or diaphoretic. HENT:      Head: Normocephalic and atraumatic. Mouth/Throat:      Mouth: Mucous membranes are moist.   Eyes:      Extraocular Movements: Extraocular movements intact. Pupils: Pupils are equal, round, and reactive to light. Neck:      Musculoskeletal: Normal range of motion and neck supple. No neck rigidity. Cardiovascular:      Rate and Rhythm: Normal rate and regular rhythm. Heart sounds: Normal heart sounds. No murmur.    Pulmonary:      Effort: Pulmonary effort is normal. No respiratory distress. Breath sounds: No wheezing or rhonchi. Abdominal:      General: Bowel sounds are normal. There is no distension. Palpations: Abdomen is soft. Comments: PEG tube in place   Musculoskeletal: Normal range of motion. General: No deformity. Skin:     General: Skin is warm and dry. Findings: Lesion (lichenomatous skin b/l LE) present. Neurological:      General: No focal deficit present. Mental Status: He is alert.        Medications:  Scheduled Medications:    aspirin  81 mg PEG Tube Daily    atorvastatin  40 mg PEG Tube Nightly    enoxaparin  1 mg/kg Subcutaneous BID    lisinopril  10 mg Oral Daily    sennosides-docusate sodium  2 tablet Oral Daily    chlorhexidine  15 mL Mouth/Throat BID    sodium chloride flush  10 mL Intravenous 2 times per day     Continuous Infusions:    dextrose       PRN MedicationsfentanNYL, 25 mcg, Q5 Min PRN  albuterol, 2.5 mg, As Directed RT PRN  mineral oil-hydrophilic petrolatum, , BID PRN  atropine, 0.5 mg, PRN  sodium chloride flush, 10 mL, PRN  acetaminophen, 650 mg, Q6H PRN    Or  acetaminophen, 650 mg, Q6H PRN  promethazine, 12.5 mg, Q6H PRN    Or  ondansetron, 4 mg, Q6H PRN  senna, 1 tablet, Daily PRN  glucose, 15 g, PRN  dextrose, 12.5 g, PRN  glucagon (rDNA), 1 mg, PRN  dextrose, 100 mL/hr, PRN  hydrALAZINE, 10 mg, Q6H PRN      Diagnostic Labs:  CBC:   Recent Labs     05/01/20  0556 05/02/20  0358 05/03/20  0607   WBC 11.6* 13.6* 12.6*   RBC 4.84 4.34 4.73   HGB 13.8 12.6* 13.8   HCT 43.9 40.1* 44.2   MCV 90.7 92.4 93.4   RDW 14.7* 14.4 14.6*    231 254     BMP:   Recent Labs     05/01/20  0556 05/02/20  0358 05/03/20  0607    145* 145*   K 3.9 5.0 4.0    105 106   CO2 23 27 26   BUN 24* 33* 35*   CREATININE 1.00 1.20 0.99     ASSESSMENT & PLAN   Principal Problem:    Acute ischemic left MCA stroke (HCC)  Active Problems:    Cerebrovascular accident (CVA) (H[facility])    Bradycardia    Aspiration pneumonia (HCC)

## 2020-05-03 NOTE — PROGRESS NOTES
IVSd:1.19 cm(0.6 - 1.1 cm)       Aortic Root:3.9 cm(2.0 - 3.7 cm)   LVPWd:1.21 cm(0.6 - 1.1 cm)      LA Dimension: 4.8 cm(1.9 - 4.0 cm)   Fractional Shortenin.19 %    LA volume/Index: 108.4 ml /43m^2   Calculated LVEF (%): 63.15 %     LVOT:2.2 cm                                    RVDd:3.69 cm      Mitral:                                 Aortic      Valve Area (P1/2-Time): 3.24 cm^2       Peak Velocity: 1.54 m/s   Peak E-Wave: 0.93 m/s                   Mean Velocity: 1.16 m/s                                           Peak Gradient: 9.49 mmHg   Peak Gradient: 3.46 mmHg                Mean Gradient: 6 mmHg   Mean Gradient: 1 mmHg   Deceleration Time: 261 msec   P1/2t: 68 msec                          Area (continuity): 2.16 cm^2                                           AV VTI: 28.2 cm      Area (continuity): 2.79 cm^2   Mean Velocity: 0.51 m/s      Tricuspid:                              Pulmonic:      Estimated RVSP: 39 mmHg                 Peak Velocity: 1.03 m/s   Peak TR Velocity: 2.72 m/s              Peak Gradient: 4.24 mmHg   Peak TR Gradient: 29.5936 mmHg   Estimated RA Pressure: 10 mmHg                                              Estimated PASP: 39.59 mmHg     Diastology / Tissue Doppler  Septal Wall E' velocity:0.06 m/s  Septal Wall E/E':17  Lateral Wall E' velocity:0.09 m/s  Lateral Wall E/E':10       Assessment / Plan:     AF with Brittani Course  Acute CVA  Dysphagia- s/p PEG  HTN  DM  - had some tachy, and PVCs  - will add small dose Toprol  - continue AC-change to eliquis on d/c  - needs 48 hour holter to rule out SSS/Tachy-Saul    Thank you for allowing me to participate in the care of this patient, please do not hesitate to call if you have any questions. Katharina Sharma DO, [facility] [place], [address] [facility]  [email or web address]  [phone]

## 2020-05-04 LAB
ANION GAP SERPL CALCULATED.3IONS-SCNC: 12 MMOL/L (ref 9–17)
BUN BLDV-MCNC: 32 MG/DL (ref 8–23)
BUN/CREAT BLD: ABNORMAL (ref 9–20)
CALCIUM SERPL-MCNC: 9.1 MG/DL (ref 8.6–10.4)
CHLORIDE BLD-SCNC: 109 MMOL/L (ref 98–107)
CO2: 26 MMOL/L (ref 20–31)
CREAT SERPL-MCNC: 0.86 MG/DL (ref 0.7–1.2)
CULTURE: NORMAL
CULTURE: NORMAL
GFR AFRICAN AMERICAN: >60 ML/MIN
GFR NON-AFRICAN AMERICAN: >60 ML/MIN
GFR SERPL CREATININE-BSD FRML MDRD: ABNORMAL ML/MIN/{1.73_M2}
GFR SERPL CREATININE-BSD FRML MDRD: ABNORMAL ML/MIN/{1.73_M2}
GLUCOSE BLD-MCNC: 110 MG/DL (ref 75–110)
GLUCOSE BLD-MCNC: 120 MG/DL (ref 75–110)
GLUCOSE BLD-MCNC: 146 MG/DL (ref 70–99)
HCT VFR BLD CALC: 40.6 % (ref 41–53)
HEMOGLOBIN: 13.6 G/DL (ref 13.5–17.5)
Lab: NORMAL
Lab: NORMAL
MCH RBC QN AUTO: 29.5 PG (ref 26–34)
MCHC RBC AUTO-ENTMCNC: 33.4 G/DL (ref 31–37)
MCV RBC AUTO: 88.4 FL (ref 80–100)
NRBC AUTOMATED: ABNORMAL PER 100 WBC
PDW BLD-RTO: 15.1 % (ref 11.5–14.9)
PLATELET # BLD: 217 K/UL (ref 150–450)
PMV BLD AUTO: 9.3 FL (ref 6–12)
POTASSIUM SERPL-SCNC: 4.4 MMOL/L (ref 3.7–5.3)
RBC # BLD: 4.59 M/UL (ref 4.5–5.9)
SODIUM BLD-SCNC: 147 MMOL/L (ref 135–144)
SPECIMEN DESCRIPTION: NORMAL
SPECIMEN DESCRIPTION: NORMAL
WBC # BLD: 9 K/UL (ref 3.5–11)

## 2020-05-04 PROCEDURE — 99223 1ST HOSP IP/OBS HIGH 75: CPT | Performed by: PSYCHIATRY & NEUROLOGY

## 2020-05-04 PROCEDURE — 97110 THERAPEUTIC EXERCISES: CPT

## 2020-05-04 PROCEDURE — 99223 1ST HOSP IP/OBS HIGH 75: CPT | Performed by: PHYSICAL MEDICINE & REHABILITATION

## 2020-05-04 PROCEDURE — 85027 COMPLETE CBC AUTOMATED: CPT

## 2020-05-04 PROCEDURE — 92507 TX SP LANG VOICE COMM INDIV: CPT

## 2020-05-04 PROCEDURE — 97112 NEUROMUSCULAR REEDUCATION: CPT

## 2020-05-04 PROCEDURE — 1180000000 HC REHAB R&B

## 2020-05-04 PROCEDURE — 97166 OT EVAL MOD COMPLEX 45 MIN: CPT

## 2020-05-04 PROCEDURE — 82947 ASSAY GLUCOSE BLOOD QUANT: CPT

## 2020-05-04 PROCEDURE — 6370000000 HC RX 637 (ALT 250 FOR IP): Performed by: STUDENT IN AN ORGANIZED HEALTH CARE EDUCATION/TRAINING PROGRAM

## 2020-05-04 PROCEDURE — 80048 BASIC METABOLIC PNL TOTAL CA: CPT

## 2020-05-04 PROCEDURE — 99222 1ST HOSP IP/OBS MODERATE 55: CPT | Performed by: INTERNAL MEDICINE

## 2020-05-04 PROCEDURE — 92523 SPEECH SOUND LANG COMPREHEN: CPT

## 2020-05-04 PROCEDURE — 6360000002 HC RX W HCPCS: Performed by: STUDENT IN AN ORGANIZED HEALTH CARE EDUCATION/TRAINING PROGRAM

## 2020-05-04 PROCEDURE — 97535 SELF CARE MNGMENT TRAINING: CPT

## 2020-05-04 PROCEDURE — 36415 COLL VENOUS BLD VENIPUNCTURE: CPT

## 2020-05-04 RX ADMIN — ASPIRIN 81 MG 81 MG: 81 TABLET ORAL at 10:06

## 2020-05-04 RX ADMIN — ENOXAPARIN SODIUM 150 MG: 150 INJECTION SUBCUTANEOUS at 10:06

## 2020-05-04 RX ADMIN — ATORVASTATIN CALCIUM 40 MG: 40 TABLET, FILM COATED ORAL at 20:41

## 2020-05-04 RX ADMIN — AMOXICILLIN AND CLAVULANATE POTASSIUM 1 TABLET: 875; 125 TABLET, FILM COATED ORAL at 10:06

## 2020-05-04 RX ADMIN — ENOXAPARIN SODIUM 150 MG: 150 INJECTION SUBCUTANEOUS at 20:41

## 2020-05-04 RX ADMIN — AMOXICILLIN AND CLAVULANATE POTASSIUM 1 TABLET: 875; 125 TABLET, FILM COATED ORAL at 20:41

## 2020-05-04 ASSESSMENT — 9 HOLE PEG TEST
TEST_RESULT: FUNCTIONAL
TESTTIME_SECONDS: 28
TEST_RESULT: NOT TESTED

## 2020-05-04 ASSESSMENT — PAIN SCALES - WONG BAKER: WONGBAKER_NUMERICALRESPONSE: 0

## 2020-05-04 ASSESSMENT — PAIN DESCRIPTION - PROGRESSION: CLINICAL_PROGRESSION: GRADUALLY WORSENING

## 2020-05-04 NOTE — PROGRESS NOTES
Physical Therapy  [address]  Acute Rehabilitation Physical Therapy Evaluation     Date: 20  Patient Name: Robert Schmid       Room: 3062/6471-46  MRN: [id number]   Account: [de-identified]   : 1950  (78 y.o.)   Gender: male   Referring Practitioner: Dr. Caremn Castellanos  Diagnosis:  L MCA ischemic stroke, s/p emergent mechanical thrombectomy 20    Treatment Diagnosis: Impaired fucntion. Additional Pertinent Hx: Admitted 20 for R side weakness and aphasia. has emergent mechanical thrombectomy 20. On 20 pt had EGD with PEG tube inserted, admitted to rehab on 5/3/20. Restrictions/Precautions: Fall Risk;Up as Tolerated(PEG Tube)  Required Braces or Orthoses?: No        Past Medical History:  has a past medical history of Arterial ischemic stroke, MCA (middle cerebral artery), left, acute ([address] Atrial fibrillation ([address] and Stroke ([address] Past Surgical History:   has a past surgical history that includes Gastrostomy tube placement (2020) and Upper gastrointestinal endoscopy (N/A, 2020). Vital Signs  BP Location: Left Arm  Level of Consciousness: Alert  Patient Currently in Pain: Denies   Pain Screening  Patient Currently in Pain: Denies  Pain Assessment  Ellington-Funez Pain Rating: No hurt  Clinical Progression: Gradually worsening     Oxygen Therapy  O2 Device: None (Room air)    Patient Observation  Observations: Dry scaly skin B LE. Lives With: Family(Daughter?)  Type of Home: Mobile home  Home Layout: One level  Home Access: Stairs to enter without rails  Entrance Stairs - Number of Steps: 5  ADL Assistance: Independent  Homemaking Assistance: Independent  Ambulation Assistance: Independent  Transfer Assistance: Independent  Additional Comments: Information from case management note. Pt with expressive aphasia. (Pt attempted to answer questions but got frustrated.)  Overall Orientation Status: (unable to fully assess secondary to expressive aphasia)  Orientation Level: Oriented to situation, Oriented to person  Vision  Vision: Impaired  Vision Exceptions: Wears glasses at all times  Hearing  Hearing: Within functional limits          Objective:   PROM RLE (degrees)  RLE PROM: WFL  AROM LLE (degrees)  LLE AROM : WFL  Strength RLE  Comment: Hip extension 1+/5, Trace Hip flexors, Knee extensors 1/5, no knee flexors felt, ankle 0/5   PROM LUE (degrees)  LUE PROM: WFL  Strength RUE  Strength RUE: WFL  Strength LUE  Comment: 0/5              Gross Motor?: (Decreased LUE)  Overall Sensation Status: Impaired  Light Touch: Severe deficits in the LUE  Additional Comments: Pt denies any sensation in LUE distal from shoulder    Bed Mobility:   Bed mobility  Rolling to Left: Dependent/Total  Rolling to Right: Moderate assistance(with bed rail.)  Supine to Sit: Moderate assistance, 2 Person assistance  Sit to Supine: 2 Person assistance, Moderate assistance  Scooting: Moderate assistance  Comment: Pt able to maintain sitting static balance at [facility] at EOB/EOM. Bed Mobility  Scooting: Moderate assistance    Transfers:  Sit to Stand: Moderate Assistance, 2 Person Assistance, Minimal Assistance(Each time performance improves, multiple sit to stand. )  Stand to sit: Moderate Assistance  Bed to Chair: Dependent/Total(umang lora)  Comment: with umang lora, pt demonstrating improved tolerance to standing from previous date. Emphasis on TKE of R LE while standing                                                                           Balance  Posture: Fair  Sitting - Static: Fair;+  Sitting - Dynamic: Fair;-  Standing - Static: Fair;-  Standing - Dynamic: Poor  Comments: pt remained EOB ~15mins with Minda progressing to CGA t/o for R lateral lean, standing balance assessed in umang lora.  Pt maintained standing ~10 mins with lateral weight shifts, emphasis on TKE with R quad weakness noted     Other exercises?: Yes  Other exercises 1: Multiple sit <> stand from W/C, mod A + Maintaining Posture SUBTOTAL     2/15    Changing a Posture  6. Supine to Paretic Side Lateral  Examiner:  Begin with the subject in supine on a treatment mat. Instruct the subject to roll to the paretic side (lateral movement). Assist as necessary. Evaluated the subject's performance on the amount of help required. Do not consider the quality of performance. 1  Can perform with much help   7. Supine to Non-paretic Side Lateral  Examiner:  Begin with the subject in supine on a treatment mat. Instruct the subject to roll to the non-paretic side (lateral movement). Assist as necessary. Evaluate the subject's Performance on the amount of help required. Do not consider the quality of performance. 1  Can perform with much help   8. Supine to Sitting up on the Edge of the Mat   Examiner:  Begin with the subject in supine on a treatment mat. Instruct the subject to come to sitting on the edge of the mat. Assist as necessary. Evaluate the subject's performance on the amount of help required. Do not considered the quality performance. 1  Can perform with much help    9. Sitting on the Edge of the Mat to Supine  Examiner: Begin with the subject sitting on the edge of a treatment mat. Instruct the subject to return to supine. Assist as necessary. Evaluate the subjects performance on the amount of help required. Co not consider the quality of performance. 1  Can perform with much help   10. Sitting to Standing Up  Examiner:  Begin with the subject sitting on the edge of a treatment mat. Instruct the subject to stand up without support. Assist if necessary. Evaluated the subject's performance on the amount ot help required. Do not consider the quality of the performance. 1  Can perform with much help   11. Standing Up to Sitting Down  Examiner:  Begin with the subject standing by the edge of a treatment mat. Instruct the subject to sit on the edge of mat without support.   Assist if

## 2020-05-04 NOTE — PROGRESS NOTES
Peg Test Time (secs): 28  Right 9-Hole Peg Test: Not Tested    Fine Motor Skills  Coordination  Movements Are Fluid And Coordinated: No  Coordination and Movement description: Fine motor impairments, Gross motor impairments, Right UE(RUE is flaccid)     Mobility  Supine to Sit: Moderate assistance, 2 Person assistance  Sit to Supine: Moderate assistance, 2 Person assistance       Balance  Sitting Balance: Stand by assistance(SBA/CGA; cues to shift weight to L side)  Standing Balance: Moderate assistance(static standing in SS)     Bed mobility  Rolling to Left: Dependent/Total  Rolling to Right: Moderate assistance  Supine to Sit: Moderate assistance;2 Person assistance  Sit to Supine: Moderate assistance;2 Person assistance  Scooting: Moderate assistance  Comment: Pt left up in w/c at end of session. Transfers  Sit to stand: Moderate assistance, 2 Person assistance  Stand to sit: Moderate assistance(x1 with 2nd person present for safety)  Transfer Comments: w/Angélica Echeverria; in PM pt received in room, falling asleep in w/c, leaning over R side. Pt returned to bed with use of SS - stood with Mod A x1 in PM with second A for safety. Wheelchair Bed Transfers  Equipment Used: [employer], Wheelchair  Level of Asssistance: 2 Person assistance  Wheelchair Transfers Comments: Megan Hugo  Functional Activity Tolerance  Functional Activity Tolerance: Tolerates 10 - 20 min exercise with multiple rests   Activity Tolerance: Patient Tolerated treatment well    Exercises  Grasp/Release: OT gave pt pink therapy sponge and placed it in R hand, encouraged pt to make attempts to squeeze with R hand. Pt unable to complete grasp but verbalized understanding. of purpose of exercise    ADL  ADL  Equipment Provided: Dressing stick(Used in PM to doff socks w/Min A)  Feeding: NPO(Tube feed)  Grooming: Moderate assistance  UE Bathing:  Moderate assistance  LE Bathing: Maximum assistance  UE Dressing: None(No appropriate clothing available - donned gown.)  LE Dressing: Dependent/Total(TA for socks, pt did attempt at EOB but unable)  Toileting: Dependent/Total(Incontinent of urine - TA for hygiene/brief change)  Additional Comments: Pt received in supine with soiled brief. Pt attempted to complete ashlyn-hygiene but needed A for thoroughness. Pt required TA for brief change and hygiene of buttocks. Pt T/F'd to w/c and taken to BR for UB bathing/grooming tasks. Pt washed face with set-up A. Pt is endentulous and refused mouthwash so oral care not assessed. Pt able to brush L side of hair but required A for front/back/R side. Pt washed chest/abdomen with set-up A. Pt required OT to support RUE for bathing and A for thoroughness of R shoulder/back of upper arm. Pt required TA to wash LUE. Pt's personal clothing soiled and no appropriate size clothing available so pt left in gown this morning. OT asked RN to speak with family about bringing in more clothing. OT scores   Eating  Comment: NPO - feeding tube  Reason if not Attempted: Not attempted due to medical condition or safety concerns  CARE Score: 88  Discharge Goal: Set-up or clean-up assistance(If swallowing improves for PO intake)  Oral Hygiene  Comment: Pt is endentulous and refused mouthwash. Reason if not Attempted: Patient refused  CARE Score: 7  Discharge Goal: Set-up or clean-up assistance  Toileting Hygiene  Assistance Needed: Dependent  Comment: A for all tasks  CARE Score: 1  Discharge Goal: Partial/moderate assistance  Shower/Bathe Self  Assistance Needed: Substantial/maximal assistance  CARE Score: 2  Discharge Goal: Partial/moderate assistance  Upper Body Dressing  Comment: No appropriate clothing available. Reason if not Attempted: Not attempted due to environmental limitations  CARE Score: 10  Discharge Goal: Set-up or clean-up assistance  Lower Body Dressing  Comment: No appropriate clothing available.   Reason if not Attempted: Not attempted due to environmental

## 2020-05-04 NOTE — PLAN OF CARE
Problem: Falls - Risk of:  Goal: Will remain free from falls  Description: Will remain free from falls  5/4/2020 1522 by Cindy Guevara RN  Outcome: Ongoing  5/4/2020 0542 by Joycelyn Russo RN  Outcome: Met This Shift  Goal: Absence of physical injury  Description: Absence of physical injury  Outcome: Ongoing     Problem:  Activity:  Goal: Ability to tolerate increased activity will improve  Description: Ability to tolerate increased activity will improve  Outcome: Ongoing     Problem: Skin Integrity:  Goal: Will show no infection signs and symptoms  Description: Will show no infection signs and symptoms  Outcome: Ongoing  Goal: Absence of new skin breakdown  Description: Absence of new skin breakdown  5/4/2020 1522 by Cindy Guevara RN  Outcome: Ongoing  5/4/2020 0542 by Joycelyn Russo RN  Outcome: Met This Shift

## 2020-05-04 NOTE — PROGRESS NOTES
blanca Shaikh, ewing. \"  Pt. is NPO c PEG d/t MBS results from 04/27- Pt presents with severe dysphagia characterized by +penetration and +gross aspiration with puree, and +penetration with no clearance with honey thick liquid. Pt with severely decreased swallow initiation with puree consistency, spillage to pyriform. Pt currently with NG tube, may be affecting swallow initiation. ST is recommending NPO, meds to be administered via NG. Repeat MBS not recommended at this time d/t pt. continues to clear throat often throughout evaluation and pt. c significant R sided facial weakness. Treatment of speech and swallow recommended to improve these areas to a functional level. Recommendations:  Requires SLP Intervention: Yes             Plan:   Goals:  Short-term Goals  Goal 1: Increase speech production to a functional level. Goal 2: Improve oral motor strength and ROM via oral motor exercises. Goal 3: Repeat MBS when appropriate, as improvement is noted. Goal 4: Increase two step directions to a functional level.     Patient/family involved in developing goals and treatment plan: family not present    Subjective:   Previous level of function and limitations:         Vision  Vision: Impaired  Vision Exceptions: Wears glasses at all times  Hearing  Hearing: Within functional limits           Objective:     Oral/Motor  Oral Motor: Exceptions to WFL(Pt. demonstrated groping with lingual directions d/t oral apraxia. )  Labial ROM: Reduced right  Labial Symmetry: Abnormal symmetry right  Labial Coordination: Reduced  Lingual Symmetry: Abnormal symmetry right  Lingual Strength: Reduced  Lingual Coordination: Reduced    Auditory Comprehension  Comprehension: Exceptions  Yes/No Questions: (complex sentences- 90%)  One Step Basic Commands: Mild(80%)  Two Step Basic Commands: Severe(33%)         Expression  Primary Mode of Expression: Verbal    Verbal Expression  Verbal Expression: Exceptions to functional limits(Pt.

## 2020-05-04 NOTE — PROGRESS NOTES
[]?Airborne     []? VRE     []? MRSA        []? C-diff         []? TB                  []? Other:                     Physiatrist:  [x]?      []? Dr. Luis Bean  []? Dr. Kaleb De La Cruz     Patients Occupation:  Unknown  Reviewed Lab and Diagnostic reports from Current Admission: Yes     Patients Prior Functional  Level: Prior Function  ADL Assistance: Independent  Homemaking Assistance: Independent  Ambulation Assistance: Independent  Transfer Assistance: Independent  Additional Comments: Information from case management note. Pt with expressive aphasia.     History of current illness:  53-year-old male with history of CVA and A. fib not on anticoagulation presented with right-sided weakness facial droop and left fourth gaze palsy as well as aphasia.  CT head unremarkable, [facility] M2 acute thrombus.  Underwent mechanical thrombectomy.      Neuro- MRI showed infarct left insular cortex and left frontotemporal region and small area of acute infarct left parieto-occipital region.  He was started on heparin. .  TTE showed 65% ejection fraction, no thrombus or negative bubble. Due to some hemorrhage post thrombectomy will need approximately 10 days from stroke to assess with head CT and then start p.o. Eliquis.   (425/25-5/ 5/20)     ID- seen for possible aspiration pneumonia- currently on Zosyn vancomycin discontinued     Neuro interventionalist- EKG showed A. fib started on Lopressor, 4/28 fever possible aspiration- see ID above, severe dysarthria and weak cough will likely require PEG prior to discharge.  Heparin infusion for A. fib.  Long-term anticoagulation start May 5 with CT head prior to starting     Internal medicine- left MCA CVA episode of bradycardia down to 28 received 0.5 mg for pain proved.  Blood pressure labile.  Transient support with Levophed.  VA likely embolic bilateral lower extremity lichen chronicus-podiatry and wound care

## 2020-05-04 NOTE — PROGRESS NOTES
Physical Therapy  Facility/Department: [facility] ACUTE REHAB  Daily Treatment Note  NAME: Hanna Stanford  : 1950  MRN: [id number]    Date of Service: 2020    Discharge Recommendations:  Patient would benefit from continued therapy after discharge, 24 hour supervision or assist, Home with Home health PT   PT Equipment Recommendations  Equipment Needed: (TBD)    Assessment   Body structures, Functions, Activity limitations: Decreased functional mobility ; Decreased strength;Decreased endurance;Decreased sensation;Decreased balance  Treatment Diagnosis: Impaired fucntion. Barriers to Learning: Aphasia  REQUIRES PT FOLLOW UP: Yes  Activity Tolerance  Activity Tolerance: Patient Tolerated treatment well     Patient Diagnosis(es): There were no encounter diagnoses. has a past medical history of Arterial ischemic stroke, MCA (middle cerebral artery), left, acute ([address] Atrial fibrillation ([facility] and Stroke ([facility] has a past surgical history that includes Gastrostomy tube placement (2020) and Upper gastrointestinal endoscopy (N/A, 2020). Restrictions  Restrictions/Precautions  Restrictions/Precautions: Fall Risk, Up as Tolerated(PEG Tube)  Required Braces or Orthoses?: No  Subjective   General  Chart Reviewed: Yes  Additional Pertinent Hx: Admitted 20 for R side weakness and aphasia. has emergent mechanical thrombectomy 20. On 20 pt had EGD with PEG tube inserted, admitted to rehab on 5/3/20. Family / Caregiver Present: No  Subjective  Subjective: Patient motivated to improve. Pain Screening  Patient Currently in Pain: Denies  Vital Signs  Patient Currently in Pain: Denies       Orientation  Orientation  Overall Orientation Status: (unable to fully assess secondary to expressive aphasia)  Objective   Bed mobility  Rolling to Left: Dependent/Total  Rolling to Right: Moderate assistance(with bed rail)  Supine to Sit: Moderate assistance;2 Person assistance  Sit to Supine:  Moderate

## 2020-05-04 NOTE — H&P
has no known allergies. Medications   Scheduled Meds:   amoxicillin-clavulanate  1 tablet Oral 2 times per day    aspirin  81 mg PEG Tube Daily    atorvastatin  40 mg PEG Tube Nightly    enoxaparin  1 mg/kg Subcutaneous BID     Continuous Infusions:  PRN Meds:.albuterol, acetaminophen, polyethylene glycol     Diagnostics:     · Head CT WO (4/25/2020)  Impression   1. Motion partially limits evaluation. 2. No convincing acute intracranial abnormality. 3. There is question of a small chronic infarct involving the left parietal   lobe. Findings were acute acute to Dr. Fredy Gold on 4/25/2020 at 7:27 am.          · CTA H/N (4/25/2020)  Impression   1. Occlusion involving a proximal left M2 branch at the level of the left   sylvian fissure.  There appears to be delayed enhancement of the distal   branches. 2. There is question of blurring of the gray-white differentiation involving   the left insular cortex as well as the left frontal lobe near the vertex. 3. No focal stenosis otherwise seen of the [id number]. 4. No flow limiting stenosis of the cervical carotid/vertebral arteries. Findings were communicated to Dr. Fredy Gold on 4/25/2020 at 8:36 am.          · Angiogram (4/25/2020)  Impression:   1. Left MCA M1 proximal clot resulting in acute ischemic stroke due to cardioembolic event treated with 3 passes of mechanical thrombectomy using Solitaire 4 x 40 mm and a large bore reperfusion catheter resulted in tici 2c with persistent distal end at    C6 occlusion that is not amenable to mechanical thrombectomy.      · Right Lower Extremity Arterial Doppler (4/25/2020)  Summary        No evidence of significant occlusive arterial disease in the right lower    extremity.  Technically limited visualization in the right groin due to    fem-stop      · Brain MRI WO (4/25/2020)  Impression   Areas of acute infarct in the left insular cortex and left frontotemporal   region and a smaller area of acute infarct in the left parieto-occipital   region consistent with left middle cerebral artery territory infarcts.       Mild cerebral atrophy.  Chronic small vessel ischemic changes.       I  · Head CT WO (repeated 4/28/2020)  Impression   Left middle cerebral artery infarct with scattered subarachnoid hyperdense   material representing hemorrhage versus contrast staining and this is not   significantly changed compared to prior.          · TTE (4/28/2020)  Summary  Left ventricle is normal in size with normal systolic function globally. Calculated ejection fraction is 63%. mild to moderate concentric left ventricular hypertrophy. Evidence of moderate diastolic dysfunction. Left atrium is moderately dilated. Mildly dilated right atrium. Mildly dilated right ventricle with normal function. Mild mitral regurgitation. Mild tricuspid regurgitation. Estimated right ventricular systolic pressure  is 39 mmHg. Bubble study was negative      CBC:   Recent Labs     05/02/20  0358 05/03/20  0607 05/04/20  0758   WBC 13.6* 12.6* 9.0   RBC 4.34 4.73 4.59   HGB 12.6* 13.8 13.6   HCT 40.1* 44.2 40.6*   MCV 92.4 93.4 88.4   RDW 14.4 14.6* 15.1*    254 217     BMP:   Recent Labs     05/02/20  0358 05/03/20  0607 05/04/20  0758   * 145* 147*   K 5.0 4.0 4.4    106 109*   CO2 27 26 26   BUN 33* 35* 32*   CREATININE 1.20 0.99 0.86   GLUCOSE 127* 154* 146*     HbA1c:   Lab Results   Component Value Date    LABA1C 5.6 04/25/2020     BNP: No results for input(s): BNP in the last 72 hours. PT/INR: No results for input(s): PROTIME, INR in the last 72 hours. APTT:   Recent Labs     05/02/20  0358 05/03/20  0607   APTT 33.3* 31.5*     CARDIAC ENZYMES: No results for input(s): CKMB, CKMBINDEX, TROPONINT in the last 72 hours.     Invalid input(s): CKTOTAL;3  FASTING LIPID PANEL:  Lab Results   Component Value Date    CHOL 111 04/25/2020    HDL 46 04/25/2020    TRIG 80 04/25/2020     LIVER PROFILE: No results for input(s): AST, GEN: Well developed, well nourished, in NAD  HEENT:  NCAT. PERRL. EOMI. Mucous membranes pink and moist.   PULM:  Clear to ausculation. No rales or rhonchi. Respirations WNL and unlabored. CV:  Regular rate rhythm. No murmurs or gallops. GI:  Abdomen soft. Obese, Nontender. Non-distended. BS + and equal.    NEUROLOGICAL: Alert and interactive. Following commands. Unable to determine orientation status. Sensation intact to light touch. DTRs 2+. Impaired R CN VII.   MSK:  Functional ROM LUE and LLE, Flaccid RUE and RLE. . Motor testing 4+/5 key muscles LUE and 4/5 key muscles LLE. R shoulder 1/5, R arm/hand 0/5. R leg/hip muscles 0/5. SKIN: Warm dry and intact. Good turgor except distal BLE chronic skin changes and dryness. EXTREMITIES:  No calf tenderness to palpation. Chronic edema distal BLEs. Sachin Huang PSYCH: Mood WNL. Appropriately interactive. Affect WNL. Principal Diagnosis/plan:  The patient is a 78y.o. year old with ADL and Mobility deficits secondary to ischemic CVA with R hemiparesis, aphasia, and dysphagia. He will require close medical monitoring for the comorbidities listed below. He will benefit from intensive interdisciplinary therapies and rehab nursing care and is appropriate for inpatient rehabilitation. The post admission physician evaluation (IKER) is consistent with the pre-admission assessment. See above findings to reflect the elements required in the IKER. Patient's admitting condition is consistent with the findings of the preadmission assessment by the rehabilitation admissions coordinator. Diagnoses/plan:    1. Ischemic CVA with R hemiparesis:  PT/OT for gait, mobility, strengthening, endurance, ADLs, and self care. On atorvastatin, ASA. 2. Aphasia/dysphagia: speech to eval and treat. Severe expressive aphasia. Poor fluency, comprehension intact/following commands, unable to repeat. Consistent with Broca's. Tube feed via PEG.  Dietitian to transition to

## 2020-05-05 ENCOUNTER — APPOINTMENT (OUTPATIENT)
Dept: CT IMAGING | Age: 70
DRG: 056 | End: 2020-05-05
Attending: PHYSICAL MEDICINE & REHABILITATION
Payer: MEDICARE

## 2020-05-05 LAB
GLUCOSE BLD-MCNC: 110 MG/DL (ref 75–110)
GLUCOSE BLD-MCNC: 120 MG/DL (ref 75–110)
GLUCOSE BLD-MCNC: 132 MG/DL (ref 75–110)
GLUCOSE BLD-MCNC: 167 MG/DL (ref 75–110)

## 2020-05-05 PROCEDURE — 6370000000 HC RX 637 (ALT 250 FOR IP): Performed by: STUDENT IN AN ORGANIZED HEALTH CARE EDUCATION/TRAINING PROGRAM

## 2020-05-05 PROCEDURE — 92507 TX SP LANG VOICE COMM INDIV: CPT

## 2020-05-05 PROCEDURE — 99232 SBSQ HOSP IP/OBS MODERATE 35: CPT | Performed by: PHYSICAL MEDICINE & REHABILITATION

## 2020-05-05 PROCEDURE — 6370000000 HC RX 637 (ALT 250 FOR IP): Performed by: PSYCHIATRY & NEUROLOGY

## 2020-05-05 PROCEDURE — 1180000000 HC REHAB R&B

## 2020-05-05 PROCEDURE — 82947 ASSAY GLUCOSE BLOOD QUANT: CPT

## 2020-05-05 PROCEDURE — 6360000002 HC RX W HCPCS: Performed by: STUDENT IN AN ORGANIZED HEALTH CARE EDUCATION/TRAINING PROGRAM

## 2020-05-05 PROCEDURE — 99213 OFFICE O/P EST LOW 20 MIN: CPT

## 2020-05-05 PROCEDURE — 6370000000 HC RX 637 (ALT 250 FOR IP): Performed by: INTERNAL MEDICINE

## 2020-05-05 PROCEDURE — 99232 SBSQ HOSP IP/OBS MODERATE 35: CPT | Performed by: PSYCHIATRY & NEUROLOGY

## 2020-05-05 PROCEDURE — 97110 THERAPEUTIC EXERCISES: CPT

## 2020-05-05 PROCEDURE — 70450 CT HEAD/BRAIN W/O DYE: CPT

## 2020-05-05 PROCEDURE — 97530 THERAPEUTIC ACTIVITIES: CPT

## 2020-05-05 PROCEDURE — 97535 SELF CARE MNGMENT TRAINING: CPT

## 2020-05-05 PROCEDURE — 97116 GAIT TRAINING THERAPY: CPT

## 2020-05-05 PROCEDURE — 6370000000 HC RX 637 (ALT 250 FOR IP): Performed by: PHYSICAL MEDICINE & REHABILITATION

## 2020-05-05 RX ORDER — TRAZODONE HYDROCHLORIDE 50 MG/1
50 TABLET ORAL NIGHTLY
Status: DISCONTINUED | OUTPATIENT
Start: 2020-05-05 | End: 2020-05-20

## 2020-05-05 RX ORDER — LISINOPRIL 10 MG/1
10 TABLET ORAL ONCE
Status: COMPLETED | OUTPATIENT
Start: 2020-05-05 | End: 2020-05-05

## 2020-05-05 RX ORDER — LISINOPRIL 10 MG/1
10 TABLET ORAL DAILY
Status: DISCONTINUED | OUTPATIENT
Start: 2020-05-05 | End: 2020-05-05

## 2020-05-05 RX ORDER — LISINOPRIL 20 MG/1
20 TABLET ORAL DAILY
Status: DISCONTINUED | OUTPATIENT
Start: 2020-05-06 | End: 2020-05-19

## 2020-05-05 RX ADMIN — ASPIRIN 81 MG 81 MG: 81 TABLET ORAL at 09:47

## 2020-05-05 RX ADMIN — TRAZODONE HYDROCHLORIDE 50 MG: 50 TABLET ORAL at 21:06

## 2020-05-05 RX ADMIN — LISINOPRIL 10 MG: 10 TABLET ORAL at 15:15

## 2020-05-05 RX ADMIN — APIXABAN 5 MG: 5 TABLET, FILM COATED ORAL at 21:06

## 2020-05-05 RX ADMIN — ATORVASTATIN CALCIUM 40 MG: 40 TABLET, FILM COATED ORAL at 21:06

## 2020-05-05 RX ADMIN — LISINOPRIL 10 MG: 10 TABLET ORAL at 10:56

## 2020-05-05 RX ADMIN — ENOXAPARIN SODIUM 150 MG: 150 INJECTION SUBCUTANEOUS at 09:49

## 2020-05-05 NOTE — PROGRESS NOTES
mobility/rolling to left at mod A. Short term goal 2: Improve sitting dynamic balance to fair+  Short term goal 3: Pt to perform sit to stand at min/mod A from w/c/bed  Short term goal 4: Pt able to manuever w/c on level surfaces dist of 50 ft with L UE/L LE  Short term goal 5: Pt able to perform supine<>sit at mod A   Short term goal 6: Progress pt to pivot transfers on strong side with 2 person assist.  Long term goals  Time Frame for Long term goals : By DC  Long term goal 1: Pt able to perform bed mobility at min A   Long term goal 2: Pt able to perform sit to stand at min A    Long term goal 3: Pt able to perform transfers at anca (strong side)/mod a (weak side)  Long term goal 4: Pt able to  // bars for 5 to 10 minutes at min/mod A to perform pregait activiites  Long term goal 5: Progress pt to ambulation either in // bars or with appropriate assistive device dist of 20 to 30 ft, mod A, 2nd person CGA. Long term goal 6: Pt able to manuever w/c on level surface, dist of 150 ft, at supervsion level. Long term goal 7: Improve PASS score from 9/36 to 19/36 reduce fall risk. Patient Goals   Patient goals : Could not state    Plan    Plan  Times per week: 1.5 hr/day, 5 to 7 days/week. Current Treatment Recommendations: Strengthening, Functional Mobility Training, Transfer Training, Gait Training, Safety Education & Training, Balance Training, Endurance Training, Wheelchair Mobility Training, Patient/Caregiver Education & Training, Equipment Evaluation, Education, & procurement, Modalities  Safety Devices  Type of devices:  All fall risk precautions in place, Call light within reach, Gait belt, Patient at risk for falls, Left in bed(AM left with transport for CT scan)  Restraints  Initially in place: No        05/05/20 1355 05/05/20 1527   PT Individual Minutes   Time In 1031 1400   Time Out 1124 1430   Minutes 48 30   Minute Variance   Variance 7  --    Reason   (Transport arrived to CT )  --

## 2020-05-05 NOTE — CONSULTS
caved in area that attracts moisture retention. Does not appear to have a fungal component at this time. Recommend keeping clean and dry and treating with a fine layer of zinc oxide cream.    BLE with signs of lymphedema including thick plaques of hyperkeratosis. The patient admits to a history of lymphedema. No open wounds noted at this time. Edema appears to be fairly controlled. Positive Stemmer's sign noted. Toenails thick and overgrown. Response to treatment:  Well tolerated by patient. Plan:     Plan of Care:     Coccygeal skin tear care: keep area as clean and dry as possible. Use a fine layer of zinc moisture barrier cream twice daily.    -Use disposable wicking pads in lieu of linen pads under the patient    -Use turn assist transfer sheet for lateral transfers (in supply room)    -The patient is not interested in emollient cream for his legs at this time. Lymphedema mgmt requires long term treatment adherence, should be addressed in the outpatient setting. Recommend outpatient podiatry follow up for debridement of toenails.    -Turn every 2 hours    -Float heels of of bed with pillows under     -Mepilex sacrum dressing to sacrococcygeal area. Peel back dressing, inspect skin beneath, re-secure. Change every 72 hours and prn wrinkles, soilage. Discontinue Sacral Mepilex if  repeatedly soiled by incontinence.     -Routine incontinence care with Galileo barrier cloths and zinc oxide cream. Apply zinc oxide cream BID and prn incontinence. Covidien moisture wicking under pads vs cloth backed briefs    -Static air overlay. Check inflation each shift by sliding hand under the air overlay. Feel for the patient's heaviest/ most dependant body part. Ideally 1/2 to 1\" of air will be between your hand and the patient's body. Add air prn.     Specialty Bed Required : Yes   [] Low Air Loss   [x] Pressure Redistribution  [] Fluid Immersion  [] Bariatric  [] Total Pressure Relief  [] Other:     Current Diet: DIET TUBE FEEDING BOLUS NPO; Semi-elemental; Gastrostomy; 300; 24; Exceptions are: Ice Chips  Dietician consult:  Yes    Discharge Plan:  Placement for patient upon discharge: home with support   Patient appropriate for Outpatient [address]: N/A    Patient/Caregiver Teaching:  Level of patientunderstanding able to:     [x] Indicates understanding       [] Needs reinforcement  [] Unsuccessful      [] Verbal Understanding  [x] Demonstrated understanding       [] No evidence of learning  [] Refused teaching         [] N/A       Electronically signed by Loyda Wall RN on  5/5/2020 at 10:06 AM

## 2020-05-05 NOTE — PROGRESS NOTES
Speech Language Pathology  Speech Language Pathology  [facility]    Speech Language Treatment Note    Date: 5/5/2020  Patients Name: Jaime Ellison  MRN: [id number]  Diagnosis:   Patient Active Problem List   Diagnosis Code    Cerebrovascular accident (CVA) [facility]) I63.9    Acute ischemic left MCA stroke ([facility] I63.512    Bradycardia R00.1    Aspiration pneumonia ([facility] J69.0    Leukocytosis D72.829    Essential hypertension I10    Atrial fibrillation ([facility] I48.91    Chronic acquired lymphedema I89.0    Oropharyngeal dysphagia R13.12    Acute CVA (cerebrovascular accident) ([facility] I63.9       Pain: 0/10    Speech and Language Treatment  Treatment time:  8295-5134    Subjective: [] Alert [x] Cooperative     [] Confused     [] Agitated      [x] Lethargic    Objective/Assessment:  Auditory Comprehension:  Pt. Able to follow general directions and answer y/n questions asked. Verbal Expression:  n/a     Reading Comprehension: n/a    Speech: ST attempted to cue pt. In oral motor exercises:  Mouth open and close x10  Lingual lateralization x2 pt. Approximating movements  Pt. Unable to follow cues for lingual depression   Lingual elevation x2   labial lateralization x5 with no R sided movement. ST applied PROM for labial lateralization. Labial protrusion x5. Pt. Repeated \"one\" and \"two\" (approximation), \"three\" (approximation), \"four\" (approximation) and \"five\"  (approximation) with written and visual cues. Pt. Able to imitate /m/, \"me\", \"my\", \"mow\", \"moo\" c min A. Pt. Unable to imitate \"may\". Pt. Able to imitate CVC words initial /m/ c 25%. Other:     Plan:  [x] Continue ST services    [] Discharge from ST:      Discharge recommendations: [] Inpatient Rehab   [] [place]   [] Outpatient Therapy  [] Follow up at trauma clinic   [] Other:       Treatment completed by: Jaleesa Samuels A.CCC/SLP

## 2020-05-05 NOTE — PROGRESS NOTES
but needed A for thoroughness. Pt required TA for brief change and hygiene of buttocks. Pt T/F'd to w/c and taken to BR for UB bathing/grooming tasks. Pt washed face with set-up A. Pt is endentulous and refused mouthwash so oral care not assessed. Pt able to brush L side of hair but required A for front/back/R side. Pt washed chest/abdomen with set-up A. Pt required OT to support RUE for bathing and A for thoroughness of R shoulder/back of upper arm. Pt required TA to wash LUE. Pt's personal clothing soiled and no appropriate size clothing available so pt left in gown this morning. OT asked RN to speak with family about bringing in more clothing. Balance  Sitting Balance: Stand by assistance(SBA/CGA; cues to shift weight to L side)  Standing Balance: Moderate assistance(static standing in SS)            Bed mobility  Rolling to Left: Dependent/Total  Rolling to Right: Moderate assistance(with bed rail)  Supine to Sit: Moderate assistance, 2 Person assistance  Sit to Supine: Moderate assistance, 2 Person assistance  Scooting: Moderate assistance  Comment: Pt able to maintain sitting static balance at CGA at EOB/EOM. Transfers  Sit to stand: Moderate assistance, 2 Person assistance  Stand to sit: Moderate assistance(x1 with 2nd person present for safety)  Transfer Comments: marissa/Angélica Echeverria; in PM pt received in room, falling asleep in w/c, leaning over R side. Pt returned to bed with use of SS - stood with Mod A x1 in PM with second A for safety. Wheelchair [employer] Transfers  Equipment Used: [employer], Wheelchair  Level of Asssistance: 2 Person assistance  Wheelchair Transfers Comments: Coretha Oppenheim    SPEECH:  Subjective: []? Alert     [x]? Cooperative     []? Confused     []? Agitated      [x]?  Lethargic        Objective/Assessment:  Auditory Comprehension:  Pt. ID picture via name out of field of 2 c 100% accuracy, via function, Field of 2- 100%     Verbal Expression:  One word sentence completion- 0%, no improvement with phonemic cues or repetition.      Reading Comprehension: match words to pictures, field of 2- 100%     Speech: ST attempted to cue pt. In oral motor exercises:  Lingual lateralization x5 pt. Approximating movements  Pt. Unable to follow cues for lingual elevation, depression and labial lateralization and protrusion. ST applied PROM for labial lateralization. Pt. Repeated \"one\" and \"two\" (approximation), unable to be cued to repeat \"3\", \"4\", \"5\". Objective:  BP (!) 163/91   Pulse 82   Temp 98.6 °F (37 °C) (Oral)   Resp 16   Ht 5' 10\" (1.778 m)   Wt (!) 320 lb (145.2 kg)   SpO2 96%   BMI 45.92 kg/m²       GEN: Well developed, well nourished, in NAD  HEENT:  NCAT. PERRL. EOMI. Mucous membranes pink and moist.   PULM:  Clear to ausculation. No rales or rhonchi. Respirations WNL and unlabored. CV:  Regular rate rhythm. No murmurs or gallops. GI:  Abdomen soft. Obese, Nontender. Non-distended. BS + and equal.    NEUROLOGICAL: Alert and interactive. Following commands. Sensation intact to light touch. Impaired R CN VII.   MSK:  Functional ROM LUE and LLE, Flaccid RUE and RLE. Motor testing 4+/5 key muscles LUE and 4/5 key muscles LLE. R shoulder 1/5, R arm/hand 0/5. R leg/hip muscles 0/5. SKIN: Warm dry and intact. Good turgor except distal BLE chronic skin changes and dryness. EXTREMITIES:  No calf tenderness to palpation. Chronic edema distal BLEs. Lauree Grade PSYCH: Mood WNL. Appropriately interactive. Affect WNL.      Diagnostics:     CT OF THE HEAD WITHOUT CONTRAST  5/5/2020 11:30 am       TECHNIQUE:   CT of the head was performed without the administration of intravenous   contrast. Dose modulation, iterative reconstruction, and/or weight based   adjustment of the mA/kV was utilized to reduce the radiation dose to as low   as reasonably achievable.       COMPARISON:   None.       HISTORY:   ORDERING SYSTEM PROVIDED HISTORY: evaluation of bleeding/SAH to start in the last 72 hours. Current Medications:   Current Facility-Administered Medications: albuterol (PROVENTIL) nebulizer solution 2.5 mg, 2.5 mg, Nebulization, As Directed RT PRN  aspirin chewable tablet 81 mg, 81 mg, PEG Tube, Daily  atorvastatin (LIPITOR) tablet 40 mg, 40 mg, PEG Tube, Nightly  enoxaparin (LOVENOX) injection 150 mg, 1 mg/kg, Subcutaneous, BID  acetaminophen (TYLENOL) tablet 650 mg, 650 mg, Oral, Q4H PRN  polyethylene glycol (GLYCOLAX) packet 17 g, 17 g, Oral, Daily PRN      Impression/Plan:   Impaired ADLs, gait, and mobility due to:      1. Ischemic CVA with R hemiparesis:  PT/OT for gait, mobility, strengthening, endurance, ADLs, and self care. On atorvastatin, ASA. 2. Aphasia/dysphagia: speech treating. Severe expressive aphasia. Poor fluency, comprehension intact/following commands, unable to repeat. Consistent with Broca's. Tube feed via PEG. Dietitian transitioning to bolus/nocturnal as tolerated. 3. Aspiration Pneumonia: completing Augmentin 5/5. Has albuterol nebs prn  4. Hypernatremia: check BMP in the morning. IM following and dietitian added free water flushes. 5. A Fib: Ok to begin Eliquis today after CT brain. Neurology will repeat Ct 5/7 and monitor for bleeding. Neurology following. 6. Insomnia: start Trazodone at hs  7. Bowel management: on miralax daily prn.   8. HTN: Was on lisinopril at home. Persistently elevated BP here. Renal function WNL. Will resume home dose lisinopril 10 mg.   9. DVT Prophylaxis:  low molecular weight heparin - to transition to Eliquis tonight, SCD's while in bed, DOROTHEA's while in bed  10.  Internal medicine for medical management    Electronically signed by Madison Bianchi MD on 5/5/2020 at 9:55 AM      This note is created with the assistance of a speech recognition program.  While intending to generate a document that actually reflects the content of the visit, the document can still have some errors including those of syntax and sound a like

## 2020-05-05 NOTE — PROGRESS NOTES
[address] [facility]    ACUTE REHABILITATION OCCUPATIONAL THERAPY  DAILY NOTE    Date: 20  Patient Name: Jaime Ellison      Room: 1544/2252-47    MRN: [id number]   : 1950  (78 y.o.)  Gender: male   Referring Practitioner: Dr. Catina Fisher  Diagnosis: L MCA ischemic stroke, s/p emergent mechanical thrombectomy 20    Restrictions  Restrictions/Precautions: Fall Risk, Up as Tolerated(PEG tube)  Required Braces or Orthoses?: No  Equipment Used: Bed, Wheelchair    Subjective  Subjective: Pt with significant expressive aphasia - able to communicate with gestures and nod/shake of head for yes/no. Patient Currently in Pain: Denies  Restrictions/Precautions: Fall Risk;Up as Tolerated(PEG tube)  Overall Orientation Status: Impaired  Orientation Level: Oriented to person;Disoriented to place; Disoriented to time;Oriented to situation(Given choices - pt chose incorrect place/month, correct year)    Objective  Cognition  Overall Cognitive Status: Impaired  Awareness of Errors: Assistance required to identify errors made  Sequencing and Organization: Assistance required to identify errors made;Assistance required to generate solutions  Additional Comments: Pt demo'd difficulty following verbal/visual demonstration for one-handed technique to wash L armpit. OT attempted [facility] assistance but pt still demo'd difficulty coordinating task. Pt also repeatedly attempted to place head through R sleeve of t-shirt, requiring A from OT to locate appropriate opening. Pt appeared unaware of his error. Perception  Overall Perceptual Status: Impaired  Unilateral Attention: Cues to maintain midline in standing  Motor Planning: Hand over hand to sequence tasks(for new learning only)  Balance  Sitting Balance: Stand by assistance(Initially CGA but improved to SBA at EOB)  Standing Balance:  Moderate assistance(during dynamic reaching in SS, leans to R side; CGA static)  Bed mobility  Rolling to Right: Moderate assistance  Supine to Sit: Moderate assistance  Sit to Supine: Moderate assistance  Scooting: Moderate assistance  Transfers  Sit to stand: Moderate assistance  Stand to sit: Moderate assistance  Transfer Comments: w/Angélica Darindy; Improved to A x1 this date although SBA of second person when returning to bed following episodes of staring/decreased response. Upper Extremity Function  NDT Treatment: Upper extremity  Type of ROM/Therapeutic Exercise  Type of ROM/Therapeutic Exercise: Free weights  Comment: Pt completed LUE exercises with 2# d-bell to promote increased strength/endurance for improved IND with transfers and functional mobility. Exercises  Scapular Protraction: x20  Scapular Retraction: x20  Shoulder Flexion: x15  Shoulder Extension: x15  Elbow Flexion: x20  Elbow Extension: x20  Grasp/Release: Pt made several attempts to squeeze pink therapy sponge with R hand, no contraction felt. Other: Pt attempted to complete shoulder shrugs, scapular protraction, elbow flexion, and supination/pronation with RUE by completing movements actively with LUE in an attempt to elicit a response, but no contraction was felt     Neuromuscular Education  Neuromuscular education: Yes  NDT Treatment: Upper extremity  Joint Compression: To R shoulder/elbow/wrist  Response to Techniques: No c/o pain; No contraction felt     ADL  Feeding: NPO(PEG tube)  Grooming: Moderate assistance(Washed face w/set-up; A to support R hand/wash L hand)  UE Bathing: Minimal assistance(A to wash LUE; A to support RUE while pt washed it)  LE Bathing: Maximum assistance(Pt washed LLE thigh>ankle; attempted ashlyn-hygiene in SS)  UE Dressing:  Moderate assistance(A to thread RUE, orientation/sequencing, and around trunk)  LE Dressing: None(Not addressed this date 2* pt needing to return to bed)  Toileting: Dependent/Total(Incontinent in brief - TA for clean-up/brief change)  Additional Comments: When bending forward in w/c to wash LLE pt

## 2020-05-05 NOTE — PROGRESS NOTES
recommendations: [] Inpatient Rehab   [] [place]   [] Outpatient Therapy  [] Follow up at trauma clinic   [] Other:       Treatment completed by: Norberto Sommers A.CCC/SLP

## 2020-05-05 NOTE — PLAN OF CARE
training. SPEECH THERAPY:   Goals:             Short-term Goals  Goal 1: Increase speech production to a functional level. Goal 2: Improve oral motor strength and ROM via oral motor exercises. Goal 3: Repeat MBS when appropriate, as improvement is noted. Goal 4: Increase two step directions to a functional level. Plan of Care: Pt to be seen by speech therapy services 1 Hour per day at least 5 out of 7 days per week    Anticipated interventions may include speech/language/communication therapy, cognitive training, group therapy, education, and/or dysphagia therapy based on the above goals. CASE MANAGEMENT:  Goals:   Assist patient/family with discharge planning, patient/family counseling,  and coordination with insurance during the inpatient rehabilitation stay. Other members of the multidisciplinary rehabilitation team that will be involved in the patient's plan of care include recreational therapy, dietary, respiratory therapy, and neuropsychology. Medical issues being managed closely and that require 24 hour availability of a physician:  Swallowing precautions, Bowel/Bladder function, Pain management, Infection protection, DVT prophylaxis, Fall precautions, Fluid/Electrolyte balance, Nutritional status, Respiratory needs, Anemia and History of heart disease                                           Physician anticipated functional outcomes: Improved independence with functional measures   Estimated length of stay for this admission 2-3 weeks  Medical Prognosis: Fair  Anticipated disposition: Home. The potential to achieve the above medical and rehabilitative goals is fair. This plan of care has been developed with the assistance and input of the multidisciplinary rehabilitation team.  The plan was reviewed with the patient on 5/5/2020.   The patient has had the opportunity to provide input to the therapy team.    I have reviewed this Individualized Plan of Care and agree with its contents. Above documentation has been expanded, modified, adjusted to reflect the findings of my evaluations and goals for the patient.     Physician:  Electronically signed by Gennaro Vigil MD on 5/5/20 at 1:22 PM EDT

## 2020-05-05 NOTE — PROGRESS NOTES
04/25/2020    LABA1C 5.6 04/25/2020         Imaging/Diagnostics:        CT head (4/27): Progression of left MCA infarct with scattered subarachnoid hyperdense material representing mild hemorrhage versus contrast staining. Short-term follow-up recommended. 4/27 5/5      CTA head and neck: Occlusion involving proximal left M2 branch at level of left sylvian fissure.     MRI brain: Acute infarcts left MCA region    2D echo: EF 62%. Mild to moderate LVH. Moderately dilated LA. I personally reviewed all of the above medications, clinical laboratory, imaging and other diagnostic tests. Impression:      1. Left MCA infarct secondary to left M2 occlusion status post mechanical thrombectomy w TICI IIc recanalization. 2. Residual expressive aphasia, dysarthria, right hemiparesis, right hemineglect  3. Dysphagia status post PEG tube placement  4.  Questionable area of hemorrhage versus contrast extravasation in left MCA distribution on repeat CT head, 4/27; resolved     Plan:     · Start Eliquis 5 mg PO BID tonight   · Repeat CT head in 48 hours to make sure no evidence of hemorrhage  · Continue current therapies  · We will follow remotely    Electronically signed by Lisa Nelson DO on 5/5/2020 at 11:30 AM      Lisa Nelson [address] [place] [address]  Neurology

## 2020-05-05 NOTE — CONSULTS
skin 2 times daily for 4 doses 5/3/20 5/5/20  Amadeo Danielle MD        Allergies:     Patient has no known allergies. Social History:     Tobacco:    has no history on file for tobacco.  Alcohol:      has no history on file for alcohol. Drug Use:  has no history on file for drug. Family History:     No family history on file. Review of Systems:     Positive and Negative as described in HPI. CONSTITUTIONAL:  negative for fevers, chills, sweats, fatigue, weight loss  HEENT:  negative for vision, hearing changes, runny nose, throat pain  RESPIRATORY:  negative for shortness of breath, cough, congestion, wheezing. CARDIOVASCULAR:  negative for chest pain, palpitations.   GASTROINTESTINAL:  negative for nausea, vomiting, diarrhea, constipation, change in bowel habits, abdominal pain   GENITOURINARY:  negative for difficulty of urination, burning with urination, frequency   INTEGUMENT:  negative for rash, skin lesions, easy bruising   HEMATOLOGIC/LYMPHATIC:  negative for swelling/edema   ALLERGIC/IMMUNOLOGIC:  negative for urticaria , itching  ENDOCRINE:  negative increase in drinking, increase in urination, hot or cold intolerance  MUSCULOSKELETAL:  negative joint pains, muscle aches, swelling of joints  NEUROLOGICAL: Right-sided weakness, aphasia   BEHAVIOR/PSYCH:  negative for depression, anxiety    Physical Exam:     BP (!) 163/87   Pulse 68   Temp 98.4 °F (36.9 °C) (Oral)   Resp 18   Ht 5' 10\" (1.778 m)   Wt (!) 320 lb (145.2 kg)   SpO2 97%   BMI 45.92 kg/m²   Temp (24hrs), Av.4 °F (36.9 °C), Min:98.4 °F (36.9 °C), Max:98.4 °F (36.9 °C)    Recent Labs     20  1203 20  1606 20  1556 20  2035   POCGLU 109 113* 120* 110       Intake/Output Summary (Last 24 hours) at 2020 1981  Last data filed at 2020 1006  Gross per 24 hour   Intake 501 ml   Output --   Net 501 ml       General Appearance:  alert, well appearing, and in no acute distress, central 9.3 6.0 - 12.0 fL    NRBC Automated NOT REPORTED per 100 WBC   POC Glucose Fingerstick    Collection Time: 05/04/20  3:56 PM   Result Value Ref Range    POC Glucose 120 (H) 75 - 110 mg/dL   POC Glucose Fingerstick    Collection Time: 05/04/20  8:35 PM   Result Value Ref Range    POC Glucose 110 75 - 110 mg/dL       Imaging/Diagonstics:      Assessment :      Primary Problem  <principal problem not specified>    Active Hospital Problems    Diagnosis Date Noted    Acute CVA (cerebrovascular accident) ([facility] [I63.9] 05/03/2020   Active Problems:    Acute ischemic left MCA stroke ([facility] [address]    Essential hypertension    Atrial fibrillation (HCC)    Acute CVA (cerebrovascular accident) ([facility] [address]  Resolved Problems:    * No resolved hospital problems. *        Plan:     1. Right-sided weakness, Broca's aphasia, had acute left MCA infarct status post mechanical thrombectomy, on aspirin, Lipitor  2. Atrial fibrillation, on Eliquis 5 mg twice a day  3. Dysphagia, status post PEG tube placement  4. Hypertension, un controlled, increasing dose of lisinopril to 20 mg from 10 mg.  5.  Aspiration pneumonia, completed antibiotics      Consultations:   Ruddy Carmen TO NEUROLOGY      Robert Camarena MD  5/4/2020  10:51 PM    Copy sent to Dr. Cristino Henry MD    Please note that this chart was generated using voice recognition Dragon dictation software. Although every effort was made to ensure the accuracy of this automated transcription, some errors in transcription may have occurred.

## 2020-05-06 LAB
-: NORMAL
ANION GAP SERPL CALCULATED.3IONS-SCNC: 12 MMOL/L (ref 9–17)
BUN BLDV-MCNC: 33 MG/DL (ref 8–23)
BUN/CREAT BLD: ABNORMAL (ref 9–20)
CALCIUM SERPL-MCNC: 8.9 MG/DL (ref 8.6–10.4)
CHLORIDE BLD-SCNC: 107 MMOL/L (ref 98–107)
CO2: 26 MMOL/L (ref 20–31)
CREAT SERPL-MCNC: 0.97 MG/DL (ref 0.7–1.2)
GFR AFRICAN AMERICAN: >60 ML/MIN
GFR NON-AFRICAN AMERICAN: >60 ML/MIN
GFR SERPL CREATININE-BSD FRML MDRD: ABNORMAL ML/MIN/{1.73_M2}
GFR SERPL CREATININE-BSD FRML MDRD: ABNORMAL ML/MIN/{1.73_M2}
GLUCOSE BLD-MCNC: 107 MG/DL (ref 75–110)
GLUCOSE BLD-MCNC: 117 MG/DL (ref 75–110)
GLUCOSE BLD-MCNC: 147 MG/DL (ref 75–110)
GLUCOSE BLD-MCNC: 152 MG/DL (ref 70–99)
POTASSIUM SERPL-SCNC: 4 MMOL/L (ref 3.7–5.3)
REASON FOR REJECTION: NORMAL
SODIUM BLD-SCNC: 145 MMOL/L (ref 135–144)
ZZ NTE CLEAN UP: ORDERED TEST: NORMAL
ZZ NTE WITH NAME CLEAN UP: SPECIMEN SOURCE: NORMAL

## 2020-05-06 PROCEDURE — 82947 ASSAY GLUCOSE BLOOD QUANT: CPT

## 2020-05-06 PROCEDURE — 97112 NEUROMUSCULAR REEDUCATION: CPT

## 2020-05-06 PROCEDURE — 36415 COLL VENOUS BLD VENIPUNCTURE: CPT

## 2020-05-06 PROCEDURE — 97110 THERAPEUTIC EXERCISES: CPT

## 2020-05-06 PROCEDURE — 1180000000 HC REHAB R&B

## 2020-05-06 PROCEDURE — 6370000000 HC RX 637 (ALT 250 FOR IP): Performed by: INTERNAL MEDICINE

## 2020-05-06 PROCEDURE — 6370000000 HC RX 637 (ALT 250 FOR IP): Performed by: STUDENT IN AN ORGANIZED HEALTH CARE EDUCATION/TRAINING PROGRAM

## 2020-05-06 PROCEDURE — 99232 SBSQ HOSP IP/OBS MODERATE 35: CPT | Performed by: PSYCHIATRY & NEUROLOGY

## 2020-05-06 PROCEDURE — 99232 SBSQ HOSP IP/OBS MODERATE 35: CPT | Performed by: PHYSICAL MEDICINE & REHABILITATION

## 2020-05-06 PROCEDURE — 97116 GAIT TRAINING THERAPY: CPT

## 2020-05-06 PROCEDURE — 80048 BASIC METABOLIC PNL TOTAL CA: CPT

## 2020-05-06 PROCEDURE — 6370000000 HC RX 637 (ALT 250 FOR IP): Performed by: PHYSICAL MEDICINE & REHABILITATION

## 2020-05-06 PROCEDURE — 6370000000 HC RX 637 (ALT 250 FOR IP): Performed by: PSYCHIATRY & NEUROLOGY

## 2020-05-06 PROCEDURE — 97535 SELF CARE MNGMENT TRAINING: CPT

## 2020-05-06 PROCEDURE — 97530 THERAPEUTIC ACTIVITIES: CPT

## 2020-05-06 PROCEDURE — 92507 TX SP LANG VOICE COMM INDIV: CPT

## 2020-05-06 PROCEDURE — 99232 SBSQ HOSP IP/OBS MODERATE 35: CPT | Performed by: INTERNAL MEDICINE

## 2020-05-06 RX ADMIN — APIXABAN 5 MG: 5 TABLET, FILM COATED ORAL at 07:24

## 2020-05-06 RX ADMIN — ATORVASTATIN CALCIUM 40 MG: 40 TABLET, FILM COATED ORAL at 19:40

## 2020-05-06 RX ADMIN — APIXABAN 5 MG: 5 TABLET, FILM COATED ORAL at 19:40

## 2020-05-06 RX ADMIN — ASPIRIN 81 MG 81 MG: 81 TABLET ORAL at 07:24

## 2020-05-06 RX ADMIN — LISINOPRIL 20 MG: 20 TABLET ORAL at 07:24

## 2020-05-06 RX ADMIN — TRAZODONE HYDROCHLORIDE 50 MG: 50 TABLET ORAL at 19:40

## 2020-05-06 NOTE — PROGRESS NOTES
using AE/modified techniques as needed. Long term goal 2: Pt will complete LB ADL's/toileting with Min A and Good safety using AE/modified techniques as needed. Long term goal 3: Pt will complete functional transfers with Min-Mod A and Good safety using appropriate DME. Long term goal 4: Pt will demo IND with appropriate HEP for RUE ROM/strength/coordination.      05/06/20 0800 05/06/20 1303   OT Individual Minutes   Time In 0800 1303   Time Out 0904 1330   Minutes 64 27   Time Code Minutes    Timed Code Treatment Minutes 59 Minutes 27 Minutes     Electronically signed by Avi Mullins on 5/6/20 at 2:55 PM EDT

## 2020-05-06 NOTE — PROGRESS NOTES
place, and time with normal affect  Head:  normocephalic, atraumatic. Eye: no icterus, redness, pupils equal and reactive, extraocular eye movements intact, conjunctiva clear  Ear: normal external ear, no discharge, hearing intact  Nose:  no drainage noted  Mouth: mucous membranes moist  Neck: supple, no carotid bruits, thyroid not palpable, dysphagia present  Lungs: Bilateral equal air entry, clear to ausculation, no wheezing, rales or rhonchi, normal effort  Cardiovascular: normal rate, regular rhythm, no murmur, gallop, rub. Abdomen: Soft, nontender, nondistended, normal bowel sounds, no hepatomegaly or splenomegaly, status post PEG tube placement  Neurologic: Weakness on right side of body, Broca's aphasia present  Skin: No gross lesions, rashes, bruising or bleeding on exposed skin area  Extremities:  peripheral pulses palpable, no pedal edema or calf pain with palpation  Psych: normal affect    Investigations:      Laboratory Testing:  Recent Results (from the past 24 hour(s))   POC Glucose Fingerstick    Collection Time: 05/05/20  8:08 PM   Result Value Ref Range    POC Glucose 132 (H) 75 - 110 mg/dL   SPECIMEN REJECTION    Collection Time: 05/06/20  6:29 AM   Result Value Ref Range    Specimen Source . BLOOD     Ordered Test BMP     Reason for Rejection Unable to perform testing: Specimen hemolyzed.      - NOT REPORTED    POC Glucose Fingerstick    Collection Time: 05/06/20  7:12 AM   Result Value Ref Range    POC Glucose 117 (H) 75 - 110 mg/dL   Basic Metabolic Panel    Collection Time: 05/06/20  8:10 AM   Result Value Ref Range    Glucose 152 (H) 70 - 99 mg/dL    BUN 33 (H) 8 - 23 mg/dL    CREATININE 0.97 0.70 - 1.20 mg/dL    Bun/Cre Ratio NOT REPORTED 9 - 20    Calcium 8.9 8.6 - 10.4 mg/dL    Sodium 145 (H) 135 - 144 mmol/L    Potassium 4.0 3.7 - 5.3 mmol/L    Chloride 107 98 - 107 mmol/L    CO2 26 20 - 31 mmol/L    Anion Gap 12 9 - 17 mmol/L    GFR Non-African American >60 >60 mL/min    GFR African

## 2020-05-06 NOTE — PATIENT CARE CONFERENCE
nutrition note for details. SOCIAL WORK ASSESSMENT  Assessment: family   Pre-Admission Status:  Lives With: Family(Daughter?)  Type of Home: Mobile home  Home Layout: One level  Home Access: Stairs to enter without rails  Entrance Stairs - Number of Steps: 5  ADL Assistance: Independent  Homemaking Assistance: Independent  Ambulation Assistance: Independent  Transfer Assistance: Independent  Additional Comments: Information from case management note. Pt with expressive aphasia. (Pt attempted to answer questions but got frustrated.)     Family Education: Need to make contact with family to initiate education    Risk for Readmission: Low <10   Readmission Risk              Risk of Unplanned Readmission:        10         Critical Items: None       Problem / Barrier Intervention / Plan  Results   Impaired functional mobility 2* CVA Progress from 2 assist for bed mobility, transfers, gait to one assist, safe technique training, appropriate AD, family training, w/c mobility    Stairs to enter/exit home Discussion of home modifications, stair training as able with strengthening/ROM, family training/education    Impaired ADL Status related to CVA Patient and care giver training in modified care strategies and use of devices to support self-care safety    Dysphagia d/t apraxia Oral motor exercises as pt. able    Impaired speech production d/t apraxia Articulation drills                Total Self Care Score    Total Mobility Score  Admission Score:  8      Admission Score:  15  Progress:  TBD      Progress:  TBD  Goal:  27/42         Goal:  31/90   `  Discharge Plan   Estimated Discharge Date: 5/29/2020  Overnight or Day Pass: No  Factors facilitating achievement of predicted outcomes:  Motivated, Cooperative, Pleasant and Has homemaker services  Barriers to the achievement of predicted outcomes: Communication deficit, Decreased endurance, Upper extremity weakness, Lower extremity weakness, Incontinence of bowel,

## 2020-05-06 NOTE — PROGRESS NOTES
umang glass (dependent); squat pivot (Max/Min x2) )  Squat Pivot Transfers: Maximum Assistance, Minimal Assistance, 2 Person Assistance  Comment: with umang lora, pt demonstrating improved tolerance to standing from previous date. Emphasis on TKE of R LE while standing  Ambulation  Ambulation?: Yes  Ambulation 1  Surface: level tile  Device: (Green walker lift )  Assistance: Dependent/Total(Min x3 assist (min A to advance R LE and block knee; Min A for balance; min A to steer walker lift) )  Quality of Gait: Improved quad control with tactile/verbal cues, right push; wide JONH   Gait Deviations: Slow Catherine, Increased JONH, Decreased step length, Decreased step height  Distance: 8 ft     Surface: level tile  Ambulation 1  Surface: level tile  Device: (Green walker lift )  Assistance: Dependent/Total(Min x3 assist (min A to advance R LE and block knee; Min A for balance; min A to steer walker lift) )  Quality of Gait: Improved quad control with tactile/verbal cues, right push; wide JONH   Gait Deviations: Slow Catherine, Increased JONH, Decreased step length, Decreased step height  Distance: 8 ft     OT:  ADL  Equipment Provided: Dressing stick(Used in PM to doff socks w/Min A)  Feeding: NPO(PEG tube)  Grooming: Moderate assistance(Washed face w/set-up; A to support R hand/wash L hand)  UE Bathing: Minimal assistance(A to wash LUE; A to support RUE while pt washed it)  LE Bathing: Maximum assistance(Pt washed LLE thigh>ankle; attempted ashlyn-hygiene in SS)  UE Dressing: Moderate assistance(A to thread RUE, orientation/sequencing, and around trunk)  LE Dressing: None(Not addressed this date 2* pt needing to return to bed)  Toileting: Dependent/Total(Incontinent in brief - TA for clean-up/brief change)  Additional Comments: When bending forward in w/c to wash LLE pt briefly became glassy eyed and unresponsive. Pt assisted to sit upright and was giving delayed repsonses.   After 1-2 minutes pt able to make eye contact and belching and smell of residual, but not emesis. Pt. Often shaking head, trying to communicate, all attempts of ST trying to make pt. More comfortable were unsucessful. RN in to check pt. Residual tube feeds, they were high. She stated she would talk to dietician and left room. ST alerted RN at end of session of pt. Belching tube feeds and very warm to touch. Objective:  /64   Pulse 95   Temp 97.6 °F (36.4 °C) (Oral)   Resp 16   Ht 5' 10\" (1.778 m)   Wt (!) 320 lb (145.2 kg)   SpO2 91%   BMI 45.92 kg/m²       GEN: Well developed, well nourished, in NAD  HEENT:  NCAT.  PERRL.  EOMI.  Mucous membranes pink and moist.   PULM:  Clear to ausculation. No rales or rhonchi. Respirations WNL and unlabored. CV:  Regular rate rhythm.  No murmurs or gallops. GI:  Abdomen soft. Obese, Nontender. Non-distended.  BS + and equal.    NEUROLOGICAL: Alert and interactive. Following commands. Sensation intact to light touch. Stable Impaired R CN VII.   MSK:  Functional ROM LUE and LLE, Flaccid RUE and RLE. Motor testing 4+/5 key muscles LUE and 4/5 key muscles LLE. R shoulder 1/5, R arm/hand 0/5. R hip flexor muscles 1/5.     SKIN: Warm dry and intact. Good turgor except distal BLE chronic skin changes and dryness. EXTREMITIES:  No calf tenderness to palpation. Chronic edema distal BLEs. .    PSYCH: Mood WNL. Appropriately interactive. Affect WNL. Diagnostics:     CBC:   Recent Labs     05/04/20  0758   WBC 9.0   RBC 4.59   HGB 13.6   HCT 40.6*   MCV 88.4   RDW 15.1*        BMP:   Recent Labs     05/04/20  0758 05/06/20  0810   * 145*   K 4.4 4.0   * 107   CO2 26 26   BUN 32* 33*   CREATININE 0.86 0.97   GLUCOSE 146* 152*     BNP: No results for input(s): BNP in the last 72 hours. PT/INR: No results for input(s): PROTIME, INR in the last 72 hours. APTT: No results for input(s): APTT in the last 72 hours.   CARDIAC ENZYMES: No results for input(s): CKMB, CKMBINDEX, TROPONINT in the last 72 hours.    Invalid input(s): CKTOTAL;3  FASTING LIPID PANEL:  Lab Results   Component Value Date    CHOL 111 04/25/2020    HDL 46 04/25/2020    TRIG 80 04/25/2020     LIVER PROFILE: No results for input(s): AST, ALT, ALB, BILIDIR, BILITOT, ALKPHOS in the last 72 hours. Current Medications:   Current Facility-Administered Medications: traZODone (DESYREL) tablet 50 mg, 50 mg, PEG Tube, Nightly  lisinopril (PRINIVIL;ZESTRIL) tablet 20 mg, 20 mg, PEG Tube, Daily  apixaban (ELIQUIS) tablet 5 mg, 5 mg, Oral, BID  albuterol (PROVENTIL) nebulizer solution 2.5 mg, 2.5 mg, Nebulization, As Directed RT PRN  aspirin chewable tablet 81 mg, 81 mg, PEG Tube, Daily  atorvastatin (LIPITOR) tablet 40 mg, 40 mg, PEG Tube, Nightly  acetaminophen (TYLENOL) tablet 650 mg, 650 mg, Oral, Q4H PRN  polyethylene glycol (GLYCOLAX) packet 17 g, 17 g, Oral, Daily PRN      Impression/Plan:   Impaired ADLs, gait, and mobility due to:      1. Ischemic CVA with R hemiparesis:  PT/OT for gait, mobility, strengthening, endurance, ADLs, and self care. On atorvastatin, ASA. 2. Aphasia/dysphagia: speech treating. Severe expressive aphasia. Poor fluency, comprehension intact/following commands, unable to repeat.  Consistent with Broca's. Tube feed via PEG.  Dietitian transitioning to bolus/nocturnal as tolerated. Discussed with Dr. Rory Benítez today. He will monitor residual volumes and consider Reglan if needed pending response to adjustments in tube feeding by dietitian. 3. Aspiration Pneumonia: completed Augmentin 5/5. Has albuterol nebs prn  4. Hypernatremia: IM following and dietitian added free water flushes. 5. A Fib: resumed Eliquis 5/5 pm. Neurology will repeat Ct 5/7 and monitor for bleeding. Neurology following remotely. 6. Insomnia: onTrazodone at hs. Continue current dose for now and monitor.    7. Bowel management: on miralax daily prn.   8. HTN: On home dose lisinopril 10 mg.   9. DVT Prophylaxis: On Eliquis, SCD's while in bed, DOROTHEA's

## 2020-05-06 NOTE — PROGRESS NOTES
5.6 04/25/2020         Imaging/Diagnostics:        CT head (4/27): Progression of left MCA infarct with scattered subarachnoid hyperdense material representing mild hemorrhage versus contrast staining.  Short-term follow-up recommended.                          4/27 5/5      CTA head and neck: Occlusion involving proximal left M2 branch at level of left sylvian fissure.     MRI brain: Acute infarcts left MCA region    2D echo: EF 62%.  Mild to moderate LVH.  Moderately dilated LA. I personally reviewed all of the above medications, clinical laboratory, imaging and other diagnostic tests. Impression:      1. Left MCA infarct secondary to left M2 occlusion status post mechanical thrombectomy w TICI IIc recanalization  2. Residual expressive aphasia, dysarthria, right hemiparesis, right hemineglect  3.  Dysphagia status post PEG tube placement    Plan:      Maintain Eliquis 5 mg p.o. twice daily   Check 48-hour CT head which will be done on 5/7   Continue therapies    this we will follow remotely      Electronically signed by Lexis Flores DO on 5/6/2020 at [address]  Neurology

## 2020-05-06 NOTE — PROGRESS NOTES
Deviations: Slow Catherine;Decreased step length;Decreased step height  Distance: 15'    BALANCE Posture: Fair  Sitting - Static: Fair(1 UE support EOB, no back support)  Sitting - Dynamic: Fair;-(EOB, no back support, 1 UE support)  Standing - Static: Fair;-(Umang Stedy)  Standing - Dynamic: Fair;-(Umang Stedy)    EXERCISES    Other exercises 1: Sit to  umang steady x8 (hold 1-2 minutes each stand)  Other exercises 2: Lateral weight shifting in umang steady, B hips & TKE with ispilateral knee flexion  Other exercises 4: Reaching with L UE (slightly OOBOS) while standing in umang steady  Other exercises 5: Seated EOB dynamic reaching with L UE, 1 min., no LOB; dangling EOB, 5 min., Supervision. Activity Tolerance: Patient limited by endurance, Patient limited by fatigue, Treatment limited secondary to medical complications (free text)(Aphasia)    Current Treatment Recommendations: Strengthening, Functional Mobility Training, Transfer Training, Gait Training, Safety Education & Training, Balance Training, Endurance Training, Wheelchair Mobility Training, Patient/Caregiver Education & Training, Equipment Evaluation, Education, & procurement, Modalities    Conditions Requiring Skilled Therapeutic Intervention  Body structures, Functions, Activity limitations: Decreased functional mobility ; Decreased strength;Decreased endurance;Decreased sensation;Decreased balance  Barriers to Learning: Aphasia  REQUIRES PT FOLLOW UP: Yes  Discharge Recommendations: Patient would benefit from continued therapy after discharge;24 hour supervision or assist;Home with Home health PT    Goals  Short term goals  Time Frame for Short term goals: 10 days  Short term goal 1: Pt to perform bed mobility/rolling to left at mod A.   Short term goal 2: Improve sitting dynamic balance to fair+  Short term goal 3: Pt to perform sit to stand at min/mod A from w/c/bed  Short term goal 4: Pt able to manuever w/c on level surfaces dist of 50 ft with L UE/L LE  Short term goal 5: Pt able to perform supine<>sit at mod A   Short term goal 6: Progress pt to pivot transfers on strong side with 2 person assist.  Long term goals  Time Frame for Long term goals : By DC  Long term goal 1: Pt able to perform bed mobility at min A   Long term goal 2: Pt able to perform sit to stand at min A    Long term goal 3: Pt able to perform transfers at anca (strong side)/mod a (weak side)  Long term goal 4: Pt able to  // bars for 5 to 10 minutes at min/mod A to perform pregait activiites  Long term goal 5: Progress pt to ambulation either in // bars or with appropriate assistive device dist of 20 to 30 ft, mod A, 2nd person CGA. Long term goal 6: Pt able to manuever w/c on level surface, dist of 150 ft, at supervsion level. Long term goal 7: Improve PASS score from 9/36 to 19/36 reduce fall risk.       05/06/20 1025 05/06/20 1553   PT Individual Minutes   Time In 1020 1420   Time Out 2169 7266   Minutes 44 63     Electronically signed by Janes Garcia PTA on 5/6/20 at 4:15 PM EDT

## 2020-05-07 ENCOUNTER — APPOINTMENT (OUTPATIENT)
Dept: CT IMAGING | Age: 70
DRG: 056 | End: 2020-05-07
Attending: PHYSICAL MEDICINE & REHABILITATION
Payer: MEDICARE

## 2020-05-07 LAB — GLUCOSE BLD-MCNC: 164 MG/DL (ref 75–110)

## 2020-05-07 PROCEDURE — 92507 TX SP LANG VOICE COMM INDIV: CPT

## 2020-05-07 PROCEDURE — 97112 NEUROMUSCULAR REEDUCATION: CPT

## 2020-05-07 PROCEDURE — 99232 SBSQ HOSP IP/OBS MODERATE 35: CPT | Performed by: INTERNAL MEDICINE

## 2020-05-07 PROCEDURE — 97110 THERAPEUTIC EXERCISES: CPT

## 2020-05-07 PROCEDURE — 97535 SELF CARE MNGMENT TRAINING: CPT

## 2020-05-07 PROCEDURE — 99232 SBSQ HOSP IP/OBS MODERATE 35: CPT | Performed by: PHYSICAL MEDICINE & REHABILITATION

## 2020-05-07 PROCEDURE — 6370000000 HC RX 637 (ALT 250 FOR IP): Performed by: INTERNAL MEDICINE

## 2020-05-07 PROCEDURE — 97116 GAIT TRAINING THERAPY: CPT

## 2020-05-07 PROCEDURE — 6370000000 HC RX 637 (ALT 250 FOR IP): Performed by: PSYCHIATRY & NEUROLOGY

## 2020-05-07 PROCEDURE — 97530 THERAPEUTIC ACTIVITIES: CPT

## 2020-05-07 PROCEDURE — 99232 SBSQ HOSP IP/OBS MODERATE 35: CPT | Performed by: PSYCHIATRY & NEUROLOGY

## 2020-05-07 PROCEDURE — 92526 ORAL FUNCTION THERAPY: CPT

## 2020-05-07 PROCEDURE — 70450 CT HEAD/BRAIN W/O DYE: CPT

## 2020-05-07 PROCEDURE — 82947 ASSAY GLUCOSE BLOOD QUANT: CPT

## 2020-05-07 PROCEDURE — 97542 WHEELCHAIR MNGMENT TRAINING: CPT

## 2020-05-07 PROCEDURE — 6370000000 HC RX 637 (ALT 250 FOR IP): Performed by: STUDENT IN AN ORGANIZED HEALTH CARE EDUCATION/TRAINING PROGRAM

## 2020-05-07 PROCEDURE — 6370000000 HC RX 637 (ALT 250 FOR IP): Performed by: PHYSICAL MEDICINE & REHABILITATION

## 2020-05-07 PROCEDURE — 1180000000 HC REHAB R&B

## 2020-05-07 RX ADMIN — ASPIRIN 81 MG 81 MG: 81 TABLET ORAL at 07:57

## 2020-05-07 RX ADMIN — LISINOPRIL 20 MG: 20 TABLET ORAL at 07:57

## 2020-05-07 RX ADMIN — APIXABAN 5 MG: 5 TABLET, FILM COATED ORAL at 07:57

## 2020-05-07 RX ADMIN — TRAZODONE HYDROCHLORIDE 50 MG: 50 TABLET ORAL at 21:54

## 2020-05-07 RX ADMIN — ATORVASTATIN CALCIUM 40 MG: 40 TABLET, FILM COATED ORAL at 21:54

## 2020-05-07 RX ADMIN — APIXABAN 5 MG: 5 TABLET, FILM COATED ORAL at 21:54

## 2020-05-07 ASSESSMENT — PAIN SCALES - GENERAL: PAINLEVEL_OUTOF10: 0

## 2020-05-07 NOTE — PROGRESS NOTES
normal affect  Head:  normocephalic, atraumatic. Eye: no icterus, redness, pupils equal and reactive, extraocular eye movements intact, conjunctiva clear  Ear: normal external ear, no discharge, hearing intact  Nose:  no drainage noted  Mouth: mucous membranes moist  Neck: supple, no carotid bruits, thyroid not palpable, dysphagia present  Lungs: Bilateral equal air entry, clear to ausculation, no wheezing, rales or rhonchi, normal effort  Cardiovascular: normal rate, regular rhythm, no murmur, gallop, rub. Abdomen: Soft, nontender, nondistended, normal bowel sounds, no hepatomegaly or splenomegaly, status post PEG tube placement  Neurologic: Weakness on right side of body, Broca's aphasia present  Skin: No gross lesions, rashes, bruising or bleeding on exposed skin area  Extremities:  peripheral pulses palpable, no pedal edema or calf pain with palpation  Psych: normal affect    Investigations:      Laboratory Testing:  Recent Results (from the past 24 hour(s))   POC Glucose Fingerstick    Collection Time: 05/06/20  4:06 PM   Result Value Ref Range    POC Glucose 107 75 - 110 mg/dL   POC Glucose Fingerstick    Collection Time: 05/07/20 10:45 AM   Result Value Ref Range    POC Glucose 164 (H) 75 - 110 mg/dL       Imaging/Diagonstics:      Assessment :      Primary Problem  <principal problem not specified>    Active Hospital Problems    Diagnosis Date Noted    Acute CVA (cerebrovascular accident) ([address] [I63.9] 05/03/2020    Atrial fibrillation ([address] [I48.91] 04/30/2020    Essential hypertension [I10] 04/30/2020    Acute ischemic left MCA stroke (HCC) [I63.512]    Active Problems:    Acute ischemic left MCA stroke ([address]    Essential hypertension    Atrial fibrillation (HCC)    Acute CVA (cerebrovascular accident) ([address]  Resolved Problems:    * No resolved hospital problems. *        Plan:     1.   Right-sided weakness, Broca's aphasia, had acute left MCA infarct status post mechanical thrombectomy, on aspirin, Lipitor, had CT head   2. Atrial fibrillation, on Eliquis 5 mg twice a day  3. Dysphagia, status post PEG tube placement  4. Hypertension, un controlled, increasing dose of lisinopril to 20 mg from 10 mg.  5.  Aspiration pneumonia, completed antibiotics  6 . Tolearting Tube feed   Consultations:   IP CONSULT TO INTERNAL MEDICINE  IP CONSULT TO NEUROLOGY      Calin Knox MD  5/7/2020  3:32 PM    Copy sent to Dr. Melanie White MD    Please note that this chart was generated using voice recognition Dragon dictation software. Although every effort was made to ensure the accuracy of this automated transcription, some errors in transcription may have occurred.

## 2020-05-07 NOTE — PROGRESS NOTES
Resp 16   Ht 5' 10\" (1.778 m)   Wt (!) 320 lb (145.2 kg)   SpO2 97%   BMI 45.92 kg/m²   Temp (24hrs), Av.6 °F (36.4 °C), Min:97.5 °F (36.4 °C), Max:97.7 °F (36.5 °C)          Neurological examination:    Mental status    Awake, alert; following most commands; severe expressive aphasia, dysarthria.  Right hemineglect.    Cranial nerves    II - visual fields intact to confrontation; pupils reactive  III, IV, VI - extraocular muscles intact; no LARS; no nystagmus; no ptosis   V - normal facial sensation                                                               VII -right lower facial weakness                                                             VIII - intact hearing                                                                             IX, X - symmetrical palate elevation                                               XI - symmetrical shoulder shrug                                                       XII - midline tongue without atrophy or fasciculation      Motor function  Strength: 1/5 RUE, 1/5 RLE, 5/5 LUE, 5/5  LLE   decreased tone right hemibody  No tremors                       Sensory function Intact to touch, pin, vibration, proprioception throughout      Cerebellar Intact finger-nose-finger testing on left    Reflex function 2/4 symmetric throughout . Downgoing plantar response bilaterally. (-)Nolan's sign bilaterally    Gait                  not assessed due to right-sided hemiparesis                Diagnostics:      Laboratory Testing:  CBC: No results for input(s): WBC, HGB, PLT in the last 72 hours.   BMP:    Recent Labs     20  0810   *   K 4.0      CO2 26   BUN 33*   CREATININE 0.97   GLUCOSE 152*         Lab Results   Component Value Date    CHOL 111 2020    LDLCHOLESTEROL 49 2020    HDL 46 2020    TRIG 80 2020    TSH 2.38 2020    INR 1.1 2020    LABA1C 5.6 2020         Imaging/Diagnostics:        CT head (4/27): Progression of left MCA infarct with scattered subarachnoid hyperdense material representing mild hemorrhage versus contrast staining.  Short-term follow-up recommended.                          4/27 5/5      CTA head and neck: Occlusion involving proximal left M2 branch at level of left sylvian fissure.     MRI brain: Acute infarcts left MCA region    2D echo: EF 62%.  Mild to moderate LVH.  Moderately dilated LA. I personally reviewed all of the above medications, clinical laboratory, imaging and other diagnostic tests. Impression:      1. Left MCA infarct secondary to left M2 occlusion status post mechanical thrombectomy w TICI IIc recanalization  2. Residual expressive aphasia, dysarthria, right hemiparesis, right hemineglect  3. Dysphagia status post PEG tube placement    Plan:      Maintain Eliquis 5 mg p.o. twice daily   Continue therapies   Neurology as outpatient in 4 to 6 weeks   We will sign off at this time. Please contact with any further questions or concerns.         Electronically signed by Mirza Crandall DO on 5/7/2020 at 11:33 AM      Mirza Crandall [address] [place] [address]  Neurology

## 2020-05-07 NOTE — PLAN OF CARE
Problem: Falls - Risk of:  Goal: Will remain free from falls  Description: Will remain free from falls  5/7/2020 0417 by Kyle Mcclellan RN  Outcome: Ongoing     Problem:  Activity:  Goal: Ability to tolerate increased activity will improve  Description: Ability to tolerate increased activity will improve  5/7/2020 0417 by Kyle Mcclellan RN  Outcome: Ongoing     Problem: Skin Integrity:  Goal: Will show no infection signs and symptoms  Description: Will show no infection signs and symptoms  5/7/2020 0417 by Kyle Mcclellan RN  Outcome: Ongoing     Problem: Nutrition  Goal: Optimal nutrition therapy  5/7/2020 0417 by Kyle Mcclellan RN  Outcome: Ongoing

## 2020-05-07 NOTE — PROGRESS NOTES
[place]  Speech Language Pathology    Date: 5/7/2020  Patient Name: Jaime Ellison  YOB: 1950   AGE: 78 y.o. MRN: [id number]        Patient Not Available for Speech Therapy     Due to:  [] Testing  [] Hemodialysis  [] Cancelled by RN  [] Surgery   [] Intubation/Sedation/Pain Medication  [] Medical instability  [x] Other:  Pt. In CT. Completed by: Jaleesa Hubbell. A.CCC/SLP

## 2020-05-07 NOTE — PROGRESS NOTES
LUE strengthening, OT facilitated PROM of RUE at the same time to promote neuro-muscular re-education via mirror neurons. Exercises  Scapular Protraction: x20  Scapular Retraction: x20  Shoulder Flexion: x20  Shoulder Extension: x20  Shoulder ABduction: x20  Shoulder ADduction: x20  Horizontal ABduction: x20  Horizontal ADduction: x20  Elbow Flexion: x20  Elbow Extension: x20           Neuromuscular Education  Neuromuscular education: Yes  Other: OT applied ice to RUE - pt reports sensation in hand/wrist as well as scapular region, biceps, and wrist extensors. Pt denies sensation in triceps and wrist flexors. Noted sensory improvements this date compared to yesterday's session. ADL  Equipment Provided: Sock aid;Reacher(trialled soft sock aid)  Feeding: NPO(PEG tube)  Grooming: Moderate assistance(Face w/set-up; A to brush back/R side of hair after washing)  UE Bathing: Minimal assistance(A for LUE; A to support RUE while pt washed it)  LE Bathing: Maximum assistance(Assist B feet/lower legs & perineal area)  UE Dressing: Moderate assistance(to don hospital gown this date)  LE Dressing: Maximum assistance(Pt assisted w/pulling up pants on L side; Doffed socks w/AE)  Toileting: Dependent/Total(Incontinent in brief; TA for hygiene and brief change)  Additional Comments: OT faciliated Pt's brief change while supine while Pt engaged in rolling to R with Mod A and to L with Max A. Pt engaged in UBB and UBD while seated EOB to promote just-right challenge for Pt's sitting balance. Pt sat EOB for ~10-12 minutes with SBA, however Pt became increasingly fatigued as time progressed with R lateral lean noted. OT provided Min VCs to correct to midline sitting with Pt able to self-correct with cueing. OT facilitated Pt's engagement in UBB while seated EOB with Min VCs for elba-bathing techniques with Fair(-) return. Pt donned hospital gown with Mod A to thread RUE.  After completing UB tasks, Pt engaged in transfer to w/c with use of Moser Munda and Mod A x 1 from OT. Pt engaged in LB bathing and dressing while seated in w/c at sink. Please see above for details. OT provided education and demonstration regarding use of soft sock aid to don B socks. Questionable success with sock aid as OT provided Max assist to facilitate use. Trial again tomorrow to assess functional use. OT facilitated Pt's engagement in grooming task of washing his hair and brushing his hair with Mod A for back of head and R lateral head. Continue OT POC to maximize Pt's engagement and IND with self-care tasks. Assessment  Performance deficits / Impairments: Decreased functional mobility ; Decreased ADL status; Decreased ROM; Decreased strength;Decreased endurance;Decreased sensation;Decreased balance;Decreased fine motor control;Decreased coordination;Decreased posture  Prognosis: Good  Discharge Recommendations: 24 hour supervision or assist  Activity Tolerance: Patient Tolerated treatment well  Safety Devices in place: Yes  Type of devices: Patient at risk for falls;Gait belt;Left in chair;Call light within reach;Nurse notified          Patient Education: OT POC, sitting balance, purpose of neuromuscular re-education, sock aid, safety with self-care and functional transfers  Patient Goals   Patient goals : Pt with expressive aphasia - unable to state goals, but agreeable to OT goals of increasing IND and addressing function of RUE. Demo'd agreement with a thumbs up.   Learner:patient  Method: demonstration and explanation       Outcome: needs reinforcement        Plan  Plan  Times per week: 5-7  Times per day: Twice a day  Current Treatment Recommendations: Functional Mobility Training, Endurance Training, Safety Education & Training, Equipment Evaluation, Education, & procurement, Patient/Caregiver Education & Training, Self-Care / ADL, Strengthening, ROM, Balance Training, Neuromuscular Re-education, Positioning  Patient Goals   Patient goals : Pt

## 2020-05-07 NOTE — PROGRESS NOTES
apraxia and reduced motor planning on swallow function. Pt. Brian Prudent in understanding. Plan:  [x] Continue ST services    [] Discharge from ST:      Discharge recommendations: [] Inpatient Rehab   [] [place]   [] Outpatient Therapy  [] Follow up at trauma clinic   [] Other:       Treatment completed by: Rach Farmer A.CCC/SLP

## 2020-05-08 LAB
GLUCOSE BLD-MCNC: 112 MG/DL (ref 75–110)
GLUCOSE BLD-MCNC: 134 MG/DL (ref 75–110)

## 2020-05-08 PROCEDURE — 92507 TX SP LANG VOICE COMM INDIV: CPT

## 2020-05-08 PROCEDURE — 97110 THERAPEUTIC EXERCISES: CPT

## 2020-05-08 PROCEDURE — 6370000000 HC RX 637 (ALT 250 FOR IP): Performed by: STUDENT IN AN ORGANIZED HEALTH CARE EDUCATION/TRAINING PROGRAM

## 2020-05-08 PROCEDURE — 97530 THERAPEUTIC ACTIVITIES: CPT

## 2020-05-08 PROCEDURE — 6370000000 HC RX 637 (ALT 250 FOR IP): Performed by: PSYCHIATRY & NEUROLOGY

## 2020-05-08 PROCEDURE — 99231 SBSQ HOSP IP/OBS SF/LOW 25: CPT | Performed by: PHYSICAL MEDICINE & REHABILITATION

## 2020-05-08 PROCEDURE — 99211 OFF/OP EST MAY X REQ PHY/QHP: CPT

## 2020-05-08 PROCEDURE — 97116 GAIT TRAINING THERAPY: CPT

## 2020-05-08 PROCEDURE — 97112 NEUROMUSCULAR REEDUCATION: CPT

## 2020-05-08 PROCEDURE — 82947 ASSAY GLUCOSE BLOOD QUANT: CPT

## 2020-05-08 PROCEDURE — 99231 SBSQ HOSP IP/OBS SF/LOW 25: CPT | Performed by: INTERNAL MEDICINE

## 2020-05-08 PROCEDURE — 6370000000 HC RX 637 (ALT 250 FOR IP): Performed by: INTERNAL MEDICINE

## 2020-05-08 PROCEDURE — 6370000000 HC RX 637 (ALT 250 FOR IP): Performed by: PHYSICAL MEDICINE & REHABILITATION

## 2020-05-08 PROCEDURE — 1180000000 HC REHAB R&B

## 2020-05-08 PROCEDURE — 97542 WHEELCHAIR MNGMENT TRAINING: CPT

## 2020-05-08 PROCEDURE — 97535 SELF CARE MNGMENT TRAINING: CPT

## 2020-05-08 RX ADMIN — APIXABAN 5 MG: 5 TABLET, FILM COATED ORAL at 09:22

## 2020-05-08 RX ADMIN — LISINOPRIL 20 MG: 20 TABLET ORAL at 09:22

## 2020-05-08 RX ADMIN — TRAZODONE HYDROCHLORIDE 50 MG: 50 TABLET ORAL at 19:52

## 2020-05-08 RX ADMIN — ATORVASTATIN CALCIUM 40 MG: 40 TABLET, FILM COATED ORAL at 19:52

## 2020-05-08 RX ADMIN — APIXABAN 5 MG: 5 TABLET, FILM COATED ORAL at 19:52

## 2020-05-08 RX ADMIN — ASPIRIN 81 MG 81 MG: 81 TABLET ORAL at 09:22

## 2020-05-08 ASSESSMENT — PAIN SCALES - GENERAL: PAINLEVEL_OUTOF10: 0

## 2020-05-08 NOTE — PROGRESS NOTES
Phonemic production approximation increased. Pt. Stating \"bye\"  as ST leaving room and \"hello? \" when ST knocked on door. Pt. Attempted singing \"you are my sunshine\" with ST, pt. Only singing gerda on \"whoa\". Pt. Initiated singing another song, but ST unable to recogngize. Other:       Plan:  [x] Continue ST services    [] Discharge from ST:      Discharge recommendations: [] Inpatient Rehab   [] [place]   [] Outpatient Therapy  [] Follow up at trauma clinic   [] Other:       Treatment completed by: Norman Mccollum. NALDO/SLP

## 2020-05-08 NOTE — PROGRESS NOTES
ST joined pt. In singing, pt. Phonemic production approximation increased. Pt. Stating \"bye\" when cued as ST leaving room. Other:   Pt. Unable to elicit dry swallow on command. Pt. Joanna Rueda. Re: effects of apraxia and reduced motor planning on swallow function. Pt. Dean Crandall in understanding. Plan:  [x] Continue ST services    [] Discharge from ST:      Discharge recommendations: [] Inpatient Rehab   [] [place]   [] Outpatient Therapy  [] Follow up at trauma clinic   [] Other:       Treatment completed by: Nancy Silva A.CCC/SLP

## 2020-05-08 NOTE — PROGRESS NOTES
Physical Therapy  Facility/Department: [facility] ACUTE REHAB  Daily Treatment Note  NAME: Deandre Roman  : 1950  MRN: [id number]    Date of Service: 2020    Discharge Recommendations:  Patient would benefit from continued therapy after discharge, 24 hour supervision or assist, Home with Home health PT   PT Equipment Recommendations  Equipment Needed: Yes(TBD)    Assessment   Body structures, Functions, Activity limitations: Decreased functional mobility ; Decreased strength;Decreased endurance;Decreased sensation;Decreased balance  Treatment Diagnosis: Impaired fucntion. Barriers to Learning: Aphasia  REQUIRES PT FOLLOW UP: Yes  Activity Tolerance  Activity Tolerance: Patient limited by endurance; Patient limited by fatigue;Treatment limited secondary to medical complications (free text)(Aphasia)     Patient Diagnosis(es): There were no encounter diagnoses. has a past medical history of Arterial ischemic stroke, MCA (middle cerebral artery), left, acute ([address] Atrial fibrillation ([facility] [address] and Stroke ([facility] [address] has a past surgical history that includes Gastrostomy tube placement (2020) and Upper gastrointestinal endoscopy (N/A, 2020). Restrictions  Restrictions/Precautions  Restrictions/Precautions: Fall Risk, Up as Tolerated(PEG tube)  Required Braces or Orthoses?: Yes  Required Braces or Orthoses  Other: Abdominal Binder  Subjective   General  Chart Reviewed: Yes  Additional Pertinent Hx: Admitted 20 for R side weakness and aphasia. has emergent mechanical thrombectomy 20. On 20 pt had EGD with PEG tube inserted, admitted to rehab on 5/3/20. Response To Previous Treatment: Patient with no complaints from previous session. Family / Caregiver Present: No  Subjective  Subjective: Patient very motivated to improve with therapy.    Pain Screening  Patient Currently in Pain: Denies  Vital Signs  Patient Currently in Pain: Denies       Objective   Bed mobility  Rolling to Left: Maximum

## 2020-05-08 NOTE — PROGRESS NOTES
encouraged use of mirror during exercises, which assisted pt. In providing visual feedback, along with visual and auditory cues:  Lingual lateralization x10   Lingual depression x10  Lingual elevation x10   labial lateralization x5 with no R sided movement. Labial protrusion x10.     Pt. Able to imitate /m/,  \"mow\", \"moo\", \"me\", \"may\", \"my\", \"bee\", \"bay\", \"bye\", \"bow\" and \"alvarez\"  with sentence completion, visual and verbal cues. Pt. Very stimulable with use of sentence completion cues.      Pt. Able to state name p ST model with carrier phrase and gerda, \"My name is . ..... Little Savin \", unable to state last name. Pt. Then initiated singing the song \"Bingo\". When ST joined pt. In singing, pt. Phonemic production approximation increased. Pt. Often gesturing to communicate throughout session, when given carrier phrase cue, \"shut the . .... Little Savin Little Savin \" pt. Verbalized \"door\".       Other:   Pt. Unable to elicit dry swallow on command. Pt. [facility]. Re: effects of apraxia and reduced motor planning on swallow function. Pt. Jadne Munroe in understanding. Objective:  BP (!) 144/85   Pulse 69   Temp 98.1 °F (36.7 °C) (Oral)   Resp 20   Ht 5' 10\" (1.778 m)   Wt (!) 320 lb (145.2 kg)   SpO2 96%   BMI 45.92 kg/m²       GEN: Well developed, well nourished, in NAD  HEENT:  NCAT.  PERRL.  EOMI.  Mucous membranes pink and moist.   PULM:  Clear to ausculation. No rales or rhonchi. Respirations WNL and unlabored. CV:  Regular rate rhythm.  No murmurs or gallops. GI:  Abdomen soft. Obese, Nontender. Non-distended.  BS + and equal.    NEUROLOGICAL: Alert and interactive. Following commands. Impaired speech with persistent broken words. Sensation intact to light touch. Stable Impaired R CN VII.   MSK:  Functional ROM LUE and LLE, Flaccid RUE and RLE. Motor testing 4+/5 key muscles LUE and 4/5 key muscles LLE. R shoulder 1/5, R arm/hand 0/5. R hip flexor muscles 1/5.     SKIN: Warm dry and intact.  Good turgor except distal BLE chronic skin changes and dryness. EXTREMITIES:  No calf tenderness to palpation. Chronic edema distal BLEs. .    PSYCH: Mood WNL. Appropriately interactive. Affect WNL.     Diagnostics:     CT OF THE HEAD WITHOUT CONTRAST  5/7/2020 1:18 pm       TECHNIQUE:   CT of the head was performed without the administration of intravenous   contrast. Dose modulation, iterative reconstruction, and/or weight based   adjustment of the mA/kV was utilized to reduce the radiation dose to as low   as reasonably achievable.       COMPARISON:   Prior studies most recent 05/05/2020.       HISTORY:   ORDERING SYSTEM PROVIDED HISTORY: follow up after restarting anti coagulation. TECHNOLOGIST PROVIDED HISTORY:   follow up after restarting anti coagulation.       Reason for Exam: patient is not able to give history   Acuity: Unknown   Type of Exam: Unknown       FINDINGS:   BRAIN/VENTRICLES: Minimal motion/streak artifact is present on the   examination.  The ventricular system is unchanged in appearance.  No evidence   of mass effect or midline shift.  Again noted is abnormal low-attenuation   associated with recent infarction in region of distribution of left middle   cerebral artery.  No new area of abnormal attenuation of brain parenchyma is   identified.  There are no foci within visualized brain parenchyma to suggest   presence of acute intracranial hemorrhage.  No abnormal extra-axial fluid   collections are identified.       ORBITS: The visualized portion of the orbits demonstrate no acute abnormality.       SINUSES: The visualized paranasal sinuses and mastoid air cells demonstrate   no acute abnormality.  Rounded low-attenuation lesion within left maxillary   sinus is again identified which may represent mucous retention cyst and/or   polyp, unchanged.       SOFT TISSUES/SKULL:  No acute abnormality of the visualized skull or soft   tissues.           Impression   1.  Persistent findings consistent with changes related to recent infarction

## 2020-05-08 NOTE — PROGRESS NOTES
pm to 6 am; bolus 300 mL x 3 daily    · Volume (ml/day): 1800 mL/day  · Duration: Continuous, Bolus  · Water Flushes: 200 mL x 4 daily; 30 mL before and after each bolus feeding  · Current TF & Flush Orders Provides: 2160 kcal, 135 gm protein, 2440 mL free fluid  · Anthropometric Measures:  · Ht: 5' 10\" (177.8 cm)   · Current Body Wt: 320 lb (145.2 kg)  · Admission Body Wt: 320 lb (145.2 kg)  · Ideal Body Wt: 166 lb (75.3 kg), % Ideal Body 193%  · BMI Classification: BMI > or equal to 40.0 Obese Class III    Nutrition Interventions:   Continue NPO(modify free water flushes)  Continued Inpatient Monitoring    Nutrition Evaluation:   · Evaluation: Progressing toward goals   · Goals: Enteral feeding will meet greater than 85% of estimated nutrition needs   · Monitoring: Weight, Pertinent Labs, Monitor Bowel Function, I&O, Skin Integrity, TF Tolerance, TF Intake, Chewing/Swallowing    Some areas of assessment may be incomplete due to standard COVID-19 Precautions. Valdez Donnelly R.D., L.D.   Phone: [phone]

## 2020-05-08 NOTE — PLAN OF CARE
Problem: Falls - Risk of:  Goal: Will remain free from falls  Description: Will remain free from falls  5/8/2020 1832 by Vaishali Aguilar RN  Outcome: Ongoing  5/8/2020 0732 by Merly Joaquin RN  Outcome: Met This Shift  Note: No falls noted this shift. Bed kept in low position. Safe environment maintained. Bedside table & call light in reach on patient's left side. Uses call light appropriately when needing assistance. Goal: Absence of physical injury  Description: Absence of physical injury  5/8/2020 1832 by Vaishali Aguilar RN  Outcome: Ongoing  5/8/2020 0732 by Merly Joaquin RN  Outcome: Met This Shift     Problem: Activity:  Goal: Ability to tolerate increased activity will improve  Description: Ability to tolerate increased activity will improve  5/8/2020 1832 by Vaishali Aguilar RN  Outcome: Ongoing  5/8/2020 0732 by Merly Joaquin RN  Outcome: Ongoing     Problem: Skin Integrity:  Goal: Will show no infection signs and symptoms  Description: Will show no infection signs and symptoms  5/8/2020 1832 by Vaishali Aguilar RN  Outcome: Ongoing  5/8/2020 0732 by Merly Joaquin RN  Outcome: Met This Shift  Goal: Absence of new skin breakdown  Description: Absence of new skin breakdown  5/8/2020 1832 by Vaishali Aguilar RN  Outcome: Ongoing  5/8/2020 0732 by Merly Joaquin RN  Outcome: Met This Shift  Note: Area to coccyx continuing to be monitored. Patient in incontinent of bowel and bladder. Incontinent cleanser used for ashlyn care. Applied orange paste to area. Problem: Nutrition  Goal: Optimal nutrition therapy  5/8/2020 1832 by Vaishali Aguilar RN  Outcome: Ongoing  5/8/2020 0732 by Merly Joaquin RN  Outcome: Ongoing  Note: Tube feeding continuously infused from 2000 through 0600 w/o difficulty. 704 ml in with 150 ml free water flush prior to starting feeding and after disconnecting. No residual. Patient has no c/o gastric upset.

## 2020-05-08 NOTE — PLAN OF CARE
Problem: Falls - Risk of:  Goal: Will remain free from falls  Description: Will remain free from falls  5/8/2020 0732 by Khushi Casiano RN  Outcome: Met This Shift  Note: No falls noted this shift. Bed kept in low position. Safe environment maintained. Bedside table & call light in reach on patient's left side. Uses call light appropriately when needing assistance. 5/7/2020 1832 by Meredith Barrera RN  Outcome: Ongoing  Goal: Absence of physical injury  Description: Absence of physical injury  5/8/2020 0732 by Khushi Casiano RN  Outcome: Met This Shift  5/7/2020 1832 by Meredith Barrera RN  Outcome: Ongoing     Problem: Skin Integrity:  Goal: Will show no infection signs and symptoms  Description: Will show no infection signs and symptoms  5/8/2020 0732 by Khushi Casiano RN  Outcome: Met This Shift  5/7/2020 1832 by Meredith Barrera RN  Outcome: Ongoing  Goal: Absence of new skin breakdown  Description: Absence of new skin breakdown  5/8/2020 0732 by Khushi Casiano RN  Outcome: Met This Shift  Note: Area to coccyx continuing to be monitored. Patient in incontinent of bowel and bladder. Incontinent cleanser used for ashlyn care. Applied orange paste to area. 5/7/2020 1832 by Meredith Barrera RN  Outcome: Ongoing     Problem: Activity:  Goal: Ability to tolerate increased activity will improve  Description: Ability to tolerate increased activity will improve  5/8/2020 0732 by Khushi Casiano RN  Outcome: Ongoing  5/7/2020 1832 by Meredith Barrera RN  Outcome: Ongoing     Problem: Nutrition  Goal: Optimal nutrition therapy  5/8/2020 0732 by Khushi Casiano RN  Outcome: Ongoing  Note: Tube feeding continuously infused from 2000 through 0600 w/o difficulty. 704 ml in with 150 ml free water flush prior to starting feeding and after disconnecting. No residual. Patient has no c/o gastric upset.    5/7/2020 1832 by Meredith Barrera RN  Outcome: Ongoing

## 2020-05-08 NOTE — PROGRESS NOTES
pants up to thigh level. Pt stood with Mod A in Nahum Messier and pulled pants up with Min A for R side. Pt doffed socks with dressing stick and Min A for R foot. OT provided A to don R sock and Mod A for L sock using soft sock-aid. Pt contines to have difficulty coordinating use of AE and elba-techniques. At end of session pt indicated that he needed to urinate, but was unable to control bladder long enough for OT to get Nahum Messier in place. Assessment  Performance deficits / Impairments: Decreased functional mobility ; Decreased ADL status; Decreased ROM; Decreased strength;Decreased endurance;Decreased sensation;Decreased balance;Decreased fine motor control;Decreased coordination;Decreased posture  Prognosis: Good  Discharge Recommendations: 24 hour supervision or assist  Activity Tolerance: Patient Tolerated treatment well  Safety Devices in place: Yes  Type of devices: Patient at risk for falls;Gait belt;Left in chair;Call light within reach    Patient Education:  Patient Goals   Patient goals : Pt with expressive aphasia - unable to state goals, but agreeable to OT goals of increasing IND and addressing function of RUE. Demo'd agreement with a thumbs up. Purpose/goals of e-stim to RUE, ADL/AE training including elba-techniques, transfer training, activity promotion  Learner:patient  Method: demonstration and explanation       Outcome: needs reinforcement     Plan  Plan  Times per week: 5-7  Times per day: Twice a day  Current Treatment Recommendations: Functional Mobility Training, Endurance Training, Safety Education & Training, Equipment Evaluation, Education, & procurement, Patient/Caregiver Education & Training, Self-Care / ADL, Strengthening, ROM, Balance Training, Neuromuscular Re-education, Positioning  Patient Goals   Patient goals : Pt with expressive aphasia - unable to state goals, but agreeable to OT goals of increasing IND and addressing function of RUE.   Demo'd agreement with a thumbs

## 2020-05-09 LAB
GLUCOSE BLD-MCNC: 115 MG/DL (ref 75–110)
GLUCOSE BLD-MCNC: 117 MG/DL (ref 75–110)

## 2020-05-09 PROCEDURE — 92507 TX SP LANG VOICE COMM INDIV: CPT

## 2020-05-09 PROCEDURE — 99232 SBSQ HOSP IP/OBS MODERATE 35: CPT | Performed by: PHYSICAL MEDICINE & REHABILITATION

## 2020-05-09 PROCEDURE — 6370000000 HC RX 637 (ALT 250 FOR IP): Performed by: PHYSICAL MEDICINE & REHABILITATION

## 2020-05-09 PROCEDURE — 6370000000 HC RX 637 (ALT 250 FOR IP): Performed by: INTERNAL MEDICINE

## 2020-05-09 PROCEDURE — 99232 SBSQ HOSP IP/OBS MODERATE 35: CPT | Performed by: INTERNAL MEDICINE

## 2020-05-09 PROCEDURE — 82947 ASSAY GLUCOSE BLOOD QUANT: CPT

## 2020-05-09 PROCEDURE — 6370000000 HC RX 637 (ALT 250 FOR IP): Performed by: STUDENT IN AN ORGANIZED HEALTH CARE EDUCATION/TRAINING PROGRAM

## 2020-05-09 PROCEDURE — 6370000000 HC RX 637 (ALT 250 FOR IP): Performed by: PSYCHIATRY & NEUROLOGY

## 2020-05-09 PROCEDURE — 1180000000 HC REHAB R&B

## 2020-05-09 RX ADMIN — APIXABAN 5 MG: 5 TABLET, FILM COATED ORAL at 08:22

## 2020-05-09 RX ADMIN — APIXABAN 5 MG: 5 TABLET, FILM COATED ORAL at 20:55

## 2020-05-09 RX ADMIN — ATORVASTATIN CALCIUM 40 MG: 40 TABLET, FILM COATED ORAL at 20:55

## 2020-05-09 RX ADMIN — LISINOPRIL 20 MG: 20 TABLET ORAL at 08:21

## 2020-05-09 RX ADMIN — TRAZODONE HYDROCHLORIDE 50 MG: 50 TABLET ORAL at 20:55

## 2020-05-09 RX ADMIN — ASPIRIN 81 MG 81 MG: 81 TABLET ORAL at 08:21

## 2020-05-09 ASSESSMENT — PAIN SCALES - GENERAL: PAINLEVEL_OUTOF10: 0

## 2020-05-09 NOTE — PROGRESS NOTES
[address]  Acute Rehabilitation Physical Therapy Progress Note    Date: 20  Patient Name: Yossi Donahue       Room: 4209/0960-51  MRN: [id number]   Account: [de-identified]   : 1950  (78 y.o.) Gender: male     Referring Practitioner: Dr. Ivanna Ybarar  Diagnosis: L MCA ischemic stroke, s/p emergent mechanical thrombectomy 20  Past Medical History:  has a past medical history of Arterial ischemic stroke, MCA (middle cerebral artery), left, acute ([address] Atrial fibrillation ([facility] [address] and Stroke ([facility] [address] Past Surgical History:   has a past surgical history that includes Gastrostomy tube placement (2020) and Upper gastrointestinal endoscopy (N/A, 2020). Additional Pertinent Hx: Admitted 20 for R side weakness and aphasia. has emergent mechanical thrombectomy 20. On 20 pt had EGD with PEG tube inserted, admitted to rehab on 5/3/20. Restrictions/Precautions  Restrictions/Precautions: Fall Risk;Up as Tolerated(PEG tube)  Required Braces or Orthoses?: Yes  Required Braces or Orthoses  Other: Abdominal Binder    Subjective: Pt not able to verbalize with words however pt able to convey points and thoughts with hand gestures and tone in sounds. Comments: Patient very motivated to improve with therapy. Vital Signs  Patient Currently in Pain: Denies            Bed Mobility  Rolling: Moderate assistance;Rolling Left(unable to roll hip over)  Supine to Sit: Maximal assistance(R LE, R UE, & Trunk)  Sit to Supine: Moderate assistance(assisting bilat LE)  Scooting:  Moderate assistance(to HOB, pt able to perform 50%)  Comment: bed flat, 2 pillows, and 1 bedrail      Transfers:  Sit to Stand: Minimal Assistance(Min Ax1 Rhoderick Aures)  Stand to sit: Minimal Assistance(Min Ax1 Rhoderick Aures)  Bed to Chair: Dependent/Total;Moderate assistance;2 Person Assistance(D/T w/[facility] [place] [place])               Stairs/Curb  Stairs?: No              Wheelchair Activities  Propulsion: endurance;Decreased sensation;Decreased balance  Treatment Diagnosis: Impaired function. Prognosis: Good  Barriers to Learning: Aphasic  REQUIRES PT FOLLOW UP: Yes  Discharge Recommendations: Patient would benefit from continued therapy after discharge;24 hour supervision or assist;Home with Home health PT    Goals  Short term goals  Time Frame for Short term goals: 10 days  Short term goal 1: Pt to perform bed mobility/rolling to left at mod A. Short term goal 2: Improve sitting dynamic balance to fair+  Short term goal 3: Pt to perform sit to stand at min/mod A from w/c/bed  Short term goal 4: Pt able to manuever w/c on level surfaces dist of 50 ft with L UE/L LE  Short term goal 5: Pt able to perform supine<>sit at mod A   Short term goal 6: Progress pt to pivot transfers on strong side with 2 person assist.  Long term goals  Time Frame for Long term goals : By DC  Long term goal 1: Pt able to perform bed mobility at min A   Long term goal 2: Pt able to perform sit to stand at min A    Long term goal 3: Pt able to perform transfers at min a (strong side)/mod a (weak side)  Long term goal 4: Pt able to  // bars for 5 to 10 minutes at min/mod A to perform pregait activiites  Long term goal 5: Progress pt to ambulation either in // bars or with appropriate assistive device dist of 20 to 30 ft, mod A, 2nd person CGA. Long term goal 6: Pt able to manuever w/c on level surface, dist of 150 ft, at supervsion level. Long term goal 7: Improve PASS score from 9/36 to 19/36 reduce fall risk.         05/09/20 1058 05/09/20 1345   PT Individual Minutes   Time In 1058 1345   Time Out 1128 1445   Minutes 30 60       Electronically signed by Nathan Padilla PTA on 5/9/20 at 3:46 PM EDT

## 2020-05-09 NOTE — PLAN OF CARE
Problem: Falls - Risk of:  Goal: Will remain free from falls  Description: Will remain free from falls  5/9/2020 1605 by Antonieta Rees RN  Outcome: Ongoing  5/9/2020 0430 by Speedy Padilla RN  Outcome: Met This Shift  Note: No falls noted this shift. Patient able to transfer with Vertell [place] and 1 assist. Bed kept in low position. Safe environment maintained. Bedside table & call light in reach to patient's left side. Uses call light appropriately when needing assistance. Goal: Absence of physical injury  Description: Absence of physical injury  5/9/2020 1605 by Antonieta Rees RN  Outcome: Ongoing  5/9/2020 0430 by Speedy Padilla RN  Outcome: Met This Shift     Problem: Activity:  Goal: Ability to tolerate increased activity will improve  Description: Ability to tolerate increased activity will improve  5/9/2020 1605 by Antonieta Rees RN  Outcome: Ongoing  5/9/2020 0430 by Speedy Padilla RN  Outcome: Ongoing  Note: See physical and occupational therapy notes. Problem: Skin Integrity:  Goal: Will show no infection signs and symptoms  Description: Will show no infection signs and symptoms  5/9/2020 1605 by Antonieta Rees RN  Outcome: Ongoing  5/9/2020 0430 by Speedy Padilla RN  Outcome: Met This Shift  Goal: Absence of new skin breakdown  Description: Absence of new skin breakdown  5/9/2020 1605 by Antonieta Rees RN  Outcome: Ongoing  5/9/2020 0430 by Speedy Padilla RN  Outcome: Met This Shift  Note: Area to coccyx continues to be monitored. Protective barrier cream applied after ashlyn care completed. External catheter applied d/t patient incontinent of bowel and bladder.       Problem: Nutrition  Goal: Optimal nutrition therapy  5/9/2020 1605 by Antonieta Rees RN  Outcome: Ongoing  5/9/2020 0430 by Speedy Padilla RN  Outcome: Ongoing  Note: Patient tolerating tube feeding at 90 ml/hr from hours of 5441-0232 with 200 ml water flushes prior to starting continuous tube feeding at night and after

## 2020-05-09 NOTE — PROGRESS NOTES
aspirin, Lipitor, had CT head   2. Atrial fibrillation, on Eliquis 5 mg twice a day  3. Dysphagia, status post PEG tube placement  4. Hypertension, Controlled . 5. Aspiration pneumonia, completed antibiotics  6 . Tolearting Tube feed   Consultations:   IP CONSULT TO INTERNAL MEDICINE  IP CONSULT TO NEUROLOGY      Jamel Cohn MD  5/8/2020  10:18 PM    Copy sent to Dr. Pearl Valentin MD    Please note that this chart was generated using voice recognition Dragon dictation software. Although every effort was made to ensure the accuracy of this automated transcription, some errors in transcription may have occurred.

## 2020-05-09 NOTE — PROGRESS NOTES
Speech Language Pathology  Speech Language Pathology  [facility]    Speech Language Treatment Note    Date: 5/9/2020  Patients Name: Hunter Byrd  MRN: [id number]  Diagnosis:   Patient Active Problem List   Diagnosis Code    Cerebrovascular accident (CVA) ([facility] [address] I63.9    Acute ischemic left MCA stroke ([facility] [address] I63.512    Bradycardia R00.1    Aspiration pneumonia ([facility] [address] J69.0    Leukocytosis D72.829    Essential hypertension I10    Atrial fibrillation ([facility] [address] I48.91    Chronic acquired lymphedema I89.0    Oropharyngeal dysphagia R13.12    Acute CVA (cerebrovascular accident) ([facility] [address] I63.9       Pain: 0/10    Speech and Language Treatment  Treatment time:  2923-9757  Subjective: [x] Alert [x] Cooperative     [] Confused     [] Agitated      [] Lethargic    Objective/Assessment:  Auditory Comprehension:    Y/n (simple) using gestures (head nod) to response: 100% accuracy ladonna    Verbal Expression:  N/a, pt perseverating on \"whoa whoa\" throughout session    Reading Comprehension: na, visual deficits     Speech:     Pt utilized apraxia application to repeat target vocabulary. Pt repeating target 2 syllable words with 10% accuracy, close approximations of target. Pt perseverating on \"whoa, whoa\" throughout task. Application provides visual feedback. Pt also encouraged to utilize pacing strategy (tapping) with minimal success, max cues provided.      Automatic speech:  Counting to 10: Pt unable to count past one independently, substituting perseverated \"whoa\" for every number  Unable to repeats SAUL with max assist.     Other:     Pt frustrated with wheelchair being in locked position (based on non verbal communication, gestures), explained safety precautions with pt, pt verbalized understanding     Plan:  [x] Continue ST services    [] Discharge from ST:      Discharge recommendations: [] Inpatient Rehab   [] [place]   [] Outpatient Therapy  [] Follow up at trauma clinic   [] Other:

## 2020-05-09 NOTE — PROGRESS NOTES
Lethargic     Objective/Assessment:  Auditory Comprehension:  Pt. Able to follow general directions and answer y/n questions asked.      Verbal Expression:  N/a, pt perseverating on \"whoa\" throughout session, pt did successfully use gesturing to request to sit up prior to session.      Reading Comprehension: na, visual deficits      Speech:      Pt. Able to imitate /m/, in isolation. Pt repeating CV productions using initial /m/ phoneme with 40% percent accuracy. ST unable to provided visual cues due to mask protocols. Attempted use of Zapieraxia application to provided visual feedback. Screen of device too small, pt unable to see the visual provided.      Pt. Named objects around room c 0%, 40% c MAX cues. Pt. Repeated /M/ single syllable words c 60% making approximations of final consonants. Pt. Attempted to count from 1-10, Unable to state numbers 2 through 10, required cues to complete. Pt replacing target letter with perseverated word \"whoa\"     Pt. Has increased production when using finger to side of mouth, for tactile feedback.      Pt. unable to state name p ST model with carrier phrase and gerda, \"My name is . .... Delphina Fiddler Delphina Fiddler \".       Pt. Attempted singing \"happy birthday\" with ST, pt. Only singing gerda on \"whoa\" but did produce the word \"happy\" 3x during song.      Other:     Radio placed in room, pt encouraged to listen to radio and hum/sing along. Pt verbalized understanding. Objective:  /65   Pulse 99   Temp 97.6 °F (36.4 °C) (Oral)   Resp 16   Ht 5' 10\" (1.778 m)   Wt (!) 320 lb (145.2 kg)   SpO2 98%   BMI 45.92 kg/m²       GEN: Well developed, well nourished, in NAD  HEENT:  NCAT.  PERRL.  EOMI.  Mucous membranes pink and moist.   PULM:  Clear to ausculation. No rales or rhonchi. Respirations WNL and unlabored. CV:  Regular rate rhythm.  No murmurs or gallops. GI:  Abdomen soft. Obese, Nontender. Non-distended.  BS + and equal.    NEUROLOGICAL: Alert and interactive.  Following

## 2020-05-10 LAB
GLUCOSE BLD-MCNC: 103 MG/DL (ref 75–110)
GLUCOSE BLD-MCNC: 127 MG/DL (ref 75–110)

## 2020-05-10 PROCEDURE — 82947 ASSAY GLUCOSE BLOOD QUANT: CPT

## 2020-05-10 PROCEDURE — 97530 THERAPEUTIC ACTIVITIES: CPT

## 2020-05-10 PROCEDURE — 99232 SBSQ HOSP IP/OBS MODERATE 35: CPT | Performed by: INTERNAL MEDICINE

## 2020-05-10 PROCEDURE — 97542 WHEELCHAIR MNGMENT TRAINING: CPT

## 2020-05-10 PROCEDURE — 97110 THERAPEUTIC EXERCISES: CPT

## 2020-05-10 PROCEDURE — 6370000000 HC RX 637 (ALT 250 FOR IP): Performed by: STUDENT IN AN ORGANIZED HEALTH CARE EDUCATION/TRAINING PROGRAM

## 2020-05-10 PROCEDURE — 6370000000 HC RX 637 (ALT 250 FOR IP): Performed by: INTERNAL MEDICINE

## 2020-05-10 PROCEDURE — 6370000000 HC RX 637 (ALT 250 FOR IP): Performed by: PSYCHIATRY & NEUROLOGY

## 2020-05-10 PROCEDURE — 97116 GAIT TRAINING THERAPY: CPT

## 2020-05-10 PROCEDURE — 1180000000 HC REHAB R&B

## 2020-05-10 PROCEDURE — 97535 SELF CARE MNGMENT TRAINING: CPT

## 2020-05-10 PROCEDURE — 6370000000 HC RX 637 (ALT 250 FOR IP): Performed by: PHYSICAL MEDICINE & REHABILITATION

## 2020-05-10 RX ORDER — PANTOPRAZOLE SODIUM 40 MG/1
40 TABLET, DELAYED RELEASE ORAL
Status: DISCONTINUED | OUTPATIENT
Start: 2020-05-11 | End: 2020-05-10 | Stop reason: CLARIF

## 2020-05-10 RX ORDER — ESOMEPRAZOLE MAGNESIUM 40 MG/1
40 FOR SUSPENSION ORAL DAILY
Status: DISCONTINUED | OUTPATIENT
Start: 2020-05-11 | End: 2020-05-23 | Stop reason: HOSPADM

## 2020-05-10 RX ADMIN — APIXABAN 5 MG: 5 TABLET, FILM COATED ORAL at 08:08

## 2020-05-10 RX ADMIN — APIXABAN 5 MG: 5 TABLET, FILM COATED ORAL at 20:57

## 2020-05-10 RX ADMIN — ASPIRIN 81 MG 81 MG: 81 TABLET ORAL at 08:08

## 2020-05-10 RX ADMIN — LISINOPRIL 20 MG: 20 TABLET ORAL at 08:08

## 2020-05-10 RX ADMIN — TRAZODONE HYDROCHLORIDE 50 MG: 50 TABLET ORAL at 20:58

## 2020-05-10 RX ADMIN — ATORVASTATIN CALCIUM 40 MG: 40 TABLET, FILM COATED ORAL at 20:57

## 2020-05-10 ASSESSMENT — PAIN SCALES - GENERAL
PAINLEVEL_OUTOF10: 0
PAINLEVEL_OUTOF10: 0

## 2020-05-10 NOTE — PROGRESS NOTES
[address]   ACUTE REHABILITATION OCCUPATIONAL THERAPY  DAILY NOTE    Date: 5/10/20  Patient Name: Hunter Byrd      Room: 3482/3705-01    MRN: [id number]   : 1950  (78 y.o.)  Gender: male   Referring Practitioner: Dr. Yesenia Fairbanks  Diagnosis: L MCA ischemic stroke, s/p emergent mechanical thrombectomy 20     Restrictions  Restrictions/Precautions: Fall Risk, Up as Tolerated(PEG tube)  Required Braces or Orthoses  Other: Abdominal Binder  Required Braces or Orthoses?: Yes  Equipment Used: Bed, Wheelchair    Subjective  Comments: AM/PM: Pt supine in bed upon arrival, agreeable to participate. Pt pleasant and cooperative throughout  Patient Currently in Pain: Denies  Pain Level: 0    Restrictions/Precautions: Fall Risk;Up as Tolerated(PEG tube)  Overall Orientation Status: Impaired  Orientation Level: Oriented to person;Disoriented to place; Disoriented to time;Oriented to situation(provided options for year and situation)     Objective  Cognition  Overall Cognitive Status: Impaired  Awareness of Errors: Assistance required to identify errors made  Sequencing and Organization: Assistance required to identify errors made;Assistance required to generate solutions  Balance  Sitting Balance: Contact guard assistance(static/dynamic seated EOB)  Standing Balance: Minimal assistance(AM/PM: standing in SS )     Bed mobility  Rolling to Left: Minimal assistance  Supine to Sit: Moderate assistance(assist for BLE management and trunk progression)  Scooting: Moderate assistance  Transfers  Sit to stand: Moderate assistance(umang steady)  Stand to sit: Moderate assistance( )  Transfer Comments: AM: v/c's to control descent to chair while transitioning from stand to sit, good return. Demonstrated 5x throughout treatment session.  PM: same assistance levels for PM session  Standing Balance  Time: 2 minute intervals, x 5  Activity: standing while RN changed wound dressing and completed perineal hygiene  Comment: writer provided support to keep RUE in place on SS bar  Functional Mobility  Functional - Mobility Device: Wheelchair  Activity: To/from bathroom  Assist Level: Dependent/Total     Type of ROM/Therapeutic Exercise  Type of ROM/Therapeutic Exercise: PROM  Comment: AM: Writer educated pt on self-PROM L hand, emphasizing importance of maintaining thenar web-space to preserve thumb ROM throughout recover, needs reinforcement on technique. PM: continued self-PROM education in PM session. Included wrist flex/ext, elbow flex/ext, and shoulder flexion. Good return for all except shoulder flexion. Needs visual and physical demonstration for reinforcement. Additional Activities: Other  Additional Activities: PM: Pt completed functional reach activity while standing in [employer] steady to increase WB and propioceptive input in RUE/RLE. Writer placed ADL items on table to faciliate reaching anteriorly out of JONH. Writer provided support on L side and facilitated weightbearing while pt completed functional reach with RUE. Pt able to tolerate 2 minutes of standing with minimal to moderate assist in uamng steady before requiring a 60 second rest break, able to completed 4x. No LOB. Pt tolerated well, denied pain throughout. ADL  Feeding: NPO  Grooming: Minimal assistance(completed hair grooming and face hygiene seated sink-side. Writer provided tactile and verbal cueing to direct pt to missed areas behind ears on R side of face)  UE Bathing: (completed seated sink-side. assist for L axillary region.  Writer demonstrated 1-UE technique for UB bathing, needs reinforcement)  LE Bathing: None  UE Dressing: None  LE Dressing: Dependent/Total (required assist to thread shorts over B legs and pull around waist. pt demonstrated increased engagement in tasks this date, however still requires assist for all parts.)  Additional Comments: AM: ADLs completed seated sink-side Assessment  Performance deficits / Impairments: Decreased functional mobility ; Decreased ADL status; Decreased ROM; Decreased strength;Decreased endurance;Decreased sensation;Decreased balance;Decreased fine motor control;Decreased coordination;Decreased posture  Assessment: pt progressing well towards OT goals. Continue skilled OT services to address deficits noted above  Prognosis: Good  Discharge Recommendations: 24 hour supervision or assist  Activity Tolerance: Patient Tolerated treatment well;Patient limited by fatigue  Safety Devices in place: Yes  Type of devices: Patient at risk for falls;Gait belt;Left in chair;Call light within reach  Restraints  Initially in place: No  Equipment Recommendations  Equipment Needed: Yes(TBD)       Patient Education:  Patient Goals   Patient goals : Pt with expressive aphasia - unable to state goals, but agreeable to OT goals of increasing IND and addressing function of RUE. Demo'd agreement with a thumbs up. Learner:patient  Method: demonstration and explanation       Outcome: demonstrated understanding, however still needs reinforcement    OT Education  OT Education: OT Role;Plan of Care;Home Exercise Program  Patient Education: self-PROM wrist, digits, shoulder  needs reinforcement for technique and progression  Barriers to Learning: expressive aphasia     Plan  Plan  Times per week: 5-7  Times per day: Twice a day  Current Treatment Recommendations: Functional Mobility Training, Endurance Training, Safety Education & Training, Equipment Evaluation, Education, & procurement, Patient/Caregiver Education & Training, Self-Care / ADL, Strengthening, ROM, Balance Training, Neuromuscular Re-education, Positioning  Patient Goals   Patient goals : Pt with expressive aphasia - unable to state goals, but agreeable to OT goals of increasing IND and addressing function of RUE. Demo'd agreement with a thumbs up. Short term goals  Time Frame for Short term goals:  One Week  Short

## 2020-05-10 NOTE — PROGRESS NOTES
Dependent/Total;2 Person Assistance(Standing with [place])              Ambulation 1  Surface: level tile  Device: (walker Lift)  Other Apparatus: Wheelchair follow  Assistance: Maximum assistance; Moderate assistance;2 Person assistance(Max Ax1 on right Side, Mod Ax1 on Left Side)  Quality of Gait: tactile and vc's for weight shifting and advancing right LE while maintaining extension of right knee during stance phase, assist with right LE during swing phase at times, fatigues quickly with 1 standing Rest break  Gait Deviations: Slow Catherine;Decreased step length;Decreased step height; Increased JONH; Deviated path  Distance: 27ft  Comments: No LOB, heavy pusher to right side needing more Max Ax1 on right side, vc's for sequencing        Stairs/Curb  Stairs?: No              Wheelchair Activities  Propulsion: Yes  Propulsion 1  Propulsion: Manual  Level: Level Tile  Method: LLE;LUE  Level of Assistance: Minimal assistance  Description/ Details: Patient managed straight path with minimal assist to initiate rolling, VCs to avoid hitting obstacles, minimal assist to make 180* turn with increased time. Therapist locked/unlocked B posterior breaks.    Distance: 150ft            BALANCE Posture: Fair  Sitting - Static: Fair(seated Edge of w/c)  Sitting - Dynamic: Fair;-(Seated Edge of w/c)  Standing - Static: Fair(standing with Walker Lift)  Standing - Dynamic: (NT)    EXERCISES    Other exercises?: Yes  Other exercises 1: Seated bilat LE therex, x15 on left LE with 3lb ankle weight, no weight on right, Right Side P/AAROM x15  Other exercises 2: Purple Tband to left LE  Other exercises 3: Sit to Stand x2 in //bars  Other exercises 4: Pre-gait weight shifting in //bars  Other exercises 5: Bed Mobility x1  Other exercises 6: Transfers sit to stand/umang stedy  Other exercises 7: Family education to pt and pt's son Lexis Ledbetter who visited pt, pt's son reports will be working on new housing arrangements for pt before pt leaves, pt's

## 2020-05-11 ENCOUNTER — APPOINTMENT (OUTPATIENT)
Dept: GENERAL RADIOLOGY | Age: 70
DRG: 056 | End: 2020-05-11
Attending: PHYSICAL MEDICINE & REHABILITATION
Payer: MEDICARE

## 2020-05-11 LAB
ALBUMIN SERPL-MCNC: 3.2 G/DL (ref 3.5–5.2)
ANION GAP SERPL CALCULATED.3IONS-SCNC: 11 MMOL/L (ref 9–17)
BUN BLDV-MCNC: 36 MG/DL (ref 8–23)
BUN/CREAT BLD: ABNORMAL (ref 9–20)
CALCIUM SERPL-MCNC: 8.8 MG/DL (ref 8.6–10.4)
CHLORIDE BLD-SCNC: 103 MMOL/L (ref 98–107)
CO2: 25 MMOL/L (ref 20–31)
CREAT SERPL-MCNC: 1 MG/DL (ref 0.7–1.2)
GFR AFRICAN AMERICAN: >60 ML/MIN
GFR NON-AFRICAN AMERICAN: >60 ML/MIN
GFR SERPL CREATININE-BSD FRML MDRD: ABNORMAL ML/MIN/{1.73_M2}
GFR SERPL CREATININE-BSD FRML MDRD: ABNORMAL ML/MIN/{1.73_M2}
GLUCOSE BLD-MCNC: 127 MG/DL (ref 75–110)
GLUCOSE BLD-MCNC: 154 MG/DL (ref 75–110)
GLUCOSE BLD-MCNC: 170 MG/DL (ref 70–99)
HCT VFR BLD CALC: 40.2 % (ref 41–53)
HEMOGLOBIN: 13.3 G/DL (ref 13.5–17.5)
MCH RBC QN AUTO: 28.8 PG (ref 26–34)
MCHC RBC AUTO-ENTMCNC: 33.1 G/DL (ref 31–37)
MCV RBC AUTO: 87.2 FL (ref 80–100)
NRBC AUTOMATED: ABNORMAL PER 100 WBC
PDW BLD-RTO: 15 % (ref 11.5–14.9)
PLATELET # BLD: 220 K/UL (ref 150–450)
PMV BLD AUTO: 10.3 FL (ref 6–12)
POTASSIUM SERPL-SCNC: 4.6 MMOL/L (ref 3.7–5.3)
RBC # BLD: 4.61 M/UL (ref 4.5–5.9)
SODIUM BLD-SCNC: 139 MMOL/L (ref 135–144)
WBC # BLD: 11.2 K/UL (ref 3.5–11)

## 2020-05-11 PROCEDURE — 36415 COLL VENOUS BLD VENIPUNCTURE: CPT

## 2020-05-11 PROCEDURE — 82947 ASSAY GLUCOSE BLOOD QUANT: CPT

## 2020-05-11 PROCEDURE — 93971 EXTREMITY STUDY: CPT

## 2020-05-11 PROCEDURE — 6370000000 HC RX 637 (ALT 250 FOR IP): Performed by: PHYSICAL MEDICINE & REHABILITATION

## 2020-05-11 PROCEDURE — 6370000000 HC RX 637 (ALT 250 FOR IP): Performed by: PSYCHIATRY & NEUROLOGY

## 2020-05-11 PROCEDURE — 92507 TX SP LANG VOICE COMM INDIV: CPT

## 2020-05-11 PROCEDURE — 97110 THERAPEUTIC EXERCISES: CPT

## 2020-05-11 PROCEDURE — 6370000000 HC RX 637 (ALT 250 FOR IP): Performed by: INTERNAL MEDICINE

## 2020-05-11 PROCEDURE — 73501 X-RAY EXAM HIP UNI 1 VIEW: CPT

## 2020-05-11 PROCEDURE — 99232 SBSQ HOSP IP/OBS MODERATE 35: CPT | Performed by: INTERNAL MEDICINE

## 2020-05-11 PROCEDURE — 97112 NEUROMUSCULAR REEDUCATION: CPT

## 2020-05-11 PROCEDURE — 82040 ASSAY OF SERUM ALBUMIN: CPT

## 2020-05-11 PROCEDURE — 80048 BASIC METABOLIC PNL TOTAL CA: CPT

## 2020-05-11 PROCEDURE — 99232 SBSQ HOSP IP/OBS MODERATE 35: CPT | Performed by: PHYSICAL MEDICINE & REHABILITATION

## 2020-05-11 PROCEDURE — 6370000000 HC RX 637 (ALT 250 FOR IP): Performed by: STUDENT IN AN ORGANIZED HEALTH CARE EDUCATION/TRAINING PROGRAM

## 2020-05-11 PROCEDURE — 97535 SELF CARE MNGMENT TRAINING: CPT

## 2020-05-11 PROCEDURE — 97530 THERAPEUTIC ACTIVITIES: CPT

## 2020-05-11 PROCEDURE — 1180000000 HC REHAB R&B

## 2020-05-11 PROCEDURE — 85027 COMPLETE CBC AUTOMATED: CPT

## 2020-05-11 RX ORDER — SULFAMETHOXAZOLE AND TRIMETHOPRIM 800; 160 MG/1; MG/1
1 TABLET ORAL EVERY 12 HOURS SCHEDULED
Status: DISCONTINUED | OUTPATIENT
Start: 2020-05-11 | End: 2020-05-11

## 2020-05-11 RX ORDER — SULFAMETHOXAZOLE AND TRIMETHOPRIM 800; 160 MG/1; MG/1
1 TABLET ORAL EVERY 12 HOURS
Status: COMPLETED | OUTPATIENT
Start: 2020-05-11 | End: 2020-05-18

## 2020-05-11 RX ADMIN — SULFAMETHOXAZOLE AND TRIMETHOPRIM 1 TABLET: 800; 160 TABLET ORAL at 23:29

## 2020-05-11 RX ADMIN — APIXABAN 5 MG: 5 TABLET, FILM COATED ORAL at 08:54

## 2020-05-11 RX ADMIN — LISINOPRIL 20 MG: 20 TABLET ORAL at 08:54

## 2020-05-11 RX ADMIN — ESOMEPRAZOLE MAGNESIUM 40 MG: 40 GRANULE, DELAYED RELEASE ORAL at 10:30

## 2020-05-11 RX ADMIN — TRAZODONE HYDROCHLORIDE 50 MG: 50 TABLET ORAL at 23:30

## 2020-05-11 RX ADMIN — APIXABAN 5 MG: 5 TABLET, FILM COATED ORAL at 23:30

## 2020-05-11 RX ADMIN — ATORVASTATIN CALCIUM 40 MG: 40 TABLET, FILM COATED ORAL at 23:30

## 2020-05-11 RX ADMIN — ASPIRIN 81 MG 81 MG: 81 TABLET ORAL at 08:54

## 2020-05-11 RX ADMIN — ACETAMINOPHEN 650 MG: 325 TABLET, FILM COATED ORAL at 08:54

## 2020-05-11 ASSESSMENT — PAIN DESCRIPTION - PROGRESSION: CLINICAL_PROGRESSION: GRADUALLY WORSENING

## 2020-05-11 ASSESSMENT — PAIN SCALES - GENERAL
PAINLEVEL_OUTOF10: 5
PAINLEVEL_OUTOF10: 7
PAINLEVEL_OUTOF10: 7
PAINLEVEL_OUTOF10: 8

## 2020-05-11 ASSESSMENT — PAIN DESCRIPTION - PAIN TYPE
TYPE: ACUTE PAIN
TYPE: ACUTE PAIN

## 2020-05-11 ASSESSMENT — PAIN DESCRIPTION - ORIENTATION
ORIENTATION: LEFT
ORIENTATION: LEFT

## 2020-05-11 ASSESSMENT — PAIN DESCRIPTION - LOCATION
LOCATION: LEG
LOCATION: LEG

## 2020-05-11 NOTE — PATIENT CARE CONFERENCE
[address] [facility]    ACUTE REHABILITATION  TEAM CONFERENCE NOTE  Date: 20  Patient Name: Meryle Lade       Room: 8964/3178-44  MRN: [id number]       : 1950  (78 y.o.)     Gender: male       Acute CVA (cerebrovascular accident) [facility]) [I63.9]  Diagnosis: L MCA ischemic stroke, s/p emergent mechanical thrombectomy 20     NURSING  Bladder  Continent  Bowel   Incontintent  Intervention    Both Bowel & Bladder Program     Wounds/Incisions/Ulcers: Wounds present on - groin and buttock  Medication Education Program: Patient currently unable to manage medications and family being educated  Pain: Patient's pain is currently controlled with -  tylenol 650mg Q4hr prn    Fall Risk:  Falling star program initiated    PHYSICAL THERAPY  Bed mobility  Bridging: Moderate assistance(right buttocks clears bed, left buttocks doesn't)  Supine to Sit: Moderate assistance  Sit to Supine: Moderate assistance  Scooting: Moderate assistance  Comment: Increased assist this date 2* to pain     Transfers:  Sit to Stand: Moderate Assistance(with umang lora 2 *to pain )  Stand to sit: Moderate Assistance  Bed to Chair: Dependent/Total;2 Person Assistance(with umang loar 2* to pain )    PT UNABLE TO AMBULATE OR PERFORM W/C MOBILITY 20 DUE TO PAIN L LE. Ambulation 1 (5/10/20)  Surface: level tile  Device: (walker Lift)  Other Apparatus: Wheelchair follow  Assistance: Maximum assistance; Moderate assistance;2 Person assistance(Max Ax1 on right Side, Mod Ax1 on Left Side)  Quality of Gait: tactile and vc's for weight shifting and advancing right LE while maintaining extension of right knee during stance phase, assist with right LE during swing phase at times, fatigues quickly with 1 standing Rest break  Gait Deviations: Slow Catherine;Decreased step length;Decreased step height; Increased JONH; Deviated path  Distance: 27ft  Comments: No LOB, heavy pusher to right side needing more Max Ax1 on right side, vc's for Bathing: Minimal assistance(A for LUE and support to RUE while pt washes it)  LE Bathing: Dependent/Total(Limited ADL done in supine today.)   Upper Body Dressing   3  Assistance Needed: Partial/moderate assistance       (Min A for hospital gown; No clothing available)   Lower Body Dressing   88     Pt seen bedside this date 2* new onset pain in LLE and x-ray/dopplers ordered. LBD not addressed this date. Putting On/Taking Off Footwear   88     Pt seen bedside this date 2* new onset pain in LLE and x-ray/dopplers ordered. LBD not addressed this date. None(Pt seen bedside, LBD not addressed this date) 1  Toilet Transfer   80  (dependent prior sessions)       Toileting Hygiene   1  Assistance Needed: Dependent       Dependent/Total (Incontinent of BM, TA for clean-up; SBA for use of HH urinal)    Bed mobility  Rolling to Left: Minimal assistance  Rolling to Right: Minimal assistance  Supine to Sit: Moderate assistance  Sit to Supine: Moderate assistance  Scooting: Moderate assistance(for R side at EOB)      Balance  Sitting Balance: Stand by assistance(Static sitting EOB)    Equipment Recommendations  Equipment Needed: Yes(TBD)  Assessment: pt progressing well towards OT goals. Continue skilled OT services to address deficits noted above    Short term goals  Time Frame for Short term goals: One Week  Short term goal 1: Pt will complete eating (if swallowing improves for PO intake)/grooming with Min A using modified techniques as needed. Short term goal 2: Pt will complete UB bathing with Min A and LB bathing with Mod A and Good safety using AE/modified techniques as needed. Short term goal 3: Pt will complete UB dressing with Mod A and LB dressing with Max A using AE/modified techniques as needed. Short term goal 4: Pt will complete toilet transfer with Max A x1 and Good safety using appropriate DME.   Short term goal 5: Pt will stand for 2-3 minutes with 1-2 UE support and Min A while participating in a AE/modified techniques as needed. Long term goal 3: Pt will complete functional transfers with Min-Mod A and Good safety using appropriate DME. Long term goal 4: Pt will demo IND with appropriate HEP for RUE ROM/strength/coordination. ST:  Mod A expression, diet at least restrictive consistency. Team Members Present at Conference:  :  Raman MANCUSOW  Occupational Therapist: Bro Eduardo OT   [address] PT  Speech Therapist: Gerald EVANSCCC/SLP  Nurse:  Hobson Brittle RN   Recreation Therapy: Yoana Velásquez  Dietary/Nutrition: Solo Grigsby RD LD  Pastoral Care: Jaleesa Conde  CMG:   Jose Terry RN     I approve the established interdisciplinary plan of care as documented within the medical record of Jono Mallory.     Piero Mckeon MD

## 2020-05-11 NOTE — PROGRESS NOTES
Code Minutes    Timed Code Treatment Minutes 50 Minutes 33 Minutes     Electronically signed by Rosemary Carnes on 5/11/20 at 2:42 PM EDT

## 2020-05-11 NOTE — PROGRESS NOTES
Orders:   · Feeding Route: Gastrostomy  · Formula: Semi-elemental  · Rate (ml/hr):90 mL per hr from 8 pm to 6 am; bolus 300 mL x 3 daily    · Volume (ml/day): 1800 mL/day  · Duration: Continuous, Bolus  · Water Flushes: 200 mL x 4 daily; 30 mL before and after each bolus feeding  · Current TF & Flush Orders Provides: 2160 kcal, 135 gm protein, 2440 mL free fluid  · Anthropometric Measures:  · Ht: 5' 10\" (177.8 cm)   · Current Body Wt: 320 lb (145.2 kg)  · Admission Body Wt: 320 lb (145.2 kg)  · Ideal Body Wt: 166 lb (75.3 kg), % Ideal Body 193%  · BMI Classification: BMI > or equal to 40.0 Obese Class III    Nutrition Interventions:   Continue NPO, Continue current Tube Feeding  Continued Inpatient Monitoring    Nutrition Evaluation:   · Evaluation: Progressing toward goals   · Goals: Enteral feeding will meet greater than 85% of estimated nutrition needs   · Monitoring: Weight, Pertinent Labs, Monitor Bowel Function, I&O, Skin Integrity, TF Tolerance, TF Intake, Chewing/Swallowing    Some areas of assessment may be incomplete due to standard COVID-19 Precautions. Sim Alcantar R.D., L.D.   Phone: [phone]

## 2020-05-11 NOTE — PROGRESS NOTES
[facility]    CONSULTATION / HISTORY AND PHYSICAL EXAMINATION            Date:   5/10/2020  Patient name:  Lisbeth Mcmullen  Date of admission:  5/3/2020 12:51 PM  MRN:   [id number]  Account:  [de-identified]  YOB: 1950  PCP:    Alton Lyon MD  Room:   9319/0743-99  Code Status:    Full Code    Physician Requesting Consult: Kim Max*    Reason for Consult:  Medical management     Chief Complaint:     No chief complaint on file. History Obtained From:     patient, electronic medical record    History of Present Illss:   Patient admitted to [facility] rehab center from [place], where he admitted with right-sided weakness, facial droop, aphasia. Patient underwent mechanical thrombectomy by neuro interventionist  Patient has history of dysphagia, PEG tube was placed  5/7   Patient had recent CT scan of her  No new changes  Tolerating tube feed  Past Medical History:     Past Medical History:   Diagnosis Date    Arterial ischemic stroke, MCA (middle cerebral artery), left, acute ([facility] 04/25/2020    Atrial fibrillation ([facility]     Stroke [facility])         Past Surgical History:     Past Surgical History:   Procedure Laterality Date    GASTROSTOMY TUBE PLACEMENT  05/01/2020    EGD    UPPER GASTROINTESTINAL ENDOSCOPY N/A 5/1/2020    ** CASE IN OR W/ GI STAFF - EGD ESOPHAGOGASTRODUODENOSCOPY PEG TUBE INSERTION performed by Abdiel Whiting DO at [facility]        Medications Prior to Admission:     Prior to Admission medications    Medication Sig Start Date End Date Taking?  Authorizing Provider   aspirin 81 MG chewable tablet 1 tablet by PEG Tube route daily 5/3/20   Amadeo Houser MD   atorvastatin (LIPITOR) 40 MG tablet 1 tablet by PEG Tube route nightly 5/3/20   Amadeo Houser MD   lisinopril (PRINIVIL;ZESTRIL) 10 MG tablet Take 1 tablet by mouth daily 5/3/20   Amadeo Hamlin MD Corrina        Allergies:     Patient has no known allergies. Social History:     Tobacco:    has no history on file for tobacco.  Alcohol:      has no history on file for alcohol. Drug Use:  has no history on file for drug. Family History:     No family history on file. Review of Systems:     Positive and Negative as described in HPI. CONSTITUTIONAL:  negative for fevers, chills, sweats, fatigue, weight loss  HEENT:  negative for vision, hearing changes, runny nose, throat pain  RESPIRATORY:  negative for shortness of breath, cough, congestion, wheezing. CARDIOVASCULAR:  negative for chest pain, palpitations. GASTROINTESTINAL:  negative for nausea, vomiting, diarrhea, constipation, change in bowel habits, abdominal pain   GENITOURINARY:  negative for difficulty of urination, burning with urination, frequency   INTEGUMENT:  negative for rash, skin lesions, easy bruising   HEMATOLOGIC/LYMPHATIC:  negative for swelling/edema   ALLERGIC/IMMUNOLOGIC:  negative for urticaria , itching  ENDOCRINE:  negative increase in drinking, increase in urination, hot or cold intolerance  MUSCULOSKELETAL:  negative joint pains, muscle aches, swelling of joints  NEUROLOGICAL: Right-sided weakness, aphasia   BEHAVIOR/PSYCH:  negative for depression, anxiety    Physical Exam:     BP (!) 111/53   Pulse 61   Temp 98.8 °F (37.1 °C) (Oral)   Resp 18   Ht 5' 10\" (1.778 m)   Wt (!) 320 lb (145.2 kg)   SpO2 96%   BMI 45.92 kg/m²   Temp (24hrs), Av.4 °F (36.9 °C), Min:97.9 °F (36.6 °C), Max:98.8 °F (37.1 °C)    Recent Labs     20  0734 05/09/20  2003 05/10/20  0809 05/10/20  2001   POCGLU 115* 117* 103 127*       Intake/Output Summary (Last 24 hours) at 5/10/2020 2325  Last data filed at 5/10/2020 2256  Gross per 24 hour   Intake 2741 ml   Output 825 ml   Net 1916 ml       General Appearance:  alert, well appearing, and in no acute distress, central obesity present.   Mental status: oriented to person, place,

## 2020-05-11 NOTE — PROGRESS NOTES
recognition program.  While intending to generate a document that actually reflects the content of the visit, the document can still have some errors including those of syntax and sound a like substitutions which may escape proof reading. In such instances, actual meaning can be extrapolated by contextual diversion.

## 2020-05-11 NOTE — PLAN OF CARE
Nutrition Problem: Swallowing difficulty  Intervention: Food and/or Nutrient Delivery: Continue NPO, Continue current Tube Feeding  Nutritional Goals: Enteral feeding will meet greater than 85% of estimated nutrition needs

## 2020-05-11 NOTE — PROGRESS NOTES
manuever w/c on level surfaces dist of 50 ft with L UE/L LE  Short term goal 5: Pt able to perform supine<>sit at mod A   Short term goal 6: Progress pt to pivot transfers on strong side with 2 person assist.  Long term goals  Time Frame for Long term goals : By DC  Long term goal 1: Pt able to perform bed mobility at min A   Long term goal 2: Pt able to perform sit to stand at min A    Long term goal 3: Pt able to perform transfers at min a (strong side)/mod a (weak side)  Long term goal 4: Pt able to  // bars for 5 to 10 minutes at min/mod A to perform pregait activiites  Long term goal 5: Progress pt to ambulation either in // bars or with appropriate assistive device dist of 20 to 30 ft, mod A, 2nd person CGA. Long term goal 6: Pt able to manuever w/c on level surface, dist of 150 ft, at supervsion level. Long term goal 7: Improve PASS score from 9/36 to 19/36 reduce fall risk. Patient Goals   Patient goals : Could not state    Plan    Plan  Times per week: 1.5 hr/day, 5 to 7 days/week. Current Treatment Recommendations: Strengthening, Functional Mobility Training, Transfer Training, Gait Training, Safety Education & Training, Balance Training, Endurance Training, Wheelchair Mobility Training, Patient/Caregiver Education & Training, Equipment Evaluation, Education, & procurement, Modalities  Safety Devices  Type of devices: All fall risk precautions in place, Gait belt, Patient at risk for falls, Left in bed, Call light within reach, Nurse notified  Restraints  Initially in place: No        05/11/20 1130 05/11/20 1516   PT Individual Minutes   Time In 1050 1435   Time Out 1115 1500   Minutes 25 25   Minute Variance   Variance  --  40   Reason  --  Pain  (LLE X-ray and Dopplers ordered.  Results pending.)     Ma Mad, PTA

## 2020-05-11 NOTE — PROGRESS NOTES
Speech Language Pathology  Speech Language Pathology  [facility]    Speech Language Treatment Note    Date: 5/11/2020  Patients Name: Ailin Shook  MRN: [id number]  Diagnosis:   Patient Active Problem List   Diagnosis Code    Cerebrovascular accident (CVA) ([facility] [address] I63.9    Acute ischemic left MCA stroke ([facility] [address] I63.512    Bradycardia R00.1    Aspiration pneumonia ([facility] [address] J69.0    Leukocytosis D72.829    Essential hypertension I10    Atrial fibrillation ([facility] [address] I48.91    Chronic acquired lymphedema I89.0    Oropharyngeal dysphagia R13.12    Acute CVA (cerebrovascular accident) ([facility] [address] I63.9       Pain: yes, pt pointing to left leg, unable to provided pain number due to aphasia/apraxia    Speech and Language Treatment  Treatment time:  3692-7812  Subjective: [x] Alert [x] Cooperative     [] Confused     [] Agitated      [] Lethargic    Objective/Assessment:  Auditory Comprehension:    Yes/no response: using gestures 90%  Auditory directives:  1 step: 80% ladonna  2 step 0%  Using bodypart ID commands    Verbal Expression:  N/a, pt perseverating on \"whoa\" throughout session, pt did successfully use gesture to report pain    Reading Comprehension: na, visual deficits      Speech:       Pt. Named objects around room c 0%, 40% c MAX cues. Use of carrier phrase did improve production but words are close approximations to target, even when repeating target word in isolation with max cues. Pt. Attempted to count from 1-10, Unable to state numbers 2 through 10, required cues to complete. Pt perseverating on \"one\" for each number. Pt. Has increased production when using finger to side of mouth, for tactile feedback. Pt. unable to state name p ST model with carrier phrase and gerda, \"My name is . .... Kirt Deng \". Pt repeating name with max cues (close approximation of target)    Apraxia application for iphone utilized during word repetition task in isolation.  Pt appears to benefit from visual cues provided by

## 2020-05-11 NOTE — PROGRESS NOTES
Speech Language Pathology  Speech Language Pathology  [facility]    Speech Language Treatment Note    Date: 5/11/2020  Patients Name: Lyndsey Eli  MRN: [id number]  Diagnosis:   Patient Active Problem List   Diagnosis Code    Cerebrovascular accident (CVA) ([facility] [address] I63.9    [facility] I63.512    Bradycardia R00.1    Aspiration pneumonia ([facility] [address] J69.0    Leukocytosis D72.829    Essential hypertension I10    Atrial fibrillation ([facility] [address] I48.91    Chronic acquired lymphedema I89.0    Oropharyngeal dysphagia R13.12    Acute CVA (ce[facility]brovascular accident) ([facility] I63.9       Pain: yes, 4/10 (using fingers to report pain level)    Speech and Language Treatment  Treatment time:  1862-9158  Subjective: [x] Alert [x] Cooperative     [] Confused     [] Agitated      [] Lethargic    Objective/Assessment:  Auditory Comprehension:    Yes/no response: using gestures 90%  Pt responding to yes/no appropriately when questioned by RN and physician during session. Verbal Expression:    Pt verbalized \"bye\" to OT. Pt also stated \"One boy\" when asked about children. Unable to state \"one girl\" with prompts. Reading Comprehension: na, visual deficits      Speech:   Stating biographical info: Own name: MAX cues  Son's name: MAX cues  Daughter's name: Max cues  *Pt's repetitions are close approximations of target word. Apraxia application for iphone utilized during word repetition task in isolation. ST unable to provided visual cues due to current mask protocol. Completed automatic speech task with max/fading cues provided. Pt counting to 3 with visual verbal cues, pt producing \"whoa\" for all subsequent numbers. Other:     Pt completed OMEs x 10 with max verbal cues provided. Pt unable to complete lingual lateralization exercises.      Plan:  [x] Continue ST services    [] Discharge from ST:      Discharge recommendations: [] Inpatient Rehab   [] [place]   [] Outpatient

## 2020-05-12 LAB
GLUCOSE BLD-MCNC: 125 MG/DL (ref 75–110)
GLUCOSE BLD-MCNC: 168 MG/DL (ref 75–110)

## 2020-05-12 PROCEDURE — 6370000000 HC RX 637 (ALT 250 FOR IP): Performed by: INTERNAL MEDICINE

## 2020-05-12 PROCEDURE — 97542 WHEELCHAIR MNGMENT TRAINING: CPT

## 2020-05-12 PROCEDURE — 97110 THERAPEUTIC EXERCISES: CPT

## 2020-05-12 PROCEDURE — 6370000000 HC RX 637 (ALT 250 FOR IP): Performed by: PHYSICAL MEDICINE & REHABILITATION

## 2020-05-12 PROCEDURE — 82947 ASSAY GLUCOSE BLOOD QUANT: CPT

## 2020-05-12 PROCEDURE — 6370000000 HC RX 637 (ALT 250 FOR IP): Performed by: STUDENT IN AN ORGANIZED HEALTH CARE EDUCATION/TRAINING PROGRAM

## 2020-05-12 PROCEDURE — 6370000000 HC RX 637 (ALT 250 FOR IP): Performed by: PSYCHIATRY & NEUROLOGY

## 2020-05-12 PROCEDURE — 92507 TX SP LANG VOICE COMM INDIV: CPT

## 2020-05-12 PROCEDURE — 97116 GAIT TRAINING THERAPY: CPT

## 2020-05-12 PROCEDURE — 97530 THERAPEUTIC ACTIVITIES: CPT

## 2020-05-12 PROCEDURE — 99231 SBSQ HOSP IP/OBS SF/LOW 25: CPT | Performed by: INTERNAL MEDICINE

## 2020-05-12 PROCEDURE — 2500000003 HC RX 250 WO HCPCS: Performed by: PHYSICAL MEDICINE & REHABILITATION

## 2020-05-12 PROCEDURE — 97535 SELF CARE MNGMENT TRAINING: CPT

## 2020-05-12 PROCEDURE — 1180000000 HC REHAB R&B

## 2020-05-12 PROCEDURE — 99232 SBSQ HOSP IP/OBS MODERATE 35: CPT | Performed by: PHYSICAL MEDICINE & REHABILITATION

## 2020-05-12 RX ORDER — ACETAMINOPHEN 500 MG
1000 TABLET ORAL EVERY 8 HOURS SCHEDULED
Status: DISCONTINUED | OUTPATIENT
Start: 2020-05-12 | End: 2020-05-23 | Stop reason: HOSPADM

## 2020-05-12 RX ADMIN — ANTI-FUNGAL POWDER MICONAZOLE NITRATE TALC FREE: 1.42 POWDER TOPICAL at 17:57

## 2020-05-12 RX ADMIN — SULFAMETHOXAZOLE AND TRIMETHOPRIM 1 TABLET: 800; 160 TABLET ORAL at 10:08

## 2020-05-12 RX ADMIN — ASPIRIN 81 MG 81 MG: 81 TABLET ORAL at 10:08

## 2020-05-12 RX ADMIN — ACETAMINOPHEN 1000 MG: 500 TABLET ORAL at 22:57

## 2020-05-12 RX ADMIN — APIXABAN 5 MG: 5 TABLET, FILM COATED ORAL at 10:08

## 2020-05-12 RX ADMIN — ACETAMINOPHEN 1000 MG: 500 TABLET ORAL at 12:26

## 2020-05-12 RX ADMIN — ANTI-FUNGAL POWDER MICONAZOLE NITRATE TALC FREE: 1.42 POWDER TOPICAL at 22:57

## 2020-05-12 RX ADMIN — ATORVASTATIN CALCIUM 40 MG: 40 TABLET, FILM COATED ORAL at 22:57

## 2020-05-12 RX ADMIN — LISINOPRIL 20 MG: 20 TABLET ORAL at 10:08

## 2020-05-12 RX ADMIN — TRAZODONE HYDROCHLORIDE 50 MG: 50 TABLET ORAL at 22:57

## 2020-05-12 RX ADMIN — ESOMEPRAZOLE MAGNESIUM 40 MG: 40 GRANULE, DELAYED RELEASE ORAL at 10:08

## 2020-05-12 RX ADMIN — SULFAMETHOXAZOLE AND TRIMETHOPRIM 1 TABLET: 800; 160 TABLET ORAL at 22:56

## 2020-05-12 RX ADMIN — APIXABAN 5 MG: 5 TABLET, FILM COATED ORAL at 22:57

## 2020-05-12 ASSESSMENT — PAIN DESCRIPTION - PAIN TYPE
TYPE: ACUTE PAIN
TYPE: ACUTE PAIN

## 2020-05-12 ASSESSMENT — PAIN DESCRIPTION - LOCATION
LOCATION: LEG
LOCATION: LEG

## 2020-05-12 ASSESSMENT — PAIN DESCRIPTION - ORIENTATION
ORIENTATION: LEFT
ORIENTATION: LEFT

## 2020-05-12 ASSESSMENT — PAIN SCALES - GENERAL
PAINLEVEL_OUTOF10: 4
PAINLEVEL_OUTOF10: 7
PAINLEVEL_OUTOF10: 4

## 2020-05-12 ASSESSMENT — PAIN DESCRIPTION - PROGRESSION: CLINICAL_PROGRESSION: GRADUALLY WORSENING

## 2020-05-12 NOTE — PROGRESS NOTES
History:     Tobacco:    has no history on file for tobacco.  Alcohol:      has no history on file for alcohol. Drug Use:  has no history on file for drug. Family History:     No family history on file. Review of Systems:     Positive and Negative as described in HPI. Constitutional: Negative for chills, fatigue, fever and unexpected weight change. HENT: Negative for ear discharge, facial swelling, nosebleeds, sore throat, trouble swallowing and voice change. Eyes: Negative for redness and visual disturbance. Respiratory: Negative for cough, shortness of breath and wheezing. Cardiovascular: Negative for chest pain, palpitations and leg swelling. Gastrointestinal: Negative for abdominal pain, blood in stool, diarrhea and vomiting. Genitourinary: Negative for, dysuria and flank pain. Chronic urinary incontinence  Musculoskeletal: Negative for joint swelling. Skin: Left leg pain, increased redness left lower leg in addition to bilateral stasis dermatitis. Neurological: Right-sided weakness  Hematological: Negative for adenopathy. Does not bruise/bleed easily. Physical Exam:     /63   Pulse 69   Temp 98.2 °F (36.8 °C) (Oral)   Resp 18   Ht 5' 10\" (1.778 m)   Wt (!) 320 lb (145.2 kg)   SpO2 98%   BMI 45.92 kg/m²   Temp (24hrs), Av.1 °F (36.7 °C), Min:97.9 °F (36.6 °C), Max:98.2 °F (36.8 °C)      General appearance:  alert, cooperative and no distress, obese, slurred speech  Eyes: Anicteric sclera. Pupils are equally round and reactive to light. Extraocular movements are intact.   Lungs:  clear to auscultation bilaterally, normal effort  Heart:  regular rate and rhythm, no murmur  Abdomen:  soft, nontender, nondistended, normal bowel sounds, no masses, hepatomegaly, splenomegaly, PEG tube in situ  Extremities:  no edema, redness, tenderness in the calves  Skin:  no gross lesions, rashes, induration  Neuro:  Alert, oriented X 3, Broca's aphasia, right

## 2020-05-12 NOTE — PLAN OF CARE
Problem: Falls - Risk of:  Goal: Will remain free from falls  Description: Will remain free from falls  Outcome: Met This Shift  Note: No falls noted this shift. Bed kept in low position. Safe environment maintained. Bedside table & call light in reach. Uses call light appropriately when needing assistance. Goal: Absence of physical injury  Description: Absence of physical injury  Outcome: Met This Shift     Problem: Skin Integrity:  Goal: Absence of new skin breakdown  Description: Absence of new skin breakdown  Outcome: Met This Shift     Problem: Nutrition  Goal: Optimal nutrition therapy  5/12/2020 0452 by Kristina Russell RN  Outcome: Met This Shift  Note: NPO. Vital AF 1.2 infusing at 90 ml/hr through g-tube from 2119-7920 with 200 ml water bolus before starting and after infusion complete. No residual noted. 5/11/2020 1725 by Oscar Dangelo RD, LD  Outcome: Ongoing     Problem: Activity:  Goal: Ability to tolerate increased activity will improve  Description: Ability to tolerate increased activity will improve  Outcome: Ongoing     Problem: Skin Integrity:  Goal: Will show no infection signs and symptoms  Description: Will show no infection signs and symptoms  Outcome: Ongoing  Note: Patient taking Bactrim for cellulitis to LLE. Afebrile w/VSS. Problem: Pain:  Goal: Pain level will decrease  Description: Pain level will decrease  Outcome: Ongoing  Note: Patient points to pain level on paper stating \"mild\" pain to LLE.    Goal: Control of acute pain  Description: Control of acute pain  Outcome: Ongoing

## 2020-05-12 NOTE — PROGRESS NOTES
Physical Medicine & Rehabilitation  Progress Note      Subjective:      78year-old gentleman with R nondominant hemiparesis, aphasia, and dysphagia secondary to ischemic CVA. Patient is having problems with pain in the L leg, requiring pain medications, somewhat improved since yesterday. ROS:  Denies fevers, chills, sweats. No chest pain, palpitations, lightheadedness. Denies coughing, wheezing or shortness of breath. Denies abdominal pain, nausea, diarrhea or constipation. No new areas of joint pain. Denies new areas of numbness or weakness. Denies new anxiety or depression issues. No new skin problems. Rehabilitation:   Progressing in therapies. PT:  Restrictions/Precautions: Fall Risk, Up as Tolerated(PEG tube)  Required Braces or Orthoses  Other: Abdominal Binder   Transfers  Sit to Stand: Moderate Assistance(with umang stedy 2 *to pain )  Stand to sit: Moderate Assistance  Bed to Chair: Dependent/Total, 2 Person Assistance(with umang stedy 2* to pain )  Stand Pivot Transfers: Moderate Assistance, Maximum Assistance(x1 assist; increase assistance required with fatigue)  Squat Pivot Transfers: Maximum Assistance(to L)  Comment: Patient requiring increased assist this AM, use of umang stedy vs stand pivot for safety 2* increased pain. Patient difficulty WBing on LLE this date.    Ambulation 1  Surface: level tile  Device: (walker Lift)  Other Apparatus: Wheelchair follow  Assistance: Maximum assistance, Moderate assistance, 2 Person assistance(Max Ax1 on right Side, Mod Ax1 on Left Side)  Quality of Gait: tactile and vc's for weight shifting and advancing right LE while maintaining extension of right knee during stance phase, assist with right LE during swing phase at times, fatigues quickly with 1 standing Rest break  Gait Deviations: Slow Catherine, Decreased step length, Decreased step height, Increased JONH, Deviated path  Distance: 27ft  Comments: No LOB, heavy pusher to right side needing more Max Ax1 on right side, vc's for sequencing    Transfers  Sit to Stand: Moderate Assistance(with umang stedy 2 *to pain )  Stand to sit: Moderate Assistance  Bed to Chair: Dependent/Total, 2 Person Assistance(with umang stedy 2* to pain )  Stand Pivot Transfers: Moderate Assistance, Maximum Assistance(x1 assist; increase assistance required with fatigue)  Squat Pivot Transfers: Maximum Assistance(to L)  Comment: Patient requiring increased assist this AM, use of umang stedy vs stand pivot for safety 2* increased pain. Patient difficulty WBing on LLE this date.    Ambulation  Ambulation?: No  Ambulation 1  Surface: level tile  Device: (walker Lift)  Other Apparatus: Wheelchair follow  Assistance: Maximum assistance, Moderate assistance, 2 Person assistance(Max Ax1 on right Side, Mod Ax1 on Left Side)  Quality of Gait: tactile and vc's for weight shifting and advancing right LE while maintaining extension of right knee during stance phase, assist with right LE during swing phase at times, fatigues quickly with 1 standing Rest break  Gait Deviations: Slow Catherine, Decreased step length, Decreased step height, Increased JONH, Deviated path  Distance: 27ft  Comments: No LOB, heavy pusher to right side needing more Max Ax1 on right side, vc's for sequencing    Surface: level tile  Ambulation 1  Surface: level tile  Device: (walker Lift)  Other Apparatus: Wheelchair follow  Assistance: Maximum assistance, Moderate assistance, 2 Person assistance(Max Ax1 on right Side, Mod Ax1 on Left Side)  Quality of Gait: tactile and vc's for weight shifting and advancing right LE while maintaining extension of right knee during stance phase, assist with right LE during swing phase at times, fatigues quickly with 1 standing Rest break  Gait Deviations: Slow Catherine, Decreased step length, Decreased step height, Increased JONH, Deviated path  Distance: 27ft  Comments: No LOB, heavy pusher to right side needing more Max Ax1 on right side, vc's for sequencing    OT:  ADL  Equipment Provided: Dressing stick, Long-handled sponge, Sock aid(Soft sock-aid 2* size of feet/condition of skin)  Feeding: NPO(PEG tube)  Grooming: Stand by assistance(Washed face/hands with set-up/SBA )  UE Bathing: Minimal assistance(A for LUE and support to RUE while pt washes it)  LE Bathing: Dependent/Total(Limited ADL done in supine today.)  UE Dressing: (Min A for hospital gown; No clothing available)  LE Dressing: None(Pt seen bedside, LBD not addressed this date)  Toileting: Dependent/Total(Incontinent of BM, TA for clean-up; SBA for use of HH urinal)  Additional Comments: Limited ADL completed bedside 2* pt with new onset LLE pain at 7/10 in AM and x-ray/dopplers ordered in PM.         Balance  Sitting Balance: Stand by assistance(Static sitting EOB)  Standing Balance: Minimal assistance(standing in SS )   Standing Balance  Time: 2 minute intervals, x 5  Activity: standing while RN changed wound dressing and completed perineal hygiene  Comment: writer provided support to keep RUE in place on SS bar  Functional Mobility  Functional - Mobility Device: Wheelchair  Activity: To/from bathroom  Assist Level: Dependent/Total     Bed mobility  Bridging: Moderate assistance(right buttocks clears bed, left buttocks doesn't)  Rolling to Left: Minimal assistance  Rolling to Right: Minimal assistance  Supine to Sit: Moderate assistance  Sit to Supine: Moderate assistance  Scooting: Moderate assistance  Comment: Increased assist this date 2* to pain   Transfers  Sit to stand: Moderate assistance(umang steady)  Stand to sit: Moderate assistance( )  Transfer Comments: Standing/transfers deferred this date 2* new onset LLE pain in AM and x-ray/dopplers ordered in PM.            Wheelchair Bed Transfers  Equipment Used: Bed, Wheelchair  Level of Asssistance: Dependent/Total  Wheelchair Transfers Comments: Ruth Vigil    SPEECH:  Subjective: [x]? Alert     [x]? Cooperative     []? Confused     []? interactive. Affect WNL.     Diagnostics:     ONE XRAY VIEW OF THE LEFT HIP       5/11/2020 12:43 pm       COMPARISON:   None.       HISTORY:   ORDERING SYSTEM PROVIDED HISTORY: Sharp pain L leg, worse with weight bearing   TECHNOLOGIST PROVIDED HISTORY:   Sharp pain L leg, worse with weight bearing   Reason for Exam: Sharp pain L leg, worse with weight bearing   Acuity: Acute   Type of Exam: Initial       FINDINGS:   One view of the left hip reveals no definite evidence of acute fracture or   dislocation. March Bame is suggestion of loss of joint space at the left   acetabulum.           Impression   No definite acute fracture of the left hip.  Possible joint space loss the   left acetabulum. LLE Venous Doppler 5/11/2020:  Type of Study:        Veins: Lower Extremities DVT Study, Venous Scan Lower Left.       Indications for Study:Pain, leg.       Patient Status: In Patient. Technical Quality:Limited visualization.        Conclusions        Summary        Technically limited visualization. No evidence of superficial or deep    venous thrombosis in the left lower extremity. CBC:   Recent Labs     05/11/20  1043   WBC 11.2*   RBC 4.61   HGB 13.3*   HCT 40.2*   MCV 87.2   RDW 15.0*        BMP:   Recent Labs     05/11/20  1043      K 4.6      CO2 25   BUN 36*   CREATININE 1.00   GLUCOSE 170*     BNP: No results for input(s): BNP in the last 72 hours. PT/INR: No results for input(s): PROTIME, INR in the last 72 hours. APTT: No results for input(s): APTT in the last 72 hours. CARDIAC ENZYMES: No results for input(s): CKMB, CKMBINDEX, TROPONINT in the last 72 hours. Invalid input(s): CKTOTAL;3  FASTING LIPID PANEL:  Lab Results   Component Value Date    CHOL 111 04/25/2020    HDL 46 04/25/2020    TRIG 80 04/25/2020     LIVER PROFILE: No results for input(s): AST, ALT, ALB, BILIDIR, BILITOT, ALKPHOS in the last 72 hours.      Current Medications:   Current Facility-Administered Medications:

## 2020-05-12 NOTE — PROGRESS NOTES
Speech Language Pathology  Speech Language Pathology  [facility]    Speech Language Treatment Note    Date: 5/12/2020  Patients Name: Yana Danny  MRN: [id number]  Diagnosis:   Patient Active Problem List   Diagnosis Code    Cerebrovascular accident (CVA) [facility]) I63.9    Acute ischemic left MCA stroke ([facility] [address] I63.512    Bradycardia R00.1    Aspiration pneumonia ([facility] J69.0    Leukocytosis D72.829    Essential hypertension I10    [facility] I48.91    Chronic acquired lymphedema I89.0    Oropharyngeal dysphagia R13.12    Acute CVA (cerebrovascular accident) ([facility] I63.9       Pain: 4/10  (R leg)    Speech and Language Treatment  Treatment time:  3183-3566    Subjective: [x] Alert [x] Cooperative     [] Confused     [] Agitated      [] Lethargic    Objective/Assessment:  Auditory Comprehension:  Pt. Able to follow general directions and answer y/n questions asked. Verbal Expression:  n/a d/t severe apraxia    Reading Comprehension:  N/a    Speech:      Confrontation naming- 20%, 80% c sentence completion cues, 90% when repeating. Pt. Approximations getting closer to target than previous encounters. Pt. Able to count 1-10 c 20% accuracy, 90% c cues. Pt. Responds very well to sentence completion/phonemic cues. Pt. Named body parts c 0%, 20% c visual cue. Pt. Used cues in room to state his daughter's name. Pt. Needed max written, verbal and visual cues to state his own name. Other:       Plan:  [x] Continue ST services    [] Discharge from ST:      Discharge recommendations: [] Inpatient Rehab   [] [place]   [] Outpatient Therapy  [] Follow up at trauma clinic   [] Other:       Treatment completed by: Emily Rich A.CCC/SLP

## 2020-05-12 NOTE — PROGRESS NOTES
)  Other exercises 9: UE proprioception (B self-assisted hand cleasing with )    Activity Tolerance: Patient limited by endurance, Patient Tolerated treatment well, Patient limited by pain    Current Treatment Recommendations: Strengthening, Functional Mobility Training, Transfer Training, Gait Training, Safety Education & Training, Balance Training, Endurance Training, Wheelchair Mobility Training, Patient/Caregiver Education & Training, Equipment Evaluation, Education, & procurement, Modalities    Conditions Requiring Skilled Therapeutic Intervention  Body structures, Functions, Activity limitations: Decreased functional mobility ; Decreased strength;Decreased endurance;Decreased sensation;Decreased balance  Barriers to Learning: Aphasic  REQUIRES PT FOLLOW UP: Yes  Discharge Recommendations: Patient would benefit from continued therapy after discharge;24 hour supervision or assist;Home with Home health PT    Goals  Short term goals  Time Frame for Short term goals: 10 days  Short term goal 1: Pt to perform bed mobility/rolling to left at mod A.   Short term goal 2: Improve sitting dynamic balance to fair+  Short term goal 3: Pt to perform sit to stand at min/mod A from w/c/bed  Short term goal 4: Pt able to manuever w/c on level surfaces dist of 50 ft with L UE/L LE  Short term goal 5: Pt able to perform supine<>sit at mod A   Short term goal 6: Progress pt to pivot transfers on strong side with 2 person assist.  Long term goals  Time Frame for Long term goals : By DC  Long term goal 1: Pt able to perform bed mobility at min A   Long term goal 2: Pt able to perform sit to stand at min A    Long term goal 3: Pt able to perform transfers at min a (strong side)/mod a (weak side)  Long term goal 4: Pt able to  // bars for 5 to 10 minutes at min/mod A to perform pregait activiites  Long term goal 5: Progress pt to ambulation either in // bars or with appropriate assistive device dist of 20 to 30 ft, mod A, 2nd person CGA. Long term goal 6: Pt able to manuever w/c on level surface, dist of 150 ft, at supervsion level. Long term goal 7: Improve PASS score from 9/36 to 19/36 reduce fall risk.       05/12/20 1221 05/12/20 1414   PT Individual Minutes   Time In 1030 1413   Time Out 1130 1452   Minutes 60 39     Electronically signed by Yecenia Hurley PTA on 5/12/20 at 5:50 PM EDT

## 2020-05-12 NOTE — PROGRESS NOTES
able to thread LUE into sleeve but required A for RUE. OT supported RUE while pt threaded sleeve over hand. OT assisted with threading sleeve over elbow. Pt then placed shirt OH and was able to pull down around trunk on L side. OT assisted with pulling shirt down on R side. Pt required TA to thread RLE into pants. Pt threaded LLE into pants with verbal cues for technique, reacher, and Min A. Pt able to pull up L side of pants while standing in SS.  OT assisted with R side of pants. In PM session pt sat EOB to address bathing of lower legs/feet and changing socks. Pt used LH sponge with SBA and was able to wash majority of L lower leg/foot and medial aspect of R lower leg/foot. OT assisted with other areas. Pt able to doff socks with dressing stick and Min A on R side. Pt donned socks with sock-aid and Max A for both. Pt continues to have limited success with sock-aid 2* skin condition, size of feet, and completing task one-handed. Assessment  Performance deficits / Impairments: Decreased functional mobility ; Decreased ADL status; Decreased ROM; Decreased strength;Decreased endurance;Decreased sensation;Decreased balance;Decreased fine motor control;Decreased coordination;Decreased posture  Prognosis: Good  Discharge Recommendations: 24 hour supervision or assist  Activity Tolerance: Patient Tolerated treatment well;Patient limited by pain  Activity Tolerance: Pt able to participate in full sessions but unable to tolerate staying up in wheelchair at end of session 2* pain in LLE. Safety Devices in place: Yes  Type of devices: Patient at risk for falls;Gait belt;Left in bed;Call light within reach    Patient Education:  Patient Goals   Patient goals : Pt with expressive aphasia - unable to state goals, but agreeable to OT goals of increasing IND and addressing function of RUE. Demo'd agreement with a thumbs up.    ADL/AE training, elba-techniques, encouraged pt to attempt verbalization when communicating  Learner:patient  Method: demonstration and explanation       Outcome: demonstrated understanding and needs reinforcement     Plan  Plan  Times per week: 5-7  Times per day: Twice a day  Current Treatment Recommendations: Functional Mobility Training, Endurance Training, Safety Education & Training, Equipment Evaluation, Education, & procurement, Patient/Caregiver Education & Training, Self-Care / ADL, Strengthening, ROM, Balance Training, Neuromuscular Re-education, Positioning  Patient Goals   Patient goals : Pt with expressive aphasia - unable to state goals, but agreeable to OT goals of increasing IND and addressing function of RUE. Demo'd agreement with a thumbs up. Short term goals  Time Frame for Short term goals: One Week  Short term goal 1: Pt will complete eating (if swallowing improves for PO intake)/grooming with Min A using modified techniques as needed. Short term goal 2: Pt will complete UB bathing with Min A and LB bathing with Mod A and Good safety using AE/modified techniques as needed. Short term goal 3: Pt will complete UB dressing with Mod A and LB dressing with Max A using AE/modified techniques as needed. Short term goal 4: Pt will complete toilet transfer with Max A x1 and Good safety using appropriate DME. Short term goal 5: Pt will stand for 2-3 minutes with 1-2 UE support and Min A while participating in a functional task. Short term goal 6: Pt will actively participate in 30 minutes of therapeutic exercise/activity with focus on increasing function or RUE to promote increased IND and safety with self-care and mobility. Long term goals  Time Frame for Long term goals : By Discharge  Long term goal 1: Pt will complete eating/grooming/UB ADL's with set-up A and Good safety using AE/modified techniques as needed. Long term goal 2: Pt will complete LB ADL's/toileting with Min A and Good safety using AE/modified techniques as needed.   Long term goal 3: Pt will complete functional transfers with Min-Mod A and Good safety using appropriate DME. Long term goal 4: Pt will demo IND with appropriate HEP for RUE ROM/strength/coordination.      05/12/20 0805 05/12/20 1234   OT Individual Minutes   Time In [facility]. 41   Time Out 3753 [id number]   Minutes 67 32   Time Code Minutes    Timed Code Treatment Minutes 67 Minutes 32 Minutes     Electronically signed by Saul Vasquez on 5/12/20 at 2:38 PM EDT

## 2020-05-12 NOTE — CARE COORDINATION
Writer discussed d/c plan with the pt son,sherley. he voiced concern about the pt going back to his sister home,she is disabled and couldn't take care of the pt. Plus she lives in [address] lives in [facility]. discussed several options. The final choice sherley made is have the [facility] referred to [facility] [place] that was close to where he lives.  Writer will discuss with dr Brooke Goyal before he makes referral.

## 2020-05-12 NOTE — PLAN OF CARE
Problem: Falls - Risk of:  Goal: Will remain free from falls  Description: Will remain free from falls  5/12/2020 1304 by Vaishali Aguilar RN  Outcome: Ongoing  5/12/2020 0452 by Merly Joaquin RN  Outcome: Met This Shift  Note: No falls noted this shift. Bed kept in low position. Safe environment maintained. Bedside table & call light in reach. Uses call light appropriately when needing assistance. Goal: Absence of physical injury  Description: Absence of physical injury  5/12/2020 1304 by Vaishali Aguilar RN  Outcome: Ongoing  5/12/2020 0452 by Merly Joaquin RN  Outcome: Met This Shift     Problem: Activity:  Goal: Ability to tolerate increased activity will improve  Description: Ability to tolerate increased activity will improve  5/12/2020 1304 by Vaishali Aguilar RN  Outcome: Ongoing  5/12/2020 0452 by Merly Joaquin RN  Outcome: Ongoing     Problem: Skin Integrity:  Goal: Will show no infection signs and symptoms  Description: Will show no infection signs and symptoms  5/12/2020 1304 by Vaishali Aguilar RN  Outcome: Ongoing  5/12/2020 0452 by Merly Joaquin RN  Outcome: Ongoing  Note: Patient taking Bactrim for cellulitis to LLE. Afebrile w/VSS. Goal: Absence of new skin breakdown  Description: Absence of new skin breakdown  5/12/2020 1304 by Vaishali Aguilar RN  Outcome: Ongoing  5/12/2020 0452 by Merly Joaquin RN  Outcome: Met This Shift     Problem: Nutrition  Goal: Optimal nutrition therapy  5/12/2020 1304 by Vaishali Aguilar RN  Outcome: Ongoing  5/12/2020 0452 by Merly Joaquin RN  Outcome: Met This Shift  Note: NPO. Vital AF 1.2 infusing at 90 ml/hr through g-tube from 3568-0573 with 200 ml water bolus before starting and after infusion complete. No residual noted.       Problem: Pain:  Goal: Pain level will decrease  Description: Pain level will decrease  5/12/2020 1304 by Vaishali Aguilar RN  Outcome: Ongoing  5/12/2020 0452 by Merly Joaquin RN  Outcome: Ongoing  Note:

## 2020-05-13 LAB
GLUCOSE BLD-MCNC: 119 MG/DL (ref 75–110)
GLUCOSE BLD-MCNC: 121 MG/DL (ref 75–110)

## 2020-05-13 PROCEDURE — 6370000000 HC RX 637 (ALT 250 FOR IP): Performed by: PSYCHIATRY & NEUROLOGY

## 2020-05-13 PROCEDURE — 99231 SBSQ HOSP IP/OBS SF/LOW 25: CPT | Performed by: INTERNAL MEDICINE

## 2020-05-13 PROCEDURE — 92507 TX SP LANG VOICE COMM INDIV: CPT

## 2020-05-13 PROCEDURE — 97110 THERAPEUTIC EXERCISES: CPT

## 2020-05-13 PROCEDURE — 6370000000 HC RX 637 (ALT 250 FOR IP): Performed by: PHYSICAL MEDICINE & REHABILITATION

## 2020-05-13 PROCEDURE — 6370000000 HC RX 637 (ALT 250 FOR IP): Performed by: INTERNAL MEDICINE

## 2020-05-13 PROCEDURE — 6370000000 HC RX 637 (ALT 250 FOR IP): Performed by: STUDENT IN AN ORGANIZED HEALTH CARE EDUCATION/TRAINING PROGRAM

## 2020-05-13 PROCEDURE — 1180000000 HC REHAB R&B

## 2020-05-13 PROCEDURE — 82947 ASSAY GLUCOSE BLOOD QUANT: CPT

## 2020-05-13 PROCEDURE — 99232 SBSQ HOSP IP/OBS MODERATE 35: CPT | Performed by: PHYSICAL MEDICINE & REHABILITATION

## 2020-05-13 PROCEDURE — 97112 NEUROMUSCULAR REEDUCATION: CPT

## 2020-05-13 PROCEDURE — 97116 GAIT TRAINING THERAPY: CPT

## 2020-05-13 PROCEDURE — 97535 SELF CARE MNGMENT TRAINING: CPT

## 2020-05-13 PROCEDURE — 97530 THERAPEUTIC ACTIVITIES: CPT

## 2020-05-13 PROCEDURE — 97542 WHEELCHAIR MNGMENT TRAINING: CPT

## 2020-05-13 PROCEDURE — 99212 OFFICE O/P EST SF 10 MIN: CPT

## 2020-05-13 RX ADMIN — APIXABAN 5 MG: 5 TABLET, FILM COATED ORAL at 21:23

## 2020-05-13 RX ADMIN — ACETAMINOPHEN 1000 MG: 500 TABLET ORAL at 21:22

## 2020-05-13 RX ADMIN — ESOMEPRAZOLE MAGNESIUM 40 MG: 40 GRANULE, DELAYED RELEASE ORAL at 10:14

## 2020-05-13 RX ADMIN — SULFAMETHOXAZOLE AND TRIMETHOPRIM 1 TABLET: 800; 160 TABLET ORAL at 09:21

## 2020-05-13 RX ADMIN — SULFAMETHOXAZOLE AND TRIMETHOPRIM 1 TABLET: 800; 160 TABLET ORAL at 21:23

## 2020-05-13 RX ADMIN — ACETAMINOPHEN 1000 MG: 500 TABLET ORAL at 05:56

## 2020-05-13 RX ADMIN — ACETAMINOPHEN 1000 MG: 500 TABLET ORAL at 13:29

## 2020-05-13 RX ADMIN — ATORVASTATIN CALCIUM 40 MG: 40 TABLET, FILM COATED ORAL at 21:22

## 2020-05-13 RX ADMIN — APIXABAN 5 MG: 5 TABLET, FILM COATED ORAL at 09:21

## 2020-05-13 RX ADMIN — ASPIRIN 81 MG 81 MG: 81 TABLET ORAL at 09:21

## 2020-05-13 RX ADMIN — LISINOPRIL 20 MG: 20 TABLET ORAL at 09:21

## 2020-05-13 RX ADMIN — ANTI-FUNGAL POWDER MICONAZOLE NITRATE TALC FREE: 1.42 POWDER TOPICAL at 21:23

## 2020-05-13 RX ADMIN — ANTI-FUNGAL POWDER MICONAZOLE NITRATE TALC FREE: 1.42 POWDER TOPICAL at 10:14

## 2020-05-13 RX ADMIN — TRAZODONE HYDROCHLORIDE 50 MG: 50 TABLET ORAL at 21:23

## 2020-05-13 ASSESSMENT — PAIN DESCRIPTION - PAIN TYPE
TYPE: ACUTE PAIN

## 2020-05-13 ASSESSMENT — PAIN SCALES - GENERAL
PAINLEVEL_OUTOF10: 0
PAINLEVEL_OUTOF10: 0
PAINLEVEL_OUTOF10: 2

## 2020-05-13 ASSESSMENT — PAIN DESCRIPTION - LOCATION
LOCATION: LEG

## 2020-05-13 ASSESSMENT — PAIN DESCRIPTION - DESCRIPTORS
DESCRIPTORS: PATIENT UNABLE TO DESCRIBE
DESCRIPTORS: PATIENT UNABLE TO DESCRIBE

## 2020-05-13 ASSESSMENT — PAIN DESCRIPTION - ORIENTATION
ORIENTATION: LEFT
ORIENTATION: LEFT

## 2020-05-13 ASSESSMENT — PAIN DESCRIPTION - PROGRESSION: CLINICAL_PROGRESSION: GRADUALLY WORSENING

## 2020-05-13 ASSESSMENT — PAIN SCALES - WONG BAKER: WONGBAKER_NUMERICALRESPONSE: 4

## 2020-05-13 NOTE — PROGRESS NOTES
Physical Medicine & Rehabilitation  Progress Note      Subjective:      78year-old gentleman with R nondominant hemiparesis, aphasia, and dysphagia secondary to ischemic CVA. Patient is well, but has had some minor complaints of pain in the L leg , requiring pain medications which is improved with routine Tylenol. He had one episode of emesis after bolus tube feed on 5/12. He reports some mild impairment with sleep which may be environmental and due to interruptions. ROS:  Denies fevers, chills, sweats. No chest pain, palpitations, lightheadedness. Denies coughing, wheezing or shortness of breath. Denies abdominal pain, nausea, diarrhea or constipation. No new areas of joint pain. Denies new areas of numbness or weakness. Denies new anxiety or depression issues. No new skin problems. Rehabilitation:   Progressing in therapies. PT:  Restrictions/Precautions: Fall Risk, Up as Tolerated(PEG tube)  Required Braces or Orthoses  Other: Abdominal Binder   Transfers  Sit to Stand: Minimal Assistance, 2 Person Assistance(Walker lift; PT Skye Arciniega reports pt req'd less A than previous)  Stand to sit: Minimal Assistance(Min Ax2 walker lift; pt lyubov's good eccentric control)  Bed to Chair: Dependent/Total, 2 Person Assistance(Standing with [place])  Stand Pivot Transfers: Moderate Assistance, Maximum Assistance(x1 assist; increase assistance required with fatigue)  Squat Pivot Transfers: Maximum Assistance(to L)  Comment: Patient requiring increased assist this AM, use of umang stedy vs stand pivot for safety 2* increased pain. Patient difficulty WBing on LLE this date.    Ambulation 1  Surface: level tile  Device: (walker Lift)  Other Apparatus: Wheelchair follow  Assistance: 2 Person assistance, Minimal assistance  Quality of Gait: tactile and vc's for weight shifting and advancing right LE while maintaining extension of right knee during stance phase, assist with right LE during swing phase at times, fatigued quickly  Gait Deviations: Slow Catherine, Decreased step length, Decreased step height, Increased JONH(rear wheels on walker lift locked to maintain straight path)  Distance: 30'  Comments: No pushing noted today, no LOB. VCs for R swing/TKE. Transfers  Sit to Stand: Minimal Assistance, 2 Person Assistance(Walker lift; PT Lalo Mann reports pt req'd less A than previous)  Stand to sit: Minimal Assistance(Min Ax2 walker lift; pt demo's good eccentric control)  Bed to Chair: Dependent/Total, 2 Person Assistance(Standing with Diogo Cleve)  Stand Pivot Transfers: Moderate Assistance, Maximum Assistance(x1 assist; increase assistance required with fatigue)  Squat Pivot Transfers: Maximum Assistance(to L)  Comment: Patient requiring increased assist this AM, use of umang stedy vs stand pivot for safety 2* increased pain. Patient difficulty WBing on LLE this date. Ambulation  Ambulation?: Yes  Ambulation 1  Surface: level tile  Device: (walker Lift)  Other Apparatus: Wheelchair follow  Assistance: 2 Person assistance, Minimal assistance  Quality of Gait: tactile and vc's for weight shifting and advancing right LE while maintaining extension of right knee during stance phase, assist with right LE during swing phase at times, fatigued quickly  Gait Deviations: Slow Catherine, Decreased step length, Decreased step height, Increased JONH(rear wheels on walker lift locked to maintain straight path)  Distance: 30'  Comments: No pushing noted today, no LOB. VCs for R swing/TKE.     Surface: level tile  Ambulation 1  Surface: level tile  Device: (walker Lift)  Other Apparatus: Wheelchair follow  Assistance: 2 Person assistance, Minimal assistance  Quality of Gait: tactile and vc's for weight shifting and advancing right LE while maintaining extension of right knee during stance phase, assist with right LE during swing phase at times, fatigued quickly  Gait Deviations: Slow Catherine, Decreased step length, Decreased step height, Increased Pt continues to have limited success with sock-aid 2* skin condition, size of feet, and completing task one-handed. Balance  Sitting Balance: Stand by assistance(Occasional CGA when completing dynamic reaching tasks)  Standing Balance: Minimal assistance(Varied CGA/Min A standing in SS)   Standing Balance  Time: Several 1-2 minute intervals  Activity: Standing in SS during ADL's and toileting  Comment: A to maintain RUE on [employer]  Functional Mobility  Functional - Mobility Device: Wheelchair  Activity: To/from bathroom  Assist Level: Dependent/Total     Bed mobility  Bridging: Moderate assistance(right buttocks clears bed, left buttocks doesn't)  Rolling to Left: Minimal assistance  Rolling to Right: Minimal assistance  Supine to Sit: Moderate assistance  Sit to Supine: Moderate assistance  Scooting: Moderate assistance(hips to EOB)  Comment: Increased assist this date 2* to pain   Transfers  Sit to stand: Moderate assistance  Stand to sit: Moderate assistance  Transfer Comments: A provided on R side; [employer] used for all transfers this date   Toilet Transfers  Equipment Used: Raised toilet seat with rails  Toilet Transfer: Dependent/Total  Toilet Transfers Comments: [employer]        Wheelchair [employer] Used: [employer], Wheelchair  Level of Asssistance: Dependent/Total  Wheelchair Transfers Comments: [employer]    SPEECH:  Subjective: [x]? Alert     [x]? Cooperative     []? Confused     []? Agitated      []? Lethargic     Objective/Assessment:  Auditory Comprehension:  Pt. Able to follow general directions and answer y/n questions asked.      Verbal Expression:  n/a d/t severe apraxia     Reading Comprehension:  N/a     Speech:       Confrontation naming- 20%, 80% c sentence completion cues, 90% when repeating. Pt. Approximations getting closer to target than previous encounters. Pt. Able to count 1-10 c 20% accuracy, 90% c cues.   Pt. Responds very well to sentence completion/phonemic cues.   Pt. Named body parts c 0%, 20% c visual cue. Pt. Used cues in room to state his daughter's name. Pt. Needed max written, verbal and visual cues to state his own name. Objective:  /61   Pulse 57   Temp 97.3 °F (36.3 °C) (Oral)   Resp 16   Ht 5' 10\" (1.778 m)   Wt (!) 320 lb (145.2 kg)   SpO2 97%   BMI 45.92 kg/m²       GEN: Well developed, well nourished, in NAD  HEENT:  NCAT.  PERRL.  EOMI.  Mucous membranes pink and moist.   PULM:  Clear to ausculation. No rales or rhonchi. Respirations WNL and unlabored. CV:  Bradycardic rate regular rhythm.  No murmurs or gallops. GI:  Abdomen soft. Obese, Nontender. Non-distended.  BS + and equal.    NEUROLOGICAL: Alert and interactive. Following commands. Impaired speech with persistent broken words. Sensation intact to light touch.   MSK:  Functional ROM LUE and LLE, Flaccid RUE and RLE. Motor testing 4+/5 key muscles LUE and 4/5 key muscles LLE. R shoulder 1/5, R arm/hand 0/5. R hip flexor muscles 2/5.   SKIN: Warm dry and intact. Good turgor except distal BLE with chronic skin changes and dryness/hyperkeratosis.   EXTREMITIES:  No calf tenderness to palpation bilaterally. Adam's negative. Chronic stable edema distal BLEs. .    PSYCH: Mood WNL. Appropriately interactive. Affect WNL.     Diagnostics:     CBC:   Recent Labs     05/11/20  1043   WBC 11.2*   RBC 4.61   HGB 13.3*   HCT 40.2*   MCV 87.2   RDW 15.0*        BMP:   Recent Labs     05/11/20  1043      K 4.6      CO2 25   BUN 36*   CREATININE 1.00   GLUCOSE 170*     BNP: No results for input(s): BNP in the last 72 hours. PT/INR: No results for input(s): PROTIME, INR in the last 72 hours. APTT: No results for input(s): APTT in the last 72 hours. CARDIAC ENZYMES: No results for input(s): CKMB, CKMBINDEX, TROPONINT in the last 72 hours.     Invalid input(s): CKTOTAL;3  FASTING LIPID PANEL:  Lab Results   Component Value Date    CHOL 111 04/25/2020    HDL 46 Eliquis      Electronically signed by Jose Ibarra MD on 5/13/2020 at 10:18 AM      This note is created with the assistance of a speech recognition program.  While intending to generate a document that actually reflects the content of the visit, the document can still have some errors including those of syntax and sound a like substitutions which may escape proof reading. In such instances, actual meaning can be extrapolated by contextual diversion.

## 2020-05-13 NOTE — PROGRESS NOTES
[facility]  Follow Up      NAME:  Latosha Hugo  MEDICAL RECORD NUMBER:  [id number]  AGE: 78 y.o. GENDER: male  : 1950  TODAY'S DATE:  2020      Follow up visit today. Coccyx wound appears to be closed at this time, but the area remains somewhat erythematous. Would recommend cont to keep clean and dry and use thin layer zinc paste. BLE appear unchanged. The patient does have chronic venous insufficiency with ruborous discoloration of the gaiter region both legs. Dry scaling noted with hyperkeratosis both lower legs. The left groin was assessed and found to have some moisture associated skin damage with denudation of skin noted in the crease. This will best be treated with Interdry Ag. Measurements:  Wound 20 Coccyx (Active)   Wound Pressure Stage  2 2020  9:08 AM   Dressing Status Dry; Intact 2020  9:08 AM   Dressing Changed Changed/New 5/10/2020  8:08 AM   Dressing/Treatment Moisture barrier 2020  9:08 AM   Wound Cleansed Soap and water 2020  9:08 AM   Wound Length (cm) 0 cm 2020  9:08 AM   Wound Width (cm) 0 cm 2020  9:08 AM   Wound Depth (cm) 0 cm 2020  9:08 AM   Wound Surface Area (cm^2) 0 cm^2 2020  9:08 AM   Change in Wound Size % (l*w) 100 2020  9:08 AM   Wound Volume (cm^3) 0 cm^3 2020  9:08 AM   Wound Healing % 100 2020  9:08 AM   Wound Assessment Dry; Intact; Red 2020  9:08 AM   Drainage Amount None 2020  9:08 AM   Drainage Description Serosanguinous 2020 10:57 PM   Odor None 2020  9:08 AM   Red%Wound Bed 100 2020 10:57 PM   Number of days: 7       Wound 20 Groin Left (Active)   Wound Other 2020  9:08 AM   Dressing/Treatment InterdryAG 2020  9:08 AM   Wound Cleansed Soap and water 2020 11:30 PM   Wound Assessment Drainage; Red 2020  9:08 AM   Drainage Amount Small 2020  9:08 AM   Drainage Description Serosanguinous 2020  9:08 AM   Odor None

## 2020-05-13 NOTE — PROGRESS NOTES
Physical Therapy  [address]  Acute Rehabilitation Physical Therapy Progress Note    Date: 20  Patient Name: Nayeli Corona       Room: 8245/8425-43  MRN: [id number]   Account: [de-identified]   : 1950  (78 y.o.) Gender: male     Referring Practitioner: Dr. Adia Meraz  Diagnosis: L MCA ischemic stroke, s/p emergent mechanical thrombectomy 20  Past Medical History:  has a past medical history of Arterial ischemic stroke, MCA (middle cerebral artery), left, acute ([address] Atrial fibrillation ([facility] [address] and Stroke ([facility] [address] Past Surgical History:   has a past surgical history that includes Gastrostomy tube placement (2020) and Upper gastrointestinal endoscopy (N/A, 2020). Additional Pertinent Hx: Admitted 20 for R side weakness and aphasia. has emergent mechanical thrombectomy 20. On 20 pt had EGD with PEG tube inserted, admitted to rehab on 5/3/20. Overall Orientation Status: (unable to assess, expressive aphasia)  Restrictions/Precautions  Restrictions/Precautions: Fall Risk;Up as Tolerated(PEG tube)  Required Braces or Orthoses?: Yes  Required Braces or Orthoses  Other: Abdominal Binder    Subjective: Pt not able to verbalize fully, but demo's ability to use communication board effectively & convey some points and thoughts with hand gestures and tone in sounds. Vital Signs  Patient Currently in Pain: Yes  Pain Assessment: Faces  [place]-Baker Pain Rating: Hurts little more  Pain Type: Acute pain  Pain Location: Leg  Pain Orientation: Left  Non-Pharmaceutical Pain Intervention(s): Rest;Repositioned; Ambulation/Increased Activity    Bed Mobility  Supine to Sit: Maximal assistance(R LE, trunk and scooting hips to EOB; HOB ~30*)  Sit to Supine: Minimal assistance(R LE, VCs for positioning upper body & L LE)  Scooting: Moderate assistance(hips to EOB; Min A to [facility] with Trendelenburg and HRs)  Bed mobility  Scooting: Moderate assistance(hips to EOB;  Loreta Enter risk.      05/13/20 1028 05/13/20 1432   PT Individual Minutes   Time In 1003 1405   Time Out 1120 1430   Minutes 77 25     Electronically signed by Aroldo Huerta PTA on 5/13/20 at 5:22 PM EDT

## 2020-05-13 NOTE — PROGRESS NOTES
Transfers  Equipment Used: Raised toilet seat with rails  Toilet Transfer: Dependent/Total  Toilet Transfers Comments: [employer]     Type of ROM/Therapeutic Exercise  Type of ROM/Therapeutic Exercise: PROM; Free weights  Comment: In an attempt to elicit muscle contractions in RUE, OT facilitated movement patterns while pt completed same active movements with LUE using 3# d-bell. Contraction felt during shoulder shrug, scapular retraction, shoulder ADDuction, and internal rotation when in gravity eliminated plane. Exercises  Scapular Protraction: x10  Scapular Retraction: x10  Shoulder Depression: x10  Shoulder Elevation: x10  Shoulder Flexion: x15  Shoulder Extension: x15  Shoulder ABduction: x10  Shoulder ADduction: x10  Elbow Flexion: x15  Elbow Extension: x15  Supination: x15 (no weight on L side)  Pronation: x15 (no weight on L side)  Wrist Flexion: x15 (no weight on L side)  Wrist Extension: x15 (no weight on L side)  Finger Flexion: x15 (no weight on L side)  Finger Extension: x15 (no weight on L side)  Other: Internal/external rotation x10     Neuromuscular Education  Joint Compression: To R shoulder/elbow/wrist  Response to Techniques: Pt tolerated well. Weight Bearing  Weight Bearing Technique: Yes  RUE Weight Bearing: Forearm seated  Response To Weight Bearing Technique: RUE placed on wheelchair support while pt reached to complete LB ADL's    ADL  Equipment Provided: Reacher;Long-handled sponge;Dressing stick  Feeding: NPO  Grooming: Stand by assistance(A to stabilize R hand; brushed hair thoroughly w/o cues)  UE Bathing: Minimal assistance(Demo'd inc'd IND w/elba-tech for RUE but still needed A)  LE Bathing: Moderate assistance(A for buttocks, backs of legs, & thoroughness of feet)  UE Dressing: Minimal assistance(to doff [place] shirt; A to initiate over L elbow)  LE Dressing:  Moderate assistance(Doff socks w/AE and SBA; TA to don socks)  Toileting: Dependent/Total(SS to toilet for BM, TA for hygiene, no unable to tolerate staying up in wheelchair at end of session 2* pain in LLE. Safety Devices in place: Yes  Type of devices: Patient at risk for falls;Gait belt;Left in bed;Call light within reach    Patient Education:  Patient Goals   Patient goals : Pt with expressive aphasia - unable to state goals, but agreeable to OT goals of increasing IND and addressing function of RUE. Demo'd agreement with a thumbs up. ADL/AE training, elba-techniques, encouraging verbal communication, purpose/goals of neuro re-ed  Learner:patient  Method: demonstration and explanation       Outcome: needs reinforcement     Plan  Plan  Times per week: 5-7  Times per day: Twice a day  Current Treatment Recommendations: Functional Mobility Training, Endurance Training, Safety Education & Training, Equipment Evaluation, Education, & procurement, Patient/Caregiver Education & Training, Self-Care / ADL, Strengthening, ROM, Balance Training, Neuromuscular Re-education, Positioning  Patient Goals   Patient goals : Pt with expressive aphasia - unable to state goals, but agreeable to OT goals of increasing IND and addressing function of RUE. Demo'd agreement with a thumbs up. Short term goals  Time Frame for Short term goals: One Week  Short term goal 1: Pt will complete eating (if swallowing improves for PO intake)/grooming with Min A using modified techniques as needed. Short term goal 2: Pt will complete UB bathing with Min A and LB bathing with Mod A and Good safety using AE/modified techniques as needed. Short term goal 3: Pt will complete UB dressing with Mod A and LB dressing with Max A using AE/modified techniques as needed. Short term goal 4: Pt will complete toilet transfer with Max A x1 and Good safety using appropriate DME. Short term goal 5: Pt will stand for 2-3 minutes with 1-2 UE support and Min A while participating in a functional task.   Short term goal 6: Pt will actively participate in 30 minutes of therapeutic

## 2020-05-13 NOTE — PROGRESS NOTES
Speech Language Pathology  Speech Language Pathology  [facility]    Speech Language Treatment Note    Date: 5/13/2020  Patients Name: Gen Borden  MRN: [id number]  Diagnosis:   Patient Active Problem List   Diagnosis Code    Cerebrovascular accident (CVA) ([facility] I63.9    Acute ischemic left MCA stroke ([facility] I63.512    Bradycardia R00.1    Aspiration pneumonia ([facility] J69.0    Leukocytosis D72.829    Essential hypertension I10    Atrial fibrillation ([facility] I48.91    Chronic acquired lymphedema I89.0    Oropharyngeal dysphagia R13.12    Acute CVA (cerebrovascular accident) ([facility] I63.9       Pain: pt. denies    Speech and Language Treatment  Treatment time:  [id number]    Subjective: [x] Alert [x] Cooperative     [] Confused     [] Agitated      [] Lethargic    Objective/Assessment:  Auditory Comprehension:  Pt. Able to follow general directions and answer y/n questions asked. Verbal Expression:  n/a d/t severe apraxia    Reading Comprehension:  N/a    Speech:       Pt. Repeated CVC syllables beginning c /l/ c 20%, 90% c cues, /w/ c 90%, 100% c cues and /h/ c 60%, 100% c cues. Name objects around room- 30%, 50% c cues. Count 1-10 c 70%, 100% phonemic cues. Many of pt. Utterances are closer approximations of target. Name body parts- 20%, 70% c cues. Pt. Very responsive to sentence completion cues. Pt. Very conversational, all utterances made on \"whoa\". Other:   Unable to reassess pt. Swallow function at bedside d/t h/o gross aspiration/inability to initiate swallow exercises d/t apraxia, will plan to do repeat MBS tomorrow pm.  Pt. Last MBS was on 04/27    Plan:  [x] Continue  services    [] Discharge from [address] [place] [address] :      Discharge recommendations: [] Inpatient Rehab   [] [place]   [] Outpatient Therapy  [] Follow up at trauma clinic   [] Other:       Treatment completed by: Lacretia Paget. A.CCC/SLP

## 2020-05-13 NOTE — PROGRESS NOTES
[facility] Internal Medicine    CONSULTATION / HISTORY AND PHYSICAL EXAMINATION            Date:   5/13/2020  Patient name:  Paula Chavez  Date of admission:  5/3/2020 12:51 PM  MRN:   [id number]  Account:  [de-identified]  YOB: 1950  PCP:    Melanie White MD  Room:   0053/6649-74  Code Status:    Full Code    Physician Requesting Consult: Lauro Max*    Reason for Consult: Medical management    Chief Complaint:     No chief complaint on file. Acute stroke  History Obtained From:     patient, electronic medical record    History of Present Illness/ Interval History:   Patient initially presented to [facility] with right-sided weakness, facial droop, aphasia. Underwent mechanical thrombectomy. PEG tube was placed for dysphagia.     tolerating tube feed  Reports left leg pain today    Past Medical History:     Past Medical History:   Diagnosis Date    Arterial ischemic stroke, MCA (middle cerebral artery), left, acute ([facility] 04/25/2020    Atrial fibrillation (HCC)     Stroke [facility])         Past Surgical History:     Past Surgical History:   Procedure Laterality Date    GASTROSTOMY TUBE PLACEMENT  05/01/2020    EGD    UPPER GASTROINTESTINAL ENDOSCOPY N/A 5/1/2020    ** CASE IN OR W/ GI STAFF - EGD ESOPHAGOGASTRODUODENOSCOPY PEG TUBE INSERTION performed by Claudia Choudhury, DO at [facility]        Medications Prior to Admission:     Prior to Admission medications    Medication Sig Start Date End Date Taking? Authorizing Provider   aspirin 81 MG chewable tablet 1 tablet by PEG Tube route daily 5/3/20   Amadeo Houser MD   atorvastatin (LIPITOR) 40 MG tablet 1 tablet by PEG Tube route nightly 5/3/20   Jc Lainze MD   lisinopril (PRINIVIL;ZESTRIL) 10 MG tablet Take 1 tablet by mouth daily 5/3/20   Amadeo Rey MD        Allergies:     Patient has no known allergies.     Social hemiparesis    Investigations:      Laboratory Testing:  Lab Results   Component Value Date    WBC 11.2 (H) 05/11/2020    HGB 13.3 (L) 05/11/2020    HCT 40.2 (L) 05/11/2020    MCV 87.2 05/11/2020     05/11/2020     Lab Results   Component Value Date     05/11/2020    K 4.6 05/11/2020     05/11/2020    CO2 25 05/11/2020    BUN 36 05/11/2020    CREATININE 1.00 05/11/2020    GLUCOSE 170 05/11/2020    CALCIUM 8.8 05/11/2020         Assessment :      Primary Problem  <principal problem not specified>    Active Hospital Problems    Diagnosis Date Noted    Acute CVA (cerebrovascular accident) ([facility] [I63.9] 05/03/2020    Atrial fibrillation ([facility] [I48.91] 04/30/2020    Essential hypertension [I10] 04/30/2020    Acute ischemic left MCA stroke ([facility] [I63.512]        Plan:     1. Right-sided weakness, Broca's aphasia, had acute left MCA infarct status post mechanical thrombectomy, on aspirin, Lipitor, had CT head   2. Atrial fibrillation, on Eliquis 5 mg twice a day  3. Dysphagia, status post PEG tube placement  4. Hypertension, Controlled . 5. Aspiration pneumonia, completed antibiotics  6 . Tolearting Tube feed     5/11-new left leg redness-start antibiotics for possible cellulitis. No fever or white cell count. Left leg Dopplers ordered-in process. 5/12- left leg redness better, less warm. Dopplers negative for DVT. 5/13-left leg pain and redness significantly improved. Continue and finish course of antibiotic. Otherwise stable, tolerating tube feeds. Consultations:   Nuris Oviedo MD  5/13/2020  4:25 PM    Copy sent to Dr. Sharyn Klinefelter, MD    Please note that this chart was generated using voice recognition Dragon dictation software. Although every effort was made to ensure the accuracy of this automated transcription, some errors in transcription may have occurred.

## 2020-05-14 ENCOUNTER — APPOINTMENT (OUTPATIENT)
Dept: GENERAL RADIOLOGY | Age: 70
DRG: 056 | End: 2020-05-14
Attending: PHYSICAL MEDICINE & REHABILITATION
Payer: MEDICARE

## 2020-05-14 LAB
GLUCOSE BLD-MCNC: 115 MG/DL (ref 75–110)
GLUCOSE BLD-MCNC: 119 MG/DL (ref 75–110)

## 2020-05-14 PROCEDURE — 1180000000 HC REHAB R&B

## 2020-05-14 PROCEDURE — 97535 SELF CARE MNGMENT TRAINING: CPT

## 2020-05-14 PROCEDURE — 6370000000 HC RX 637 (ALT 250 FOR IP): Performed by: PSYCHIATRY & NEUROLOGY

## 2020-05-14 PROCEDURE — 6370000000 HC RX 637 (ALT 250 FOR IP): Performed by: INTERNAL MEDICINE

## 2020-05-14 PROCEDURE — 92611 MOTION FLUOROSCOPY/SWALLOW: CPT

## 2020-05-14 PROCEDURE — 6370000000 HC RX 637 (ALT 250 FOR IP): Performed by: PHYSICAL MEDICINE & REHABILITATION

## 2020-05-14 PROCEDURE — 6370000000 HC RX 637 (ALT 250 FOR IP): Performed by: STUDENT IN AN ORGANIZED HEALTH CARE EDUCATION/TRAINING PROGRAM

## 2020-05-14 PROCEDURE — 97110 THERAPEUTIC EXERCISES: CPT

## 2020-05-14 PROCEDURE — 97530 THERAPEUTIC ACTIVITIES: CPT

## 2020-05-14 PROCEDURE — 82947 ASSAY GLUCOSE BLOOD QUANT: CPT

## 2020-05-14 PROCEDURE — 99232 SBSQ HOSP IP/OBS MODERATE 35: CPT | Performed by: PHYSICAL MEDICINE & REHABILITATION

## 2020-05-14 PROCEDURE — 92507 TX SP LANG VOICE COMM INDIV: CPT

## 2020-05-14 PROCEDURE — 74230 X-RAY XM SWLNG FUNCJ C+: CPT

## 2020-05-14 PROCEDURE — 99231 SBSQ HOSP IP/OBS SF/LOW 25: CPT | Performed by: INTERNAL MEDICINE

## 2020-05-14 RX ORDER — ONDANSETRON 4 MG/1
4 TABLET, FILM COATED ORAL EVERY 6 HOURS PRN
Status: DISCONTINUED | OUTPATIENT
Start: 2020-05-14 | End: 2020-05-23 | Stop reason: HOSPADM

## 2020-05-14 RX ADMIN — ANTI-FUNGAL POWDER MICONAZOLE NITRATE TALC FREE: 1.42 POWDER TOPICAL at 09:40

## 2020-05-14 RX ADMIN — ACETAMINOPHEN 1000 MG: 500 TABLET ORAL at 14:12

## 2020-05-14 RX ADMIN — ANTI-FUNGAL POWDER MICONAZOLE NITRATE TALC FREE: 1.42 POWDER TOPICAL at 20:39

## 2020-05-14 RX ADMIN — APIXABAN 5 MG: 5 TABLET, FILM COATED ORAL at 09:28

## 2020-05-14 RX ADMIN — SULFAMETHOXAZOLE AND TRIMETHOPRIM 1 TABLET: 800; 160 TABLET ORAL at 09:28

## 2020-05-14 RX ADMIN — ESOMEPRAZOLE MAGNESIUM 40 MG: 40 GRANULE, DELAYED RELEASE ORAL at 09:28

## 2020-05-14 RX ADMIN — APIXABAN 5 MG: 5 TABLET, FILM COATED ORAL at 20:30

## 2020-05-14 RX ADMIN — TRAZODONE HYDROCHLORIDE 50 MG: 50 TABLET ORAL at 20:30

## 2020-05-14 RX ADMIN — ACETAMINOPHEN 1000 MG: 500 TABLET ORAL at 06:04

## 2020-05-14 RX ADMIN — ASPIRIN 81 MG 81 MG: 81 TABLET ORAL at 09:28

## 2020-05-14 RX ADMIN — ONDANSETRON HYDROCHLORIDE 4 MG: 4 TABLET, FILM COATED ORAL at 15:12

## 2020-05-14 RX ADMIN — ACETAMINOPHEN 1000 MG: 500 TABLET ORAL at 20:30

## 2020-05-14 RX ADMIN — LISINOPRIL 20 MG: 20 TABLET ORAL at 09:28

## 2020-05-14 RX ADMIN — SULFAMETHOXAZOLE AND TRIMETHOPRIM 1 TABLET: 800; 160 TABLET ORAL at 20:31

## 2020-05-14 RX ADMIN — ATORVASTATIN CALCIUM 40 MG: 40 TABLET, FILM COATED ORAL at 20:30

## 2020-05-14 ASSESSMENT — PAIN DESCRIPTION - PROGRESSION
CLINICAL_PROGRESSION: GRADUALLY WORSENING

## 2020-05-14 ASSESSMENT — PAIN DESCRIPTION - LOCATION
LOCATION: LEG
LOCATION: LEG

## 2020-05-14 ASSESSMENT — PAIN DESCRIPTION - DESCRIPTORS: DESCRIPTORS: PATIENT UNABLE TO DESCRIBE

## 2020-05-14 ASSESSMENT — PAIN SCALES - GENERAL
PAINLEVEL_OUTOF10: 0
PAINLEVEL_OUTOF10: 2
PAINLEVEL_OUTOF10: 2
PAINLEVEL_OUTOF10: 0
PAINLEVEL_OUTOF10: 0
PAINLEVEL_OUTOF10: 2

## 2020-05-14 ASSESSMENT — PAIN DESCRIPTION - PAIN TYPE
TYPE: ACUTE PAIN
TYPE: ACUTE PAIN

## 2020-05-14 ASSESSMENT — PAIN DESCRIPTION - FREQUENCY: FREQUENCY: INTERMITTENT

## 2020-05-14 ASSESSMENT — PAIN DESCRIPTION - ORIENTATION
ORIENTATION: LEFT
ORIENTATION: LEFT

## 2020-05-14 NOTE — PLAN OF CARE
Problem: Falls - Risk of:  Goal: Will remain free from falls  Description: Will remain free from falls  Outcome: Met This Shift   Pt. Free of falls and injuries this shift. Problem: Falls - Risk of:  Goal: Absence of physical injury  Description: Absence of physical injury  Outcome: Met This Shift   No injuries this shift. Patient's safety maintained. Problem:  Activity:  Goal: Ability to tolerate increased activity will improve  Description: Ability to tolerate increased activity will improve  Outcome: Ongoing     Problem: Skin Integrity:  Goal: Will show no infection signs and symptoms  Description: Will show no infection signs and symptoms  Outcome: Ongoing     Problem: Skin Integrity:  Goal: Absence of new skin breakdown  Description: Absence of new skin breakdown  Outcome: Ongoing     Problem: Nutrition  Goal: Optimal nutrition therapy  Outcome: Ongoing     Problem: Pain:  Goal: Pain level will decrease  Description: Pain level will decrease  Outcome: Ongoing

## 2020-05-14 NOTE — PROGRESS NOTES
Speech Language Pathology  Speech Language Pathology  [facility]    Speech Language Treatment Note    Date: 5/14/2020  Patients Name: Latosha Hugo  MRN: [id number]  Diagnosis:   Patient Active Problem List   Diagnosis Code    Cerebrovascular accident (CVA) [facility]) I63.9    Acute ischemic left MCA stroke ([facility] I63.512    Bradycardia R00.1    [facility] J69.0    Leukocytosis D72.829    Essential hypertension I10    Atrial fibrillation ([facility] I48.91    Chronic acquired lymphedema I89.0    Oropharyngeal dysphagia R13.12    Acute CVA (cerebrovascular accident) ([facility] I63.9       Pain: 3-4/10  (R leg)    Speech and Language Treatment  Treatment time:  [id number]      Subjective: [x] Alert [x] Cooperative     [] Confused     [] Agitated      [] Lethargic    Objective/Assessment:  Auditory Comprehension:  Pt. Able to follow general directions and answer y/n questions asked. Verbal Expression:  n/a d/t severe apraxia    Reading Comprehension:  N/a    Speech:      Pt. Completed oral motor exercises x10 c mod A. No R sided movement continued when pt. Completing labial lateralization. Count 1-10- 30%, 70% c cues,  Name days of week - 4/7, 6/7 c cues. Confrontation naming- 80%, 100% c cues. Many of pt. Utterances are close approximations of target. Some approximations are closer than others. Pt. Very responsive to sentence completion cues and visual cues. Other:   Pt. To have repeat MBS this pm at 1300. Plan:  [x] Continue ST services    [] Discharge from ST:      Discharge recommendations: [] Inpatient Rehab   [] [place]   [] Outpatient Therapy  [] Follow up at trauma clinic   [] Other:       Treatment completed by: Mary Kate Ram A.CCC/SLP

## 2020-05-14 NOTE — PROGRESS NOTES
[facility] Internal Medicine    CONSULTATION / HISTORY AND PHYSICAL EXAMINATION            Date:   5/14/2020  Patient name:  Kim Gaines  Date of admission:  5/3/2020 12:51 PM  MRN:   [id number]  Account:  [de-identified]  YOB: 1950  PCP:    Juan Baer MD  Room:   64/4127-60  Code Status:    Full Code    Physician Requesting Consult: Nakul Max*    Reason for Consult: Medical management    Chief Complaint:     No chief complaint on file. Acute stroke  History Obtained From:     patient, electronic medical record    History of Present Illness/ Interval History:   Patient initially presented to [address] [place] [address] with right-sided weakness, facial droop, aphasia. Underwent mechanical thrombectomy. PEG tube was placed for dysphagia.     tolerating tube feed  Reports left leg pain today    Past Medical History:     Past Medical History:   Diagnosis Date    Arterial ischemic stroke, MCA (middle cerebral artery), left, acute ([facility] 04/25/2020    Atrial fibrillation (HCC)     Stroke [facility])         Past Surgical History:     Past Surgical History:   Procedure Laterality Date    GASTROSTOMY TUBE PLACEMENT  05/01/2020    EGD    UPPER GASTROINTESTINAL ENDOSCOPY N/A 5/1/2020    ** CASE IN OR W/ GI STAFF - EGD ESOPHAGOGASTRODUODENOSCOPY PEG TUBE INSERTION performed by Derick Lazo DO at [facility]        Medications Prior to Admission:     Prior to Admission medications    Medication Sig Start Date End Date Taking? Authorizing Provider   aspirin 81 MG chewable tablet 1 tablet by PEG Tube route daily 5/3/20   Amadeo Houser MD   atorvastatin (LIPITOR) 40 MG tablet 1 tablet by PEG Tube route nightly 5/3/20   Wesley Teresa MD   lisinopril (PRINIVIL;ZESTRIL) 10 MG tablet Take 1 tablet by mouth daily 5/3/20   Amadeo Danielle MD        Allergies:     Patient has no known allergies.     Social

## 2020-05-14 NOTE — PROCEDURES
INSTRUMENTAL SWALLOW REPORT  MODIFIED BARIUM SWALLOW    NAME: Laya Boyle   : 1950  MRN: [id number]       Date of Eval: 2020              Referring Diagnosis(es):  dysphagia    Past Medical History:  has a past medical history of Arterial ischemic stroke, MCA (middle cerebral artery), left, acute ([facility] [address] [address] Atrial fibrillation ([facility] [facility] and Stroke ([facility] Past Surgical History:  has a past surgical history that includes Gastrostomy tube placement (2020) and Upper gastrointestinal endoscopy (N/A, 2020). Current Diet Solid Consistency: NPO  Current Diet Liquid Consistency: NPO    Date of Prior Study:   Type of Study: Repeat MBS  Results of Prior Study:   Oral Phase: Impaired  Puree: Severely delay swallow, premature spillage to vallecula and pyriform  Honey thick: Mildly delay swallow, premature spillage to vallecule     Pharyngeal Phase: Impaired  Puree: +immediate penetration, +immediate gross aspiration, + delay cough, moderate residual at vallecula and pyriform   Honey thick: +immediate penetration that remained above the vocal fold and did not clear, +delay cough, mild residual at vallecula and pyriform     Recent CXR/CT of Chest: Date n/a    Patient Complaints/Reason for Referral:  Laya Boyle was referred for a MBS to assess the efficiency of his/her swallow function, assess for aspiration, and to make recommendations regarding safe dietary consistencies, effective compensatory strategies, and safe eating environment. Onset of problem:    Pt. Has been receiving ST for speech for apraxia since  while in ARU. Pt. Is NPO c PEG. Behavior/Cognition/Vision/Hearing:  Behavior/Cognition: Alert; Cooperative  Vision: Impaired  Vision Exceptions: Wears glasses at all times  Hearing: Within functional limits    Impressions:     Patient Position: Lateral     Consistencies Administered: Pudding teaspoon              Oral Phase: Reduced A-P transit, R sided oral residue, premature vallecular spillage. Pharyngeal: mod vallecular residue, min pyriform sinus cavity residue, deep penetration (trace amt). Test discontinued d/t risk of penetration and eventual aspiration of all PO intake. Dysphagia Outcome Severity Scale: Level 1: Severe dysphagia- NPO. Unable to tolerate any PO safely  Penetration-Aspiration Scale (PAS): 3 - Material enters the airway, remains above the vocal folds, and is not ejected from airway    Recommended Diet:  Solid consistency: NPO  Liquid consistency: NPO                  Recommendations/Treatment  Requires SLP Intervention: Yes                  Recommended Exercises:    Therapeutic Interventions: Oral motor exercises; Therapeutic PO trials with SLP; Tongue base strengthening         Education:  recommendations were reviewed with pt.  following this exam.      Patient Education Response: Demonstrated understanding           Goals:    Long Term:    Increase swallow function to establish diet at least restrictive consistency. Short Term:     Goal 1: Pt. will increase lingual/labial strength and ROM via oral motor exercises  Goal 2: Therapeutic trials c ST ONLY  Goal 3: Tongue base strengthening as pt. able to follow directions. Oral Preparation / Oral Phase  Oral Phase: Impaired        Pharyngeal Phase  Pharyngeal Phase: Impaired      Esophageal Phase  Esophageal Screen: WNL        Pain   Patient Currently in Pain: No      Therapy Time:   Individual Concurrent Group Co-treatment   Time In 1310         Time Out 1330         Minutes 20                 Tesfaye HALL A.CCC/SLP     5/14/2020, 1:53 PM

## 2020-05-14 NOTE — PROGRESS NOTES
[address] [facility]    ACUTE REHABILITATION OCCUPATIONAL THERAPY  DAILY NOTE    Date: 20  Patient Name: Aleyda Thompson      Room: 7538/2641-60    MRN: [id number]   : 1950  (78 y.o.)  Gender: male   Referring Practitioner: Dr. Rafael Lorenzo  Diagnosis: L MCA ischemic stroke, s/p emergent mechanical thrombectomy 20    Restrictions  Restrictions/Precautions: Fall Risk, Up as Tolerated(PEG tube)  Required Braces or Orthoses  Other: Abdominal Binder  Required Braces or Orthoses?: Yes  Equipment Used: Bed, Wheelchair    Subjective  Subjective: Pt indicating c/o nausea and dizziness after using toilet and standing in [facility] for hygiene/brief change. Comments: Pt's vitals taken once back in w/c: /70, HR 74, O2 98%. Pt's c/o persisted and pt returned to bed. BP taken again at 132/67. RN notified. Patient Currently in Pain: Yes  Pain Level: 2(at rest; increased to 4-5/10 by end of session)  Pain Location: Leg  Pain Orientation: Left  Restrictions/Precautions: Fall Risk;Up as Tolerated(PEG tube)    Pain Assessment  Pain Assessment: 0-10  Pain Level: 2(at rest; increased to 4-5/10 by end of session)  Patient's Stated Pain Goal: No pain  Pain Type: Acute pain  Pain Location: Leg  Pain Orientation: Left  Pain Descriptors: Patient unable to describe  Pain Frequency: Intermittent  Clinical Progression: Gradually worsening(throughout session; similar pattern to yesterday)  Response to Pain Intervention: Patient Satisfied  Multiple Pain Sites: No    Objective  Perception  Overall Perceptual Status: Impaired  Unilateral Attention: Cues to attend to right side of body  Motor Planning: Hand over hand to sequence tasks  Balance  Sitting Balance: Stand by assistance(SUP/SBA for static sitting; CGA for dynamic reaching)  Standing Balance: Minimal assistance(SBA for static in SS; Min A on R side for dynamic tasks)  Bed mobility  Supine to Sit: Moderate assistance  Sit to Supine:  Moderate assistance  Scooting: Minimal assistance(For R hip at EOB)  Comment: Pt declined to stay up in w/c at end of AM session 2* nausea, dizziness, and increasing pain in LLE. Transfers  Sit to stand: Minimal assistance; Moderate assistance(Varied based on height of surface; SBA from SS paddles)  Stand to sit: Moderate assistance(Cues/A provided to control descent)  Transfer Comments: A provided on R side; [employer] used for all transfers this date     Toilet Transfers  Equipment Used: Raised toilet seat with rails  Toilet Transfer: Dependent/Total  Toilet Transfers Comments: [employer]  Upper Extremity Function  NDT Treatment: Upper extremity     Weight Bearing  Weight Bearing Technique: Yes  RUE Weight Bearing: Forearm standing; Extended arm standing  Response To Weight Bearing Technique: OT provided support at elbow while standing/sitting in [employer] and completing dynamic tasks with LUE  Additional Activities: PM: Completed orthostatic BP with following results: lying 143/52, sitting 130/60, standing in /70. RN notified. Neuromuscular Education  Neuromuscular education: Yes  NDT Treatment: Upper extremity  Joint Compression: To R shoulder/elbow/wrist  Response to Techniques: Pt tolerated well. ADL  Feeding: NPO(PEG tube - swallow study today.)  Grooming: Stand by assistance(Washed face/hands, brushed hair, oral care w/green sponge)  UE Bathing: Minimal assistance(Increased IND w/elba-technique for L axillary)  LE Bathing: Moderate assistance(Pt washed ashlyn-area/front of thighs; OT A with buttocks)  UE Dressing: None(Gown only this date.)  LE Dressing: None(Not addressed this date.)  Toileting: Dependent/Total(A for all tasks with use of toilet for BM)  Additional Comments: Pt transferred from bed to toilet with [employer] and had BM. Pt stood in SS for hygiene and brief change. Pt indicated that he needed to sit back in w/c due to nausea and dizziness.   Pt completed UB bathing/grooming at sink but c/o persisted and pt appeared flush. Pt taken out of BR to cool off and reported mild improvement in symptoms. Pt donned hospital gown, vitals were taken, and pt returned to bed per his request.    Assessment  Performance deficits / Impairments: Decreased functional mobility ; Decreased ADL status; Decreased ROM; Decreased strength;Decreased endurance;Decreased sensation;Decreased balance;Decreased fine motor control;Decreased coordination;Decreased posture  Prognosis: Good  Discharge Recommendations: 24 hour supervision or assist  Activity Tolerance: Patient limited by pain;Treatment limited secondary to medical complications (free text)  Activity Tolerance: Pt limited by nausea and dizziness in AM  Safety Devices in place: Yes  Type of devices: Patient at risk for falls;Gait belt;Left in bed;Call light within reach    Patient Education:  Patient Goals   Patient goals : Pt with expressive aphasia - unable to state goals, but agreeable to OT goals of increasing IND and addressing function of RUE. Demo'd agreement with a thumbs up. Activity promotion, discussed vitals and possible relation to symptoms of nausea/dizziness, ADL training  Learner:patient  Method: demonstration and explanation       Outcome: demonstrated understanding and needs reinforcement     Plan  Plan  Times per week: 5-7  Times per day: Twice a day  Current Treatment Recommendations: Functional Mobility Training, Endurance Training, Safety Education & Training, Equipment Evaluation, Education, & procurement, Patient/Caregiver Education & Training, Self-Care / ADL, Strengthening, ROM, Balance Training, Neuromuscular Re-education, Positioning  Patient Goals   Patient goals : Pt with expressive aphasia - unable to state goals, but agreeable to OT goals of increasing IND and addressing function of RUE. Demo'd agreement with a thumbs up. Short term goals  Time Frame for Short term goals:  One Week  Short term goal 1: Pt will complete eating (if

## 2020-05-14 NOTE — PROGRESS NOTES
Physical Medicine & Rehabilitation  Progress Note      Subjective:      78year-old gentleman with R nondominant hemiparesis, aphasia, and dysphagia secondary to ischemic CVA. Patient is well, but has had some minor complaints of nausea/vomiting and pain in the L leg, requiring pain medications    ROS:  Denies fevers, chills, sweats. No chest pain, palpitations, lightheadedness. Denies coughing, wheezing or shortness of breath. Denies abdominal pain, nausea, diarrhea or constipation. No new areas of joint pain. Denies new areas of numbness or weakness. Denies new anxiety or depression issues. No new skin problems. Rehabilitation:   Progressing in therapies. PT:  Restrictions/Precautions: Fall Risk, Up as Tolerated(PEG tube)  Required Braces or Orthoses  Other: Abdominal Binder   Transfers  Sit to Stand: Minimal Assistance, 2 Person Assistance(Walker lift; placement of R LE/UE with education)  Stand to sit: Minimal Assistance, 2 Person Assistance(walker lift; G eccentric control, no return to hand cues)  Bed to Chair: Dependent/Total(Standing with Alexandra Meraz)  Stand Pivot Transfers: Moderate Assistance, Maximum Assistance(x1 assist; increase assistance required with fatigue)  Squat Pivot Transfers: Maximum Assistance(to L; no return to safe hand placement cue)  Comment: Patient requiring increased assist this AM, use of umang stedy vs stand pivot for safety 2* increased pain. Patient difficulty WBing on LLE this date. Ambulation 1  Surface: level tile  Device: (walker Lift)  Other Apparatus: Wheelchair follow  Assistance:  Moderate assistance, 2 Person assistance(+ CGA for brief)  Quality of Gait: tactile and vc's for weight shifting and advancing right LE while maintaining extension of right knee during stance phase, assist with right LE during swing phase at times, fatigued quickly  Gait Deviations: Slow Catherine, Decreased step length, Decreased step height(rear wheels on walker lift locked to maintain in LLE. Transfers  Sit to stand: Minimal assistance, Moderate assistance(Varied depending on height of surface)  Stand to sit: Moderate assistance(Continues to need cues/A to control descent)  Transfer Comments: A provided on R side; Claudetta Sing used for all transfers this date   Toilet Transfers  Equipment Used: Raised toilet seat with rails  Toilet Transfer: Dependent/Total  Toilet Transfers Comments: Claudetta Sing        Wheelchair Marsh [employer] Bj Used: [employer], Wheelchair  Level of Asssistance: Dependent/Total  Wheelchair Transfers Comments: Claudetta Sing    SPEECH:  Subjective: [x]? Alert     [x]? Cooperative     []? Confused     []? Agitated      []? Lethargic     Objective/Assessment:  Auditory Comprehension:  Pt. Able to follow general directions and answer y/n questions asked.      Verbal Expression:  n/a d/t severe apraxia     Reading Comprehension:  N/a     Speech:        Pt. Repeated CVC syllables beginning c /l/ c 20%, 90% c cues, /w/ c 90%, 100% c cues and /h/ c 60%, 100% c cues. Name objects around room- 30%, 50% c cues. Count 1-10 c 70%, 100% phonemic cues. Many of pt. Utterances are closer approximations of target. Name body parts- 20%, 70% c cues. Pt. Very responsive to sentence completion cues.       Pt. Very conversational, all utterances made on \"whoa\". Other:   Unable to reassess pt. Swallow function at bedside d/t h/o gross aspiration/inability to initiate swallow exercises d/t apraxia, will plan to do repeat MBS tomorrow pm.  Pt. Last MBS was on 04/27    Objective:  /60   Pulse 56   Temp 97.3 °F (36.3 °C) (Axillary)   Resp 16   Ht 5' 10\" (1.778 m)   Wt (!) 320 lb (145.2 kg)   SpO2 96%   BMI 45.92 kg/m²       GEN: Well developed, well nourished, in NAD  HEENT:  NCAT.  PERRL.  EOMI.  Mucous membranes pink and moist.   PULM:  Clear to ausculation. No rales or rhonchi. Respirations WNL and unlabored. CV:  Bradycardic rate regular rhythm.  No murmurs or gallops.   GI:

## 2020-05-14 NOTE — PROGRESS NOTES
Physical Therapy  [address]   Acute Rehabilitation Physical Therapy Progress Note    Date: 20  Patient Name: Ruma Cruz       Room: 6216/1196-52  MRN: [id number]   Account: [de-identified]   : 1950  (78 y.o.) Gender: male     Referring Practitioner: Dr. Delonte Savage  Diagnosis: L MCA ischemic stroke, s/p emergent mechanical thrombectomy 20  Past Medical History:  has a past medical history of Arterial ischemic stroke, MCA (middle cerebral artery), left, acute ([address] Atrial fibrillation ([facility] [address] and Stroke ([facility] [address] Past Surgical History:   has a past surgical history that includes Gastrostomy tube placement (2020) and Upper gastrointestinal endoscopy (N/A, 2020). Additional Pertinent Hx: Admitted 20 for R side weakness and aphasia. has emergent mechanical thrombectomy 20. On 20 pt had EGD with PEG tube inserted, admitted to rehab on 5/3/20. Overall Orientation Status: (unable to assess, expressive aphasia)  Restrictions/Precautions  Restrictions/Precautions: Fall Risk;Up as Tolerated(PEG tube)  Required Braces or Orthoses?: Yes  Required Braces or Orthoses  Other: Abdominal Binder    Subjective: Pt not able to verbalize fully, but indicates continued nausea at a.m. PT, deferred standing this a.m. due to pain in L LE. Deferred amb in afternoon, but agreeable to seated ex & standing for Vertell Sedgwick transfer. Comments: Davian Block reporting pt possibly experiencing orthostatic hypotension, requested standing BP, and nausea & continued pain in L LE; reports BPs that support OH, possible dehydration, hypovolemia being investigated. SLP Harpal Bernard noted improvement in swallow study, but pt to continue NPO/tube feedings.      Vital Signs  Patient Currently in Pain: Yes  Pain Assessment: 0-10  Pain Level: 2  Pain Type: Acute pain  Pain Location: Leg  Pain Orientation: Left  Non-Pharmaceutical Pain Intervention(s): Repositioned;Rest;Ambulation/Increased Activity  Response to Pain Intervention: Patient Satisfied;None    Bed Mobility  Rolling: Moderate assistance;Rolling Left;Minimal assistance;Rolling Right(Bed flat, rail, 1 pillow: Min R, Mod L)  Supine to Sit: (R LE, trunk and scooting hips to EOB; HOB ~30*)  Sit to Supine: Minimal assistance(R LE, VCs for positioning upper body & L LE. Bed flat.)  Scooting: Maximal assistance;2 Person assistance;Minimal assistance(hips laterally in bed Max x2; Min x1 to scoot HOB (trendel))    Transfers:  Sit to Stand: Minimal Assistance(Angélica Echeverria; placement of R LE/UE with education)  Stand to sit: Minimal Assistance(Angélica Stedy; demo's good eccentric control. )  Bed to Chair: Dependent/Total(Angélica Stedy)    BALANCE Posture: Fair  Sitting - Static: Fair;+(EOB, single UE support, no back support)  Sitting - Dynamic: Fair(EOB, single UE support, no back support)  Standing - Static: Fair(Angélica Echeverria)    EXERCISES    Other exercises?: Yes  Other exercises 1: Seated bilat LE therex, 20x, PROM/AAROM R LE, 3 lb L LE  Other exercises 2: Purple (max) resistance band for seated B LE ex, 20 each  Other exercises 3: Sit to Stand x2, Braeden Hussein  Other exercises 6: Supine B LE ther ex, 3# L LE, A/AROM R LE, 20x each  Other exercises 8: Toilet transfer x1(Urinal, 1x supine )    Activity Tolerance: Patient limited by endurance, Patient limited by pain, Treatment limited secondary to medical complications (free text)(Nausea, L LE pain)    Current Treatment Recommendations: Strengthening, Functional Mobility Training, Transfer Training, Gait Training, Safety Education & Training, Balance Training, Endurance Training, Wheelchair Mobility Training, Patient/Caregiver Education & Training, Equipment Evaluation, Education, & procurement, Modalities    Conditions Requiring Skilled Therapeutic Intervention  Body structures, Functions, Activity limitations: Decreased functional mobility ; Decreased strength;Decreased endurance;Decreased sensation;Decreased balance  Assessment: Pt self-limiting today due to nausea, L LE  Barriers to Learning: Aphasic  REQUIRES PT FOLLOW UP: Yes  Discharge Recommendations: Patient would benefit from continued therapy after discharge;24 hour supervision or assist;Home with Home health PT    Goals  Short term goals  Time Frame for Short term goals: 10 days  Short term goal 1: Pt to perform bed mobility/rolling to left at mod A. Short term goal 2: Improve sitting dynamic balance to fair+  Short term goal 3: Pt to perform sit to stand at min/mod A from w/c/bed  Short term goal 4: Pt able to manuever w/c on level surfaces dist of 50 ft with L UE/L LE  Short term goal 5: Pt able to perform supine<>sit at mod A   Short term goal 6: Progress pt to pivot transfers on strong side with 2 person assist.  Long term goals  Time Frame for Long term goals : By DC  Long term goal 1: Pt able to perform bed mobility at min A   Long term goal 2: Pt able to perform sit to stand at min A    Long term goal 3: Pt able to perform transfers at min a (strong side)/mod a (weak side)  Long term goal 4: Pt able to  // bars for 5 to 10 minutes at min/mod A to perform pregait activiites  Long term goal 5: Progress pt to ambulation either in // bars or with appropriate assistive device dist of 20 to 30 ft, mod A, 2nd person CGA. Long term goal 6: Pt able to manuever w/c on level surface, dist of 150 ft, at supervsion level. Long term goal 7: Improve PASS score from 9/36 to 19/36 reduce fall risk.         05/14/20 1205 05/14/20 1527   PT Individual Minutes   Time In 1040 1410   Time Out 2555 [id number]   Minutes 41 35     Electronically signed by Valdemar Schaeffer PTA on 5/14/20 at 4:58 PM EDT

## 2020-05-14 NOTE — DISCHARGE INSTR - COC
Continuity of Care Form    Patient Name: Alyssa Todd   :  1950  MRN:  [id number]    Admit date:  5/3/2020  Discharge date:  2020    Code Status Order: Full Code   Advance Directives:   Advance Care Flowsheet Documentation     Date/Time Healthcare Directive Type of Healthcare Directive Copy in [address] Agent's Name Healthcare Agent's Phone Number    [phone]  Unknown, patient unable to respond due to medical condition -- -- -- -- --          Admitting Physician:  Jonathan Copeland MD  PCP: Jaylin Gabriel MD    Discharging Nurse: [facility] Unit/Room#: 2626/2626-01  Discharging Unit Phone Number: [phone]    Emergency Contact:   Extended Emergency Contact Information  Primary Emergency Contact: Sharath Bean  Address: [address] Phone: [phone]  Mobile Phone: [phone]  Relation: Child  Preferred language: English   needed? No  Secondary Emergency Contact: Annmariese Quezadahy  Address: [address] [place] [address] Phone: [phone]  Mobile Phone: [phone]  Relation: Child    Past Surgical History:  Past Surgical History:   Procedure Laterality Date    GASTROSTOMY TUBE PLACEMENT  2020    EGD    UPPER GASTROINTESTINAL ENDOSCOPY N/A 2020    ** CASE IN OR W/ GI STAFF - EGD ESOPHAGOGASTRODUODENOSCOPY PEG TUBE INSERTION performed by Dayan Hernández DO at [facility]       Immunization History: There is no immunization history on file for this patient.     Active Problems:  Patient Active Problem List   Diagnosis Code    Cerebrovascular accident (CVA) ([address] I63.9    Acute ischemic left MCA stroke ([facility] [address] I63.512    Bradycardia R00.1    Aspiration pneumonia (HCC) J69.0    Leukocytosis D72.829    Essential hypertension I10    Atrial fibrillation (HCC) I48.91    Chronic acquired lymphedema I89.0    Oropharyngeal dysphagia R13.12    Risk of Unplanned Readmission:        13           Discharging to Facility/ [address] of [address] [facility]. [place] [address]  Phone [phone]    Dialysis Facility (if applicable)   · Name:  · Address:  · Dialysis Schedule:  · Phone:  · Fax:    / signature: Electronically signed by JUANA Orozco, JAMEEW on 5/21/20 at 12:19 PM EDT    PHYSICIAN SECTION    Prognosis: Fair    Condition at Discharge: Stable    Rehab Potential (if transferring to Rehab): Fair    Recommended Labs or Other Treatments After Discharge: PT/OT/speech to eval and treat. Tube feed nocturnal/bolus with free water flushes as directed. Weekly CBC and BMP to monitor Hb, Na, and renal function. Physician Certification: I certify the above information and transfer of Ailin Shook  is necessary for the continuing treatment of the diagnosis listed and that he requires [facility] for less 30 days.      Update Admission H&P: No change in H&P    PHYSICIAN SIGNATURE:  Electronically signed by Donald Robles MD on 5/22/20 at 12:52 PM EDT

## 2020-05-14 NOTE — FLOWSHEET NOTE
SC visit by phone with patient; [address] was not able to understand very much of patient's conversation.      05/14/20 1930   Encounter Summary   Services provided to: Patient  (by phone)   Referral/Consult From: Rounding   Continue Visiting   (5-14-20)   Complexity of Encounter Low   Length of Encounter 15 minutes   Routine   Type Initial   Assessment Approachable   Intervention Active listening;[place];Sustaining presence/ [employer] presence

## 2020-05-15 PROBLEM — I69.391 DYSPHAGIA DUE TO RECENT CEREBRAL INFARCTION: Status: ACTIVE | Noted: 2020-04-30

## 2020-05-15 LAB
GLUCOSE BLD-MCNC: 107 MG/DL (ref 75–110)
GLUCOSE BLD-MCNC: 116 MG/DL (ref 75–110)
GLUCOSE BLD-MCNC: 119 MG/DL (ref 75–110)
GLUCOSE BLD-MCNC: 139 MG/DL (ref 75–110)
GLUCOSE BLD-MCNC: 140 MG/DL (ref 75–110)
GLUCOSE BLD-MCNC: 158 MG/DL (ref 75–110)
GLUCOSE BLD-MCNC: 176 MG/DL (ref 75–110)

## 2020-05-15 PROCEDURE — 97112 NEUROMUSCULAR REEDUCATION: CPT

## 2020-05-15 PROCEDURE — 92507 TX SP LANG VOICE COMM INDIV: CPT

## 2020-05-15 PROCEDURE — 6370000000 HC RX 637 (ALT 250 FOR IP): Performed by: INTERNAL MEDICINE

## 2020-05-15 PROCEDURE — 6370000000 HC RX 637 (ALT 250 FOR IP): Performed by: STUDENT IN AN ORGANIZED HEALTH CARE EDUCATION/TRAINING PROGRAM

## 2020-05-15 PROCEDURE — 97530 THERAPEUTIC ACTIVITIES: CPT

## 2020-05-15 PROCEDURE — 6370000000 HC RX 637 (ALT 250 FOR IP): Performed by: PSYCHIATRY & NEUROLOGY

## 2020-05-15 PROCEDURE — 97110 THERAPEUTIC EXERCISES: CPT

## 2020-05-15 PROCEDURE — 1180000000 HC REHAB R&B

## 2020-05-15 PROCEDURE — 97535 SELF CARE MNGMENT TRAINING: CPT

## 2020-05-15 PROCEDURE — 6370000000 HC RX 637 (ALT 250 FOR IP): Performed by: PHYSICAL MEDICINE & REHABILITATION

## 2020-05-15 PROCEDURE — 92526 ORAL FUNCTION THERAPY: CPT

## 2020-05-15 PROCEDURE — 99231 SBSQ HOSP IP/OBS SF/LOW 25: CPT | Performed by: INTERNAL MEDICINE

## 2020-05-15 PROCEDURE — 99231 SBSQ HOSP IP/OBS SF/LOW 25: CPT | Performed by: PHYSICAL MEDICINE & REHABILITATION

## 2020-05-15 PROCEDURE — 82947 ASSAY GLUCOSE BLOOD QUANT: CPT

## 2020-05-15 PROCEDURE — 97542 WHEELCHAIR MNGMENT TRAINING: CPT

## 2020-05-15 RX ADMIN — ESOMEPRAZOLE MAGNESIUM 40 MG: 40 GRANULE, DELAYED RELEASE ORAL at 09:31

## 2020-05-15 RX ADMIN — ANTI-FUNGAL POWDER MICONAZOLE NITRATE TALC FREE: 1.42 POWDER TOPICAL at 20:17

## 2020-05-15 RX ADMIN — APIXABAN 5 MG: 5 TABLET, FILM COATED ORAL at 20:07

## 2020-05-15 RX ADMIN — ATORVASTATIN CALCIUM 40 MG: 40 TABLET, FILM COATED ORAL at 20:08

## 2020-05-15 RX ADMIN — APIXABAN 5 MG: 5 TABLET, FILM COATED ORAL at 09:31

## 2020-05-15 RX ADMIN — TRAZODONE HYDROCHLORIDE 50 MG: 50 TABLET ORAL at 20:08

## 2020-05-15 RX ADMIN — SULFAMETHOXAZOLE AND TRIMETHOPRIM 1 TABLET: 800; 160 TABLET ORAL at 09:31

## 2020-05-15 RX ADMIN — ACETAMINOPHEN 1000 MG: 500 TABLET ORAL at 14:39

## 2020-05-15 RX ADMIN — ONDANSETRON HYDROCHLORIDE 4 MG: 4 TABLET, FILM COATED ORAL at 09:31

## 2020-05-15 RX ADMIN — LISINOPRIL 20 MG: 20 TABLET ORAL at 09:31

## 2020-05-15 RX ADMIN — ASPIRIN 81 MG 81 MG: 81 TABLET ORAL at 09:31

## 2020-05-15 RX ADMIN — ANTI-FUNGAL POWDER MICONAZOLE NITRATE TALC FREE: 1.42 POWDER TOPICAL at 09:21

## 2020-05-15 RX ADMIN — SULFAMETHOXAZOLE AND TRIMETHOPRIM 1 TABLET: 800; 160 TABLET ORAL at 20:08

## 2020-05-15 RX ADMIN — ACETAMINOPHEN 1000 MG: 500 TABLET ORAL at 20:07

## 2020-05-15 RX ADMIN — ACETAMINOPHEN 1000 MG: 500 TABLET ORAL at 05:32

## 2020-05-15 ASSESSMENT — PAIN DESCRIPTION - PROGRESSION
CLINICAL_PROGRESSION: GRADUALLY IMPROVING
CLINICAL_PROGRESSION: GRADUALLY WORSENING
CLINICAL_PROGRESSION: GRADUALLY IMPROVING
CLINICAL_PROGRESSION: GRADUALLY WORSENING
CLINICAL_PROGRESSION: GRADUALLY IMPROVING
CLINICAL_PROGRESSION: GRADUALLY IMPROVING
CLINICAL_PROGRESSION: GRADUALLY WORSENING
CLINICAL_PROGRESSION: GRADUALLY IMPROVING
CLINICAL_PROGRESSION: GRADUALLY WORSENING
CLINICAL_PROGRESSION: GRADUALLY WORSENING
CLINICAL_PROGRESSION: GRADUALLY IMPROVING
CLINICAL_PROGRESSION: NOT CHANGED
CLINICAL_PROGRESSION: GRADUALLY WORSENING
CLINICAL_PROGRESSION: GRADUALLY IMPROVING
CLINICAL_PROGRESSION: GRADUALLY WORSENING
CLINICAL_PROGRESSION: GRADUALLY IMPROVING
CLINICAL_PROGRESSION: GRADUALLY WORSENING

## 2020-05-15 ASSESSMENT — PAIN DESCRIPTION - FREQUENCY: FREQUENCY: INTERMITTENT

## 2020-05-15 ASSESSMENT — PAIN DESCRIPTION - ORIENTATION
ORIENTATION: LEFT

## 2020-05-15 ASSESSMENT — PAIN SCALES - GENERAL
PAINLEVEL_OUTOF10: 0
PAINLEVEL_OUTOF10: 4
PAINLEVEL_OUTOF10: 2
PAINLEVEL_OUTOF10: 0
PAINLEVEL_OUTOF10: 2
PAINLEVEL_OUTOF10: 0
PAINLEVEL_OUTOF10: 0

## 2020-05-15 ASSESSMENT — PAIN DESCRIPTION - PAIN TYPE
TYPE: ACUTE PAIN

## 2020-05-15 ASSESSMENT — PAIN - FUNCTIONAL ASSESSMENT: PAIN_FUNCTIONAL_ASSESSMENT: ACTIVITIES ARE NOT PREVENTED

## 2020-05-15 ASSESSMENT — PAIN DESCRIPTION - DESCRIPTORS: DESCRIPTORS: PATIENT UNABLE TO DESCRIBE

## 2020-05-15 ASSESSMENT — PAIN SCALES - WONG BAKER: WONGBAKER_NUMERICALRESPONSE: 0

## 2020-05-15 ASSESSMENT — PAIN DESCRIPTION - LOCATION
LOCATION: HIP;LEG
LOCATION: KNEE
LOCATION: HIP;LEG

## 2020-05-15 NOTE — PROGRESS NOTES
Physical Therapy  [address]  Acute Rehabilitation Physical Therapy Progress Note    Date: 5/15/20  Patient Name: Janis Ladd       Room: 0985/8990-64  MRN: [id number]   Account: [de-identified]   : 1950  (78 y.o.) Gender: male     Referring Practitioner: Dr. Nubia Corona  Diagnosis: L MCA ischemic stroke, s/p emergent mechanical thrombectomy 20  Past Medical History:  has a past medical history of Arterial ischemic stroke, MCA (middle cerebral artery), left, acute ([facility] [address] Atrial fibrillation ([facility] [address] and Stroke ([facility] [address] Past Surgical History:   has a past surgical history that includes Gastrostomy tube placement (2020) and Upper gastrointestinal endoscopy (N/A, 2020). Additional Pertinent Hx: Admitted 20 for R side weakness and aphasia. has emergent mechanical thrombectomy 20. On 20 pt had EGD with PEG tube inserted, admitted to rehab on 5/3/20. Overall Orientation Status: (unable to assess, expressive aphasia)  Restrictions/Precautions  Restrictions/Precautions: Fall Risk;Up as Tolerated(PEG tube)  Required Braces or Orthoses?: Yes  Required Braces or Orthoses  Other: Abdominal Binder    Subjective: Pt not able to verbalize fully, but indicates continued nausea & L LE pain at a.m. PT; indicated preference for bed ex rather than remaining in chair as a result. Agreeable to attempt amb in afternoon. Requests seat in recliner at end of p.m. session. Comments: Janki Rivera reports improved ADL session, pt agreeable to remaining in W/C until PT. Upon entry, nursing was setting up bolus feeding via PEG; tx deferred until after completion. Pt spitting & coughing up substance that has color & consistency of TPN fluid after a.m. bolus (HOB >45*). Amb deferred in afternoon due to need to change brief, pants.      Vital Signs  Patient Currently in Pain: Yes  Pain Assessment: 0-10  Pain Level: 4  Pain Type: Acute pain  Pain Location: Knee  Pain Orientation:

## 2020-05-15 NOTE — PLAN OF CARE
Problem: Falls - Risk of:  Goal: Will remain free from falls  5/15/2020 1637 by Eric Stuart RN  Outcome: Ongoing    Problem: Falls - Risk of:  Goal: Absence of physical injury  Outcome: Ongoing     Problem:  Activity:  Goal: Ability to tolerate increased activity will improve  5/15/2020 1637 by Eric Stuart RN  Outcome: Ongoing     Problem: Skin Integrity:  Goal: Absence of new skin breakdown  Outcome: Ongoing     Problem: Nutrition  Goal: Optimal nutrition therapy  5/15/2020 1637 by Eric Stuart RN  Outcome: Ongoing

## 2020-05-15 NOTE — PROGRESS NOTES
Outpatient Therapy  [] Follow up at trauma clinic   [] Other:       Treatment completed by: Norberto Sommers A.CCC/SLP

## 2020-05-15 NOTE — PROGRESS NOTES
Moderate assistance  Scooting: Minimal assistance(for R hip at EOB)  Comment: Pt left up in w/c at end of AM session  Transfers  Sit to stand: Minimal assistance; Moderate assistance(Varied depending on height of surface)  Stand to sit: Moderate assistance(A on R side to control descent/protect RUE)  Transfer Comments: A provided on R side; Alexandra Meraz used for all transfers this date     Toilet Transfers  Equipment Used: Raised toilet seat with rails  Toilet Transfer: Dependent/Total  Toilet Transfers Comments: Alexandra Meraz  Upper Extremity Function  NDT Treatment: Upper extremity  Type of ROM/Therapeutic Exercise  Type of ROM/Therapeutic Exercise: PROM; Free weights  Comment: In an attempt to elicit muscle contractions in RUE, OT facilitated movement patterns while pt completed same active movements with LUE using 3# d-bell. Contraction felt during shoulder shrug, scapular retraction, shoulder ADDuction, internal rotation, and elbow extension when in gravity eliminated plane. Exercises  Scapular Protraction: x10  Scapular Retraction: x10  Shoulder Depression: x10  Shoulder Elevation: x10  Shoulder Flexion: x15  Shoulder Extension: x15  Shoulder ABduction: x10  Shoulder ADduction: x10  Horizontal ABduction: x10  Horizontal ADduction: x10  Elbow Flexion: x15  Elbow Extension: x15  Supination: x15 (no weight on L side)  Pronation: x15 (no weight on L side)  Wrist Flexion: x15 (no weight on L side)  Wrist Extension: x15 (no weight on L side)  Finger Flexion: x15 (no weight on L side)  Finger Extension: x15 (no weight on L side)  Other: Internal/external rotation x10; PNF patterns x10    Neuromuscular Education  Neuromuscular education: Yes  NDT Treatment: Upper extremity  Joint Compression: To R shoulder/elbow/wrist  Response to Techniques: Pt tolerated well.      ADL  Equipment Provided: Reacher;Long-handled sponge;Dressing stick  Feeding: NPO(PEG tube)  Grooming: Stand by assistance;Verbal cueing  UE Bathing: Minimal

## 2020-05-15 NOTE — PROGRESS NOTES
Nutrition Assessment (Enteral Nutrition)    Type and Reason for Visit: Reassess    Nutrition Recommendations: Continue current tube feeding. NPO. Nutrition Assessment: Nurse reports that pt appears to be tolerating tube feeding fairly well. Pt had been having trouble with nausea and vomiting at times. Zofran has been given and seems to help with nausea; pt did better with the bolus feeding today. SLP did report belching, spitting up tube feeding duing dysphagia treatment this morning. Malnutrition Assessment:  · Malnutrition Status: At risk for malnutrition  · Context: Acute illness or injury  · Findings of the 6 clinical characteristics of malnutrition (Minimum of 2 out of 6 clinical characteristics is required to make the diagnosis of moderate or severe Protein Calorie Malnutrition based on AND/ASPEN Guidelines):  1. Energy Intake-Greater than 75% of estimated energy requirement, Greater than or equal to 7 days    2. Weight Loss-Unable to assess,    3. Fat Loss-Unable to assess,    4. Muscle Loss-No significant muscle mass loss,    5. Fluid Accumulation-Moderate to severe fluid accumulation, Extremities  6.  Strength-Not measured    Nutrition Risk Level: High    Nutrition Needs:  · Estimated Daily Total Kcal: 2230 kcal based on Gilpin-St. Jeor with 1 factor (145.2 kg used)  · Estimated Daily Protein (g): 112-136 gm of protein based on 1.5-1.8 gm/kg of ideal weight   · Estimated Daily Fluid (ml/day): 2160 based on 1 mL per kcal provided (may vary with Sodium level, overall condition)    Nutrition Diagnosis:   · Problem: Swallowing difficulty  · Etiology: related to Difficulty swallowing     Signs and symptoms:  as evidenced by NPO status due to medical condition, Nutrition support - EN, Swallow study results    Objective Information:  · Nutrition-Focused Physical Findings: Edema: +1 RUE; Trace LUE; +3 RLE, LLE.    · Wound Type: Pressure Ulcer, Stage II(Left Groin Wound; Pressure ulcer on odilia)  · Current Nutrition Therapies:  · Oral Diet Orders: NPO   · Tube Feeding (TF) Orders:   · Feeding Route: Gastrostomy  · Formula: Semi-elemental  · Rate (ml/hr):90 mL per hr from 8 pm to 6 am; bolus 300 mL x 3 daily    · Volume (ml/day): 1800 mL/day  · Duration: Continuous, Bolus  · Water Flushes: 200 mL x 4 daily; 30 mL before and after each bolus feeding  · Current TF & Flush Orders Provides: 2160 kcal, 135 gm protein, 2440 mL free fluid  · Anthropometric Measures:  · Ht: 5' 10\" (177.8 cm)   · Current Body Wt: 320 lb (145.2 kg)  · Admission Body Wt: 320 lb (145.2 kg)  · Ideal Body Wt: 166 lb (75.3 kg), % Ideal Body 193%  · BMI Classification: BMI > or equal to 40.0 Obese Class III    Nutrition Interventions:   Continue NPO, Continue current Tube Feeding  Continued Inpatient Monitoring    Nutrition Evaluation:   · Evaluation: Progressing toward goals   · Goals: Enteral feeding will meet greater than 85% of estimated nutrition needs   · Monitoring: Weight, Pertinent Labs, Monitor Bowel Function, I&O, Skin Integrity, TF Tolerance, TF Intake, Chewing/Swallowing      Some areas of assessment may be incomplete due to standard COVID-19 Precautions. Anita Hernandez R.D., L.D.   Phone: [phone]

## 2020-05-15 NOTE — PLAN OF CARE
Problem: Falls - Risk of:  Goal: Will remain free from falls  Description: Will remain free from falls  Outcome: Ongoing     Problem:  Activity:  Goal: Ability to tolerate increased activity will improve  Description: Ability to tolerate increased activity will improve  Outcome: Ongoing     Problem: Skin Integrity:  Goal: Will show no infection signs and symptoms  Description: Will show no infection signs and symptoms  Outcome: Ongoing     Problem: Nutrition  Goal: Optimal nutrition therapy  Outcome: Ongoing     Problem: Pain:  Goal: Pain level will decrease  Description: Pain level will decrease  Outcome: Ongoing

## 2020-05-15 NOTE — PROGRESS NOTES
recommendations: [] Inpatient Rehab   [] [place]   [] Outpatient Therapy  [] Follow up at trauma clinic   [] Other:       Treatment completed by: Norman Wilkinson A.CCC/SLP

## 2020-05-16 LAB — GLUCOSE BLD-MCNC: 110 MG/DL (ref 75–110)

## 2020-05-16 PROCEDURE — 6370000000 HC RX 637 (ALT 250 FOR IP): Performed by: PSYCHIATRY & NEUROLOGY

## 2020-05-16 PROCEDURE — 92507 TX SP LANG VOICE COMM INDIV: CPT

## 2020-05-16 PROCEDURE — 6370000000 HC RX 637 (ALT 250 FOR IP): Performed by: PHYSICAL MEDICINE & REHABILITATION

## 2020-05-16 PROCEDURE — 97110 THERAPEUTIC EXERCISES: CPT

## 2020-05-16 PROCEDURE — 1180000000 HC REHAB R&B

## 2020-05-16 PROCEDURE — 97112 NEUROMUSCULAR REEDUCATION: CPT

## 2020-05-16 PROCEDURE — 99231 SBSQ HOSP IP/OBS SF/LOW 25: CPT | Performed by: PHYSICAL MEDICINE & REHABILITATION

## 2020-05-16 PROCEDURE — 99232 SBSQ HOSP IP/OBS MODERATE 35: CPT | Performed by: INTERNAL MEDICINE

## 2020-05-16 PROCEDURE — 6370000000 HC RX 637 (ALT 250 FOR IP): Performed by: INTERNAL MEDICINE

## 2020-05-16 PROCEDURE — 82947 ASSAY GLUCOSE BLOOD QUANT: CPT

## 2020-05-16 PROCEDURE — 6370000000 HC RX 637 (ALT 250 FOR IP): Performed by: STUDENT IN AN ORGANIZED HEALTH CARE EDUCATION/TRAINING PROGRAM

## 2020-05-16 PROCEDURE — 97530 THERAPEUTIC ACTIVITIES: CPT

## 2020-05-16 PROCEDURE — 97535 SELF CARE MNGMENT TRAINING: CPT

## 2020-05-16 RX ORDER — PROMETHAZINE HYDROCHLORIDE 12.5 MG/1
12.5 TABLET ORAL EVERY 6 HOURS PRN
Status: DISCONTINUED | OUTPATIENT
Start: 2020-05-16 | End: 2020-05-23 | Stop reason: HOSPADM

## 2020-05-16 RX ADMIN — TRAZODONE HYDROCHLORIDE 50 MG: 50 TABLET ORAL at 20:05

## 2020-05-16 RX ADMIN — PROMETHAZINE HYDROCHLORIDE 12.5 MG: 12.5 TABLET ORAL at 11:18

## 2020-05-16 RX ADMIN — ATORVASTATIN CALCIUM 40 MG: 40 TABLET, FILM COATED ORAL at 20:06

## 2020-05-16 RX ADMIN — ASPIRIN 81 MG 81 MG: 81 TABLET ORAL at 11:17

## 2020-05-16 RX ADMIN — ANTI-FUNGAL POWDER MICONAZOLE NITRATE TALC FREE: 1.42 POWDER TOPICAL at 20:07

## 2020-05-16 RX ADMIN — APIXABAN 5 MG: 5 TABLET, FILM COATED ORAL at 11:17

## 2020-05-16 RX ADMIN — ANTI-FUNGAL POWDER MICONAZOLE NITRATE TALC FREE: 1.42 POWDER TOPICAL at 11:19

## 2020-05-16 RX ADMIN — ACETAMINOPHEN 1000 MG: 500 TABLET ORAL at 14:00

## 2020-05-16 RX ADMIN — ESOMEPRAZOLE MAGNESIUM 40 MG: 40 GRANULE, DELAYED RELEASE ORAL at 11:17

## 2020-05-16 RX ADMIN — ACETAMINOPHEN 1000 MG: 500 TABLET ORAL at 06:01

## 2020-05-16 RX ADMIN — PROMETHAZINE HYDROCHLORIDE 12.5 MG: 12.5 TABLET ORAL at 20:06

## 2020-05-16 RX ADMIN — ACETAMINOPHEN 1000 MG: 500 TABLET ORAL at 20:05

## 2020-05-16 RX ADMIN — APIXABAN 5 MG: 5 TABLET, FILM COATED ORAL at 20:06

## 2020-05-16 RX ADMIN — SULFAMETHOXAZOLE AND TRIMETHOPRIM 1 TABLET: 800; 160 TABLET ORAL at 11:17

## 2020-05-16 RX ADMIN — SULFAMETHOXAZOLE AND TRIMETHOPRIM 1 TABLET: 800; 160 TABLET ORAL at 20:05

## 2020-05-16 ASSESSMENT — PAIN SCALES - WONG BAKER
WONGBAKER_NUMERICALRESPONSE: 0
WONGBAKER_NUMERICALRESPONSE: 0

## 2020-05-16 ASSESSMENT — PAIN DESCRIPTION - PROGRESSION

## 2020-05-16 ASSESSMENT — PAIN SCALES - GENERAL
PAINLEVEL_OUTOF10: 3
PAINLEVEL_OUTOF10: 0
PAINLEVEL_OUTOF10: 0
PAINLEVEL_OUTOF10: 3
PAINLEVEL_OUTOF10: 4
PAINLEVEL_OUTOF10: 4

## 2020-05-16 ASSESSMENT — PAIN DESCRIPTION - PAIN TYPE: TYPE: ACUTE PAIN

## 2020-05-16 ASSESSMENT — PAIN DESCRIPTION - ORIENTATION
ORIENTATION: LEFT
ORIENTATION: LEFT

## 2020-05-16 ASSESSMENT — PAIN DESCRIPTION - DIRECTION: RADIATING_TOWARDS: HIP AND KNEE

## 2020-05-16 ASSESSMENT — PAIN DESCRIPTION - LOCATION
LOCATION: LEG
LOCATION: LEG

## 2020-05-16 ASSESSMENT — PAIN DESCRIPTION - DESCRIPTORS: DESCRIPTORS: PATIENT UNABLE TO DESCRIBE

## 2020-05-16 NOTE — PROGRESS NOTES
Outpatient Therapy  [] Follow up at trauma clinic   [] Other:       Treatment completed by: Juancarlos Lama A.CCC/SLP

## 2020-05-16 NOTE — PROGRESS NOTES
Physical Medicine & Rehabilitation  Progress Note      Subjective:      78year-old gentleman with R nondominant hemiparesis, aphasia, and dysphagia secondary to ischemic CVA.  Patient is well, but has had some minor complaints of dizziness this morning which has resolved. He also has some c/o continued nausea. ROS:  Denies fevers, chills, sweats. No chest pain, palpitations, lightheadedness. Denies coughing, wheezing or shortness of breath. Denies abdominal pain, nausea, diarrhea or constipation. No new areas of joint pain. Denies new areas of numbness or weakness. Denies new anxiety or depression issues. No new skin problems. Rehabilitation:   Progressing in therapies. PT:  Restrictions/Precautions: Fall Risk, Up as Tolerated(PEG tube)  Required Braces or Orthoses  Other: Abdominal Binder   Transfers  Sit to Stand: Minimal Assistance(Umang Echeverria; placement of R LE/UE)  Stand to sit: Minimal Assistance(Umang Stebrii; demo's good eccentric control. )  Bed to Chair: Dependent/Total(Umang Fraser)  Stand Pivot Transfers: Moderate Assistance, Maximum Assistance(x1 assist; increase assistance required with fatigue)  Squat Pivot Transfers: Maximum Assistance(to L; no return to safe hand placement cue)  Comment: Patient requiring increased assist this AM, use of umang stedy vs stand pivot for safety 2* increased pain. Patient difficulty WBing on LLE this date. Ambulation 1  Surface: level tile  Device: (walker Lift)  Other Apparatus: Wheelchair follow  Assistance: Moderate assistance, 2 Person assistance(+ CGA for brief)  Quality of Gait: tactile and vc's for weight shifting and advancing right LE while maintaining extension of right knee during stance phase, assist with right LE during swing phase at times, fatigued quickly  Gait Deviations: Slow Catherine, Decreased step length, Decreased step height(rear wheels on walker lift locked to maintain straight path)  Distance: 20'  Comments: No pushing noted today. 81 mg, 81 mg, PEG Tube, Daily  atorvastatin (LIPITOR) tablet 40 mg, 40 mg, PEG Tube, Nightly  polyethylene glycol (GLYCOLAX) packet 17 g, 17 g, Oral, Daily PRN      Impression/Plan:   Impaired ADLs, gait, and mobility due to:      1. Ischemic CVA with R hemiparesis: PT/OT  for gait, mobility, strengthening, endurance, ADLs, and self care. On atorvastatin, ASA. 2. Aphasia/dysphagia: speech treating. Severe expressive aphasia with apraxia. Tube feed via PEG - nocturnal and bolus. Monitor response to boluses. Has ondansetron for nausea. Has been on Nexium per IM. Will add prn phenergan for nausea. 3. Aspiration pneumonia: completed Augmentin 5/5. Has albuterol nebs prn  4. Hypernatremia: Improved. Adjusting free water flushes timing to address nocturia.   5. A Fib: resumed Eliquis 5/5 - continue dose per neuro. Neuro signed off - follow up in clinic 4-6 weeks.   6. L leg pain/cellulitis: Improving. On Bactrim for cellulitis - scheduled through 5/18/2020. On scheduled Tylenol TID with improving aching L hip/thigh pain. ice routinely. 7. Insomnia: better onTrazodone at hs.   8. Bowel management: on miralax daily prn. Soft stools secondary to tube feed  9. HTN: stable. On home dose lisinopril 10 mg.   10. Internal medicine for medical management  11. DVT prophylaxis:  On eliquis    Electronically signed by Kim Boo MD on 5/16/2020 at 9:22 AM      This note is created with the assistance of a speech recognition program.  While intending to generate a document that actually reflects the content of the visit, the document can still have some errors including those of syntax and sound a like substitutions which may escape proof reading. In such instances, actual meaning can be extrapolated by contextual diversion.

## 2020-05-16 NOTE — PLAN OF CARE
Problem: Falls - Risk of:  Goal: Will remain free from falls  Description: Will remain free from falls  5/16/2020 0309 by Price Bills RN  Outcome: Ongoing  Note: Pt remained absent from falls. Call light within reach. Bed locked and in lowest position. Problem: Falls - Risk of:  Goal: Absence of physical injury  Description: Absence of physical injury  5/16/2020 0309 by Price Bills RN  Outcome: Ongoing  Note: Patient remains free of injury. Safe environment maintained      Problem: Activity:  Goal: Ability to tolerate increased activity will improve  Description: Ability to tolerate increased activity will improve  5/16/2020 0309 by Price Bills RN  Outcome: Ongoing  Note: Activity level improving      Problem: Skin Integrity:  Goal: Will show no infection signs and symptoms  Description: Will show no infection signs and symptoms  Outcome: Ongoing  Note: Pt skin integrity remained intact, no new alterations noted. Head to toe completed, see chart assessment. Problem: Skin Integrity:  Goal: Absence of new skin breakdown  Description: Absence of new skin breakdown  5/16/2020 0309 by Price Bills RN  Outcome: Ongoing  Note: Pt skin integrity remained intact, no new alterations noted. Head to toe completed, see chart assessment. Problem: Nutrition  Goal: Optimal nutrition therapy  5/16/2020 0309 by Price Bills RN  Outcome: Ongoing  Note: Patient NPO. Tolerating tube feedings      Problem: Pain:  Goal: Pain level will decrease  Description: Pain level will decrease  Outcome: Ongoing  Note: Patient denies pain at this time. Will continue to monitor      Problem: Pain:  Goal: Control of acute pain  Description: Control of acute pain  Outcome: Ongoing  Note: Patient denies pain at this time. Will continue to monitor      Problem: Pain:  Goal: Control of chronic pain  Description: Control of chronic pain  Outcome: Ongoing  Note: Patient denies pain at this time.  Will continue to monitor

## 2020-05-16 NOTE — PROGRESS NOTES
History:     Tobacco:    has no history on file for tobacco.  Alcohol:      has no history on file for alcohol. Drug Use:  has no history on file for drug. Family History:     No family history on file. Review of Systems:     Positive and Negative as described in HPI. Constitutional: Negative for chills, fatigue, fever and unexpected weight change. HENT: Negative for ear discharge, facial swelling, nosebleeds, sore throat, trouble swallowing and voice change. Eyes: Negative for redness and visual disturbance. Respiratory: Negative for cough, shortness of breath and wheezing. Cardiovascular: Negative for chest pain, palpitations and leg swelling. Gastrointestinal: Negative for abdominal pain, blood in stool, diarrhea and vomiting. Genitourinary: Negative for, dysuria and flank pain. Chronic urinary incontinence  Musculoskeletal: Negative for joint swelling. Skin: Left leg pain, increased redness left lower leg in addition to bilateral stasis dermatitis. Neurological: Right-sided weakness  Hematological: Negative for adenopathy. Does not bruise/bleed easily. Physical Exam:     BP (!) 96/45   Pulse 63   Temp 97.3 °F (36.3 °C) (Oral)   Resp 19   Ht 5' 10\" (1.778 m)   Wt (!) 320 lb (145.2 kg)   SpO2 97%   BMI 45.92 kg/m²   Temp (24hrs), Av.3 °F (36.3 °C), Min:97.3 °F (36.3 °C), Max:97.3 °F (36.3 °C)      General appearance:  alert, cooperative and no distress, obese, slurred speech  Eyes: Anicteric sclera. Pupils are equally round and reactive to light. Extraocular movements are intact.   Lungs:  clear to auscultation bilaterally, normal effort  Heart:  regular rate and rhythm, no murmur  Abdomen:  soft, nontender, nondistended, normal bowel sounds, no masses, hepatomegaly, splenomegaly, PEG tube in situ  Extremities:  no edema, redness, tenderness in the calves  Skin:  no gross lesions, rashes, induration  Neuro:  Alert, oriented X 3, Broca's aphasia, right hemiparesis    Investigations:      Laboratory Testing:  Lab Results   Component Value Date    WBC 11.2 (H) 05/11/2020    HGB 13.3 (L) 05/11/2020    HCT 40.2 (L) 05/11/2020    MCV 87.2 05/11/2020     05/11/2020     Lab Results   Component Value Date     05/11/2020    K 4.6 05/11/2020     05/11/2020    CO2 25 05/11/2020    BUN 36 05/11/2020    CREATININE 1.00 05/11/2020    GLUCOSE 170 05/11/2020    CALCIUM 8.8 05/11/2020         Assessment :      Primary Problem  <principal problem not specified>    Active Hospital Problems    Diagnosis Date Noted    Hemiplegia and hemiparesis following cerebral infarction affecting right non-dominant side ([address] [I69.353]     Aphasia due to recent cerebrovascular accident [I69.320]     Acute CVA (cerebrovascular accident) ([address] [I63.9] 05/03/2020    Atrial fibrillation ([facility] [I48.91] 04/30/2020    Essential hypertension [I10] 04/30/2020    Dysphagia due to recent cerebral infarction [I69.391] 04/30/2020    Acute ischemic left MCA stroke ([facility] [Q82.870]        Plan:     1. Right-sided weakness, Broca's aphasia, had acute left MCA infarct status post mechanical thrombectomy, on aspirin, Lipitor  2. Atrial fibrillation, on Eliquis 5 mg twice a day  3. Dysphagia, status post PEG tube placement  4. Hypertension, Controlled . 5. Aspiration pneumonia, completed antibiotics  6 . Tolerating Tube feed   7. Left leg cellulitis- solved with p.o. Bactrim. Doppler negative for DVT. Consultations:   IP CONSULT TO INTERNAL MEDICINE  IP CONSULT TO NEUROLOGY      Hillary Busby MD  5/16/2020  12:30 PM    Copy sent to Dr. Freddie Herrera MD    Please note that this chart was generated using voice recognition Dragon dictation software. Although every effort was made to ensure the accuracy of this automated transcription, some errors in transcription may have occurred.

## 2020-05-16 NOTE — PROGRESS NOTES
therapy after discharge;24 hour supervision or assist;Home with Home health PT    Goals  Short term goals  Time Frame for Short term goals: 10 days  Short term goal 1: Pt to perform bed mobility/rolling to left at mod A. Short term goal 2: Improve sitting dynamic balance to fair+  Short term goal 3: Pt to perform sit to stand at min/mod A from w/c/bed  Short term goal 4: Pt able to manuever w/c on level surfaces dist of 50 ft with L UE/L LE  Short term goal 5: Pt able to perform supine<>sit at mod A   Short term goal 6: Progress pt to pivot transfers on strong side with 2 person assist.  Long term goals  Time Frame for Long term goals : By DC  Long term goal 1: Pt able to perform bed mobility at min A   Long term goal 2: Pt able to perform sit to stand at min A    Long term goal 3: Pt able to perform transfers at min a (strong side)/mod a (weak side)  Long term goal 4: Pt able to  // bars for 5 to 10 minutes at min/mod A to perform pregait activiites  Long term goal 5: Progress pt to ambulation either in // bars or with appropriate assistive device dist of 20 to 30 ft, mod A, 2nd person CGA. Long term goal 6: Pt able to manuever w/c on level surface, dist of 150 ft, at supervsion level. Long term goal 7: Improve PASS score from 9/36 to 19/36 reduce fall risk.      Electronically signed by Kenzie Parker, PTA on 5/16/20 at 11:51 AM EDT     05/16/20 1150   PT Individual Minutes   Time In 0830   Time Out 1010   Minutes 100

## 2020-05-17 LAB — GLUCOSE BLD-MCNC: 90 MG/DL (ref 75–110)

## 2020-05-17 PROCEDURE — 82947 ASSAY GLUCOSE BLOOD QUANT: CPT

## 2020-05-17 PROCEDURE — 6370000000 HC RX 637 (ALT 250 FOR IP): Performed by: PSYCHIATRY & NEUROLOGY

## 2020-05-17 PROCEDURE — 6370000000 HC RX 637 (ALT 250 FOR IP): Performed by: INTERNAL MEDICINE

## 2020-05-17 PROCEDURE — 97535 SELF CARE MNGMENT TRAINING: CPT

## 2020-05-17 PROCEDURE — 97530 THERAPEUTIC ACTIVITIES: CPT

## 2020-05-17 PROCEDURE — 1180000000 HC REHAB R&B

## 2020-05-17 PROCEDURE — 99232 SBSQ HOSP IP/OBS MODERATE 35: CPT | Performed by: INTERNAL MEDICINE

## 2020-05-17 PROCEDURE — 6370000000 HC RX 637 (ALT 250 FOR IP): Performed by: STUDENT IN AN ORGANIZED HEALTH CARE EDUCATION/TRAINING PROGRAM

## 2020-05-17 PROCEDURE — 97110 THERAPEUTIC EXERCISES: CPT

## 2020-05-17 PROCEDURE — 97116 GAIT TRAINING THERAPY: CPT

## 2020-05-17 PROCEDURE — 6370000000 HC RX 637 (ALT 250 FOR IP): Performed by: PHYSICAL MEDICINE & REHABILITATION

## 2020-05-17 PROCEDURE — 97542 WHEELCHAIR MNGMENT TRAINING: CPT

## 2020-05-17 PROCEDURE — 97112 NEUROMUSCULAR REEDUCATION: CPT

## 2020-05-17 RX ADMIN — ACETAMINOPHEN 1000 MG: 500 TABLET ORAL at 06:01

## 2020-05-17 RX ADMIN — ANTI-FUNGAL POWDER MICONAZOLE NITRATE TALC FREE: 1.42 POWDER TOPICAL at 21:49

## 2020-05-17 RX ADMIN — ACETAMINOPHEN 1000 MG: 500 TABLET ORAL at 21:48

## 2020-05-17 RX ADMIN — PROMETHAZINE HYDROCHLORIDE 12.5 MG: 12.5 TABLET ORAL at 06:01

## 2020-05-17 RX ADMIN — PROMETHAZINE HYDROCHLORIDE 12.5 MG: 12.5 TABLET ORAL at 13:26

## 2020-05-17 RX ADMIN — APIXABAN 5 MG: 5 TABLET, FILM COATED ORAL at 08:37

## 2020-05-17 RX ADMIN — ESOMEPRAZOLE MAGNESIUM 40 MG: 40 GRANULE, DELAYED RELEASE ORAL at 08:39

## 2020-05-17 RX ADMIN — SULFAMETHOXAZOLE AND TRIMETHOPRIM 1 TABLET: 800; 160 TABLET ORAL at 08:37

## 2020-05-17 RX ADMIN — APIXABAN 5 MG: 5 TABLET, FILM COATED ORAL at 21:48

## 2020-05-17 RX ADMIN — ATORVASTATIN CALCIUM 40 MG: 40 TABLET, FILM COATED ORAL at 21:48

## 2020-05-17 RX ADMIN — ANTI-FUNGAL POWDER MICONAZOLE NITRATE TALC FREE: 1.42 POWDER TOPICAL at 08:37

## 2020-05-17 RX ADMIN — ACETAMINOPHEN 1000 MG: 500 TABLET ORAL at 13:15

## 2020-05-17 RX ADMIN — TRAZODONE HYDROCHLORIDE 50 MG: 50 TABLET ORAL at 21:48

## 2020-05-17 RX ADMIN — SULFAMETHOXAZOLE AND TRIMETHOPRIM 1 TABLET: 800; 160 TABLET ORAL at 21:48

## 2020-05-17 RX ADMIN — LISINOPRIL 20 MG: 20 TABLET ORAL at 08:37

## 2020-05-17 RX ADMIN — ASPIRIN 81 MG 81 MG: 81 TABLET ORAL at 08:37

## 2020-05-17 ASSESSMENT — PAIN DESCRIPTION - LOCATION
LOCATION: LEG
LOCATION: LEG

## 2020-05-17 ASSESSMENT — PAIN DESCRIPTION - ORIENTATION
ORIENTATION: LEFT
ORIENTATION: LEFT

## 2020-05-17 ASSESSMENT — PAIN DESCRIPTION - PAIN TYPE
TYPE: ACUTE PAIN
TYPE: ACUTE PAIN

## 2020-05-17 ASSESSMENT — PAIN SCALES - GENERAL
PAINLEVEL_OUTOF10: 4
PAINLEVEL_OUTOF10: 3
PAINLEVEL_OUTOF10: 4
PAINLEVEL_OUTOF10: 4

## 2020-05-17 NOTE — PROGRESS NOTES
Route: Gastrostomy  · Formula: [place] [place] Peptide 1.5 [place])  · Rate (ml/hr):90 mL/hr from 8 pm to 6 am; Bolus 180 mL 3x/day    · Volume (ml/day): 1440 mL/day  · Duration: Continuous, Bolus  · Water Flushes: 200 mL x 4 daily; 30 mL before and after each bolus feeding  · Current TF & Flush Orders Provides: 2160 kcal, 106 gm of protein, 1959 mL of free water   · Anthropometric Measures:  · Ht: 5' 10\" (177.8 cm)   · Current Body Wt: 320 lb (145.2 kg)  · Admission Body Wt: 320 lb (145.2 kg)  · Ideal Body Wt: 166 lb (75.3 kg), % Ideal Body 193%  · BMI Classification: BMI > or equal to 40.0 Obese Class III    Nutrition Interventions:   Continue NPO, Modify current Tube Feeding  Continued Inpatient Monitoring    Nutrition Evaluation:   · Evaluation: Progressing toward goals   · Goals: Enteral feeding will meet greater than 85% of estimated nutrition needs   · Monitoring: Weight, Pertinent Labs, Monitor Bowel Function, Chewing/Swallowing, TF Intake, TF Tolerance, Nausea or Vomiting, Skin Integrity, I&O      Some areas of assessment maybe incomplete due to COVID-19 precautions.     Montell Rinne MFN, R.D, L.D,  Clinical Dietitian  Office # [phone]

## 2020-05-17 NOTE — PROGRESS NOTES
Patient continuing to complain of left leg pain. Dr. Sharon Espinosa notified when rounding. New order received for PVR.

## 2020-05-17 NOTE — PROGRESS NOTES
[address]    INPATIENT OCCUPATIONAL THERAPY  PROGRESS NOTE  Date: 2020  Patient Name: Onur Laguna      Room: 2681/1622-58  MRN: [id number]    : 1950  (78 y.o.) Gender: male       Referring Practitioner: Dr. Phil Zuniga  Diagnosis: L MCA ischemic stroke, s/p emergent mechanical thrombectomy 20  General  Chart Reviewed: Yes, Progress Notes  Patient assessed for rehabilitation services?: Yes  Response to previous treatment: Patient with no complaints from previous session  Family / Caregiver Present: No  Referring Practitioner: Dr. Phil Zuniga  Diagnosis: L MCA ischemic stroke, s/p emergent mechanical thrombectomy 20    Restrictions  Restrictions/Precautions: Fall Risk, Up as Tolerated(PEG tube)  Required Braces or Orthoses  Other: Abdominal Binder  Required Braces or Orthoses?: Yes  Equipment Used: Bed, Wheelchair      Subjective  Subjective: AM: pt indicates pain level is 4 this AM c his fingers. PM: pt indicates using communication paper that he needs to have a BM. Comments: Pt clearly verbalized yes/no during treatment session in AM.  Patient Currently in Pain: Yes  Pain Level: 4  Pain Location: Leg  Pain Orientation: Left  Overall Orientation Status: Within Functional Limits  Patient Observation  Observations: pt indicates that he feels dizzy after sup>sit transfer, improves after 1-2 min  Pain Assessment  Pain Assessment: 0-10  Pain Level: 4  Pain Type: Acute pain  Pain Location: Leg  Pain Orientation: Left  Pain Radiating Towards: hp>knee  Response to Pain Intervention: Patient Satisfied    Objective  Cognition  Overall Cognitive Status: Impaired  Awareness of Errors: Assistance required to identify errors made  Sequencing and Organization: Assistance required to identify errors made;Assistance required to generate solutions  Bed mobility  Rolling to Left: Minimal assistance  Supine to Sit: Moderate assistance  Scooting:  Moderate assistance(for R hip at EOB)  Comment: increased assist c scooting this AM 2* increased assist c scooting this AM 2* fatigue  Balance  Sitting Balance: Stand by assistance(Occasional CGA when reaching forward)  Standing Balance: Minimal assistance(fluctuates SBA>Min A depending on task/level of support)  Standing Balance  Time: AM: 1-2 min x 4; PM 2-3 min c 5  Activity: AM ADLs PM: donning shorts wt bearing,  Comment: 0-1 UE support when standing in umang [facility], no LOB noted, writer supported RUE for joint protection  Functional Mobility  Functional - Mobility Device: Wheelchair  Activity: To/from bathroom  Assist Level: Dependent/Total   ADL  Equipment Provided: Reacher;Long-handled sponge;Dressing stick  Feeding: NPO(PEG tube)  Grooming: Stand by assistance;Verbal cueing  UE Bathing: Minimal assistance  LE Bathing: Moderate assistance  UE Dressing: Minimal assistance(A to thread RUE, cues for elba-technique)  LE Dressing: Maximum assistance(pt utilizes reacher to tread LUE, TA for all other parts)  Toileting: Maximum assistance(TA for buttocks hygiene after BM while standing in [facility])  Additional Comments: bathing/grooming comp at sink at [facility] level this date, pt utilizes dressing stick to doff, TA to ira socks. pt utilizes LH sponge to wash BLE below knees and reacher to dry,  Pt req TA to thread RLE and partial A to thread LLE c use of reacher. Pt stood c Rex Rush and attempted to pulled shorts up over L hip but was unable 2* fatigue this AM.     Transfers  Sit to stand: Minimal assistance;(from bed and toilet, utilizes umang lora)  Stand to sit: Minimal assistance(protect RUE)  Transfer Comments: writer assisted c RUE protection;  Rex Rush used for all transfers this date  Toilet Transfers  Equipment Used: Raised toilet seat with rails  Toilet Transfer: Minimal assistance(min A for sit>stand transfer, writer supports RUE for protection)  Toilet Transfers Comments: Rex Rush  Wheelchair [employer]

## 2020-05-17 NOTE — PROGRESS NOTES
with advancement of R LE. Cues to increased R LE step length and upright posture. Poor quad control to R knee. Gait Deviations: Decreased step length;Decreased step height  Distance: 30, 35 ft(1 standing break given with 30 ft.)  Comments: Eager to perform and works hard. Stairs/Curb  Stairs?: No              Wheelchair Activities  Propulsion: Yes  Propulsion 1  Propulsion: Manual  Level: Level Tile  Method: LUE;LLE  Level of Assistance: Minimal assistance(10 % of trial for redirection to left)  Description/ Details: pt instructed in visual tracking right, straight path 1 right turn   Distance: 100 ft           Neuromuscular Education  Neuromuscular education: Yes  NDT Treatment: Upper extremity; Lower extremity; Sitting;Standing  BALANCE Posture: Fair  Sitting - Static: Fair;+(EOB single UE support)    EXERCISES    Other exercises?: Yes  Other exercises 1: seated reciprical patteran left AROM , right AAROM   Other exercises 2: UBE 25 min fwd/retro right hand strap , tactile cues right shoulder   Other exercises 3: sit to stand sarasteady CGA , assist right UE and lateral LE   Other exercises 4: sit to stand parallel bars left UE assist parallel bars min assist right knee blocked   Other exercises 5: static stand left UE support parallel bars performing right weight shift with static hold(right knee blocked for safety)  Other exercises 7: FWD and Retro amb. in // bars working on wt. shifting 10 ft twice.   Mod x 1  2nd person CGA           Activity Tolerance: Patient limited by fatigue  PT Equipment Recommendations  Equipment Needed: Yes(TBD)       Current Treatment Recommendations: Strengthening, Functional Mobility Training, Transfer Training, Gait Training, Safety Education & Training, Balance Training, Endurance Training, Wheelchair Mobility Training, Patient/Caregiver Education & Training, Equipment Evaluation, Education, & procurement, Modalities    Conditions Requiring Skilled Therapeutic Intervention  Body structures, Functions, Activity limitations: Decreased functional mobility ; Decreased strength;Decreased endurance;Decreased sensation;Decreased balance  Treatment Diagnosis: Impaired function. Prognosis: Good  Decision Making: Medium Complexity  Barriers to Learning: Aphasic  REQUIRES PT FOLLOW UP: Yes  Discharge Recommendations: Patient would benefit from continued therapy after discharge;24 hour supervision or assist;Home with Home health PT    Goals  Short term goals  Time Frame for Short term goals: 10 days  Short term goal 1: Pt to perform bed mobility/rolling to left at mod A. Short term goal 2: Improve sitting dynamic balance to fair+  Short term goal 3: Pt to perform sit to stand at min/mod A from w/c/bed  Short term goal 4: Pt able to manuever w/c on level surfaces dist of 50 ft with L UE/L LE  Short term goal 5: Pt able to perform supine<>sit at mod A   Short term goal 6: Progress pt to pivot transfers on strong side with 2 person assist.  Long term goals  Time Frame for Long term goals : By DC  Long term goal 1: Pt able to perform bed mobility at min A   Long term goal 2: Pt able to perform sit to stand at min A    Long term goal 3: Pt able to perform transfers at min a (strong side)/mod a (weak side)  Long term goal 4: Pt able to  // bars for 5 to 10 minutes at min/mod A to perform pregait activiites  Long term goal 5: Progress pt to ambulation either in // bars or with appropriate assistive device dist of 20 to 30 ft, mod A, 2nd person CGA. Long term goal 6: Pt able to manuever w/c on level surface, dist of 150 ft, at supervsion level. Long term goal 7: Improve PASS score from 9/36 to 19/36 reduce fall risk.         05/17/20 0900 05/17/20 1430   PT Individual Minutes   Time In 0900 1430   Time Out [phone]   Minutes 50 45       Electronically signed by Max Birmingham PTA on 5/17/20 at 4:36 PM EDT

## 2020-05-18 LAB
ALBUMIN SERPL-MCNC: 3.6 G/DL (ref 3.5–5.2)
ANION GAP SERPL CALCULATED.3IONS-SCNC: 12 MMOL/L (ref 9–17)
BUN BLDV-MCNC: 59 MG/DL (ref 8–23)
BUN/CREAT BLD: ABNORMAL (ref 9–20)
CALCIUM SERPL-MCNC: 8.9 MG/DL (ref 8.6–10.4)
CHLORIDE BLD-SCNC: 101 MMOL/L (ref 98–107)
CO2: 22 MMOL/L (ref 20–31)
CREAT SERPL-MCNC: 1.57 MG/DL (ref 0.7–1.2)
GFR AFRICAN AMERICAN: 54 ML/MIN
GFR NON-AFRICAN AMERICAN: 44 ML/MIN
GFR SERPL CREATININE-BSD FRML MDRD: ABNORMAL ML/MIN/{1.73_M2}
GFR SERPL CREATININE-BSD FRML MDRD: ABNORMAL ML/MIN/{1.73_M2}
GLUCOSE BLD-MCNC: 104 MG/DL (ref 75–110)
GLUCOSE BLD-MCNC: 105 MG/DL (ref 70–99)
HCT VFR BLD CALC: 36.2 % (ref 41–53)
HEMOGLOBIN: 12.1 G/DL (ref 13.5–17.5)
MCH RBC QN AUTO: 29 PG (ref 26–34)
MCHC RBC AUTO-ENTMCNC: 33.5 G/DL (ref 31–37)
MCV RBC AUTO: 86.5 FL (ref 80–100)
NRBC AUTOMATED: ABNORMAL PER 100 WBC
PDW BLD-RTO: 16.3 % (ref 11.5–14.9)
PLATELET # BLD: 203 K/UL (ref 150–450)
PMV BLD AUTO: 10.8 FL (ref 6–12)
POTASSIUM SERPL-SCNC: 4.9 MMOL/L (ref 3.7–5.3)
RBC # BLD: 4.18 M/UL (ref 4.5–5.9)
SODIUM BLD-SCNC: 135 MMOL/L (ref 135–144)
WBC # BLD: 8.4 K/UL (ref 3.5–11)

## 2020-05-18 PROCEDURE — 36415 COLL VENOUS BLD VENIPUNCTURE: CPT

## 2020-05-18 PROCEDURE — 6370000000 HC RX 637 (ALT 250 FOR IP): Performed by: STUDENT IN AN ORGANIZED HEALTH CARE EDUCATION/TRAINING PROGRAM

## 2020-05-18 PROCEDURE — 97110 THERAPEUTIC EXERCISES: CPT

## 2020-05-18 PROCEDURE — 6370000000 HC RX 637 (ALT 250 FOR IP): Performed by: PHYSICAL MEDICINE & REHABILITATION

## 2020-05-18 PROCEDURE — 82040 ASSAY OF SERUM ALBUMIN: CPT

## 2020-05-18 PROCEDURE — 97112 NEUROMUSCULAR REEDUCATION: CPT

## 2020-05-18 PROCEDURE — 97542 WHEELCHAIR MNGMENT TRAINING: CPT

## 2020-05-18 PROCEDURE — 82947 ASSAY GLUCOSE BLOOD QUANT: CPT

## 2020-05-18 PROCEDURE — 99232 SBSQ HOSP IP/OBS MODERATE 35: CPT | Performed by: PHYSICAL MEDICINE & REHABILITATION

## 2020-05-18 PROCEDURE — 6370000000 HC RX 637 (ALT 250 FOR IP): Performed by: PSYCHIATRY & NEUROLOGY

## 2020-05-18 PROCEDURE — 97530 THERAPEUTIC ACTIVITIES: CPT

## 2020-05-18 PROCEDURE — 97535 SELF CARE MNGMENT TRAINING: CPT

## 2020-05-18 PROCEDURE — 80048 BASIC METABOLIC PNL TOTAL CA: CPT

## 2020-05-18 PROCEDURE — 93923 UPR/LXTR ART STDY 3+ LVLS: CPT

## 2020-05-18 PROCEDURE — 2580000003 HC RX 258: Performed by: INTERNAL MEDICINE

## 2020-05-18 PROCEDURE — 99232 SBSQ HOSP IP/OBS MODERATE 35: CPT | Performed by: INTERNAL MEDICINE

## 2020-05-18 PROCEDURE — 92526 ORAL FUNCTION THERAPY: CPT

## 2020-05-18 PROCEDURE — 92507 TX SP LANG VOICE COMM INDIV: CPT

## 2020-05-18 PROCEDURE — 85027 COMPLETE CBC AUTOMATED: CPT

## 2020-05-18 PROCEDURE — 1180000000 HC REHAB R&B

## 2020-05-18 PROCEDURE — 97116 GAIT TRAINING THERAPY: CPT

## 2020-05-18 PROCEDURE — 6370000000 HC RX 637 (ALT 250 FOR IP): Performed by: INTERNAL MEDICINE

## 2020-05-18 RX ORDER — SODIUM CHLORIDE 9 MG/ML
INJECTION, SOLUTION INTRAVENOUS CONTINUOUS
Status: DISCONTINUED | OUTPATIENT
Start: 2020-05-18 | End: 2020-05-20

## 2020-05-18 RX ADMIN — APIXABAN 5 MG: 5 TABLET, FILM COATED ORAL at 07:25

## 2020-05-18 RX ADMIN — ANTI-FUNGAL POWDER MICONAZOLE NITRATE TALC FREE: 1.42 POWDER TOPICAL at 07:26

## 2020-05-18 RX ADMIN — ASPIRIN 81 MG 81 MG: 81 TABLET ORAL at 07:25

## 2020-05-18 RX ADMIN — ACETAMINOPHEN 1000 MG: 500 TABLET ORAL at 21:01

## 2020-05-18 RX ADMIN — SULFAMETHOXAZOLE AND TRIMETHOPRIM 1 TABLET: 800; 160 TABLET ORAL at 07:25

## 2020-05-18 RX ADMIN — SODIUM CHLORIDE: 9 INJECTION, SOLUTION INTRAVENOUS at 12:58

## 2020-05-18 RX ADMIN — APIXABAN 5 MG: 5 TABLET, FILM COATED ORAL at 20:44

## 2020-05-18 RX ADMIN — ESOMEPRAZOLE MAGNESIUM 40 MG: 40 GRANULE, DELAYED RELEASE ORAL at 09:17

## 2020-05-18 RX ADMIN — ACETAMINOPHEN 1000 MG: 500 TABLET ORAL at 13:14

## 2020-05-18 RX ADMIN — ACETAMINOPHEN 1000 MG: 500 TABLET ORAL at 05:48

## 2020-05-18 RX ADMIN — ANTI-FUNGAL POWDER MICONAZOLE NITRATE TALC FREE: 1.42 POWDER TOPICAL at 21:00

## 2020-05-18 RX ADMIN — TRAZODONE HYDROCHLORIDE 50 MG: 50 TABLET ORAL at 20:44

## 2020-05-18 RX ADMIN — ATORVASTATIN CALCIUM 40 MG: 40 TABLET, FILM COATED ORAL at 20:44

## 2020-05-18 ASSESSMENT — PAIN DESCRIPTION - DESCRIPTORS: DESCRIPTORS: PATIENT UNABLE TO DESCRIBE

## 2020-05-18 ASSESSMENT — PAIN DESCRIPTION - PROGRESSION: CLINICAL_PROGRESSION: NOT CHANGED

## 2020-05-18 ASSESSMENT — PAIN SCALES - GENERAL
PAINLEVEL_OUTOF10: 4
PAINLEVEL_OUTOF10: 5
PAINLEVEL_OUTOF10: 3
PAINLEVEL_OUTOF10: 5

## 2020-05-18 ASSESSMENT — PAIN DESCRIPTION - LOCATION: LOCATION: LEG

## 2020-05-18 ASSESSMENT — PAIN DESCRIPTION - DIRECTION: RADIATING_TOWARDS: FROM HIP TO KNEE

## 2020-05-18 ASSESSMENT — PAIN DESCRIPTION - FREQUENCY: FREQUENCY: INTERMITTENT

## 2020-05-18 ASSESSMENT — PAIN DESCRIPTION - PAIN TYPE: TYPE: ACUTE PAIN

## 2020-05-18 ASSESSMENT — PAIN DESCRIPTION - ORIENTATION: ORIENTATION: LEFT

## 2020-05-18 NOTE — PROGRESS NOTES
3/5.   SKIN: Warm dry and intact. Good turgor except distal BLE with chronic skin changes and dryness/hyperkeratosis - redness has improved.   EXTREMITIES:  No calf tenderness to palpation bilaterally. Adam's negative. Chronic stable edema distal BLEs. .    PSYCH: Mood WNL. Appropriately interactive. Affect WNL.     Diagnostics:     CBC:   Recent Labs     05/18/20  0722   WBC 8.4   RBC 4.18*   HGB 12.1*   HCT 36.2*   MCV 86.5   RDW 16.3*        BMP:   Recent Labs     05/18/20  0722      K 4.9      CO2 22   BUN 59*   CREATININE 1.57*   GLUCOSE 105*     BNP: No results for input(s): BNP in the last 72 hours. PT/INR: No results for input(s): PROTIME, INR in the last 72 hours. APTT: No results for input(s): APTT in the last 72 hours. CARDIAC ENZYMES: No results for input(s): CKMB, CKMBINDEX, TROPONINT in the last 72 hours. Invalid input(s): CKTOTAL;3  FASTING LIPID PANEL:  Lab Results   Component Value Date    CHOL 111 04/25/2020    HDL 46 04/25/2020    TRIG 80 04/25/2020     LIVER PROFILE: No results for input(s): AST, ALT, ALB, BILIDIR, BILITOT, ALKPHOS in the last 72 hours.      Current Medications:   Current Facility-Administered Medications: promethazine (PHENERGAN) tablet 12.5 mg, 12.5 mg, Oral, Q6H PRN  ondansetron (ZOFRAN) tablet 4 mg, 4 mg, Oral, Q6H PRN  acetaminophen (TYLENOL) tablet 1,000 mg, 1,000 mg, Oral, 3 times per day  miconazole (MICOTIN) 2 % powder, , Topical, BID  esomeprazole Magnesium (NEXIUM) 40 MG PACK 40 mg, 40 mg, Per J Tube, Daily  traZODone (DESYREL) tablet 50 mg, 50 mg, PEG Tube, Nightly  lisinopril (PRINIVIL;ZESTRIL) tablet 20 mg, 20 mg, PEG Tube, Daily  apixaban (ELIQUIS) tablet 5 mg, 5 mg, Oral, BID  albuterol (PROVENTIL) nebulizer solution 2.5 mg, 2.5 mg, Nebulization, As Directed RT PRN  aspirin chewable tablet 81 mg, 81 mg, PEG Tube, Daily  atorvastatin (LIPITOR) tablet 40 mg, 40 mg, PEG Tube, Nightly  polyethylene glycol (GLYCOLAX) packet 17 g, 17 g, Oral,

## 2020-05-18 NOTE — PATIENT CARE CONFERENCE
[address]   ACUTE REHABILITATION  TEAM CONFERENCE NOTE  Date: 20  Patient Name: Mazin Alfred       Room: 2922/4364-71  MRN: [id number]       : 1950  (78 y.o.)     Gender: male       Acute CVA (cerebrovascular accident) [facility]) [I63.9]  Diagnosis: L MCA ischemic stroke, s/p emergent mechanical thrombectomy 20     NURSING  Bladder  Continent  Bowel   Continent  Intervention    Both Bowel & Bladder Program     Wounds/Incisions/Ulcers: reddened groin and sacrum, zinc paste applied as needed  Medication Education Program: Patient currently unable to manage medications and family being educated  Pain: Patient's pain is currently controlled with -  scheduled tylenol    Fall Risk:  Falling star program initiated    PHYSICAL THERAPY  Bed mobility  Bridging: Minimal assistance(to support (R)LE)  Scooting: Maximal assistance(to EOB, bed aneudy used to assist)  Bed Mobility  Bridging: Minimal assistance(to support (R)LE)  Rolling: Moderate assistance;Rolling Left  Supine to Sit: Moderate assistance(assist with R UE/LE,vc's for technique)  Sit to Supine: Moderate assistance((B) LE assist.)  Scooting: Maximal assistance(to EOB, bed aneudy used to assist)  Comment: Bed mobility completed in hospital bed in room. Pt ed on proper technique. Ed to attempt to use his strong leg (L) to scoop up his (R) to assist with getting it up into bed. Pt still requires mod A at this time. Transfers:  Sit to Stand: Minimal Assistance(assist placement right UE )  Stand to sit: Minimal Assistance  Bed to Chair: Dependent/Total(Angélica Echeverria to return to bed.)    Ambulation 1  Surface: level tile  Device: (Green walker lift)  Other Apparatus: Wheelchair follow  Assistance: Moderate assistance;2 Person assistance;Maximum assistance  Quality of Gait: pt's RLE fatigues quickly, requires assist to take full step.  VC's to weight shift while advancing RLE, with fatigue pt pushes to his R  Gait Deviations: Decreased step length;Decreased step height  Distance: 55ft  Comments: Eager to perform and works hard. Wheelchair Activities  Propulsion: Yes  Propulsion 1  Propulsion: Manual  Level: Level Tile  Method: LUE;LLE  Level of Assistance: Minimal assistance(10 % of trial for redirection to left)  Description/ Details: straight path and two 90 degree turns made  Distance: 154ft     Reason if not Attempted: Not attempted due to medical condition or safety concerns  CARE Score: 88    Walk   Walk 10 Feet  Dependent  walker lift, 2 assist 1   Walk 50 feet with 2 Turns      88   Walk 150 Feet      88   Walking 10 feet Uneven Surface      88     Steps  1 Step (Curb)       88   4 Steps       88   12 Steps       88     Wheelchair Ability   Wheel 50 Feet - 2 turns  Partial/moderate assistance  vc's to avoid running into objects on the (R)  3  Wheel 150 Feet  Partial/moderate assistance    3    Equipment Needed: Yes(TBD)    Pt self-limiting today due to nausea, L LE    Goals  Time Frame for Short term goals: 10 days  Short term goal 1: Pt to perform bed mobility/rolling to left at mod A. Short term goal 2: Improve sitting dynamic balance to fair+  Short term goal 3: Pt to perform sit to stand at min/mod A from w/c/bed  Short term goal 4: Pt able to manuever w/c on level surfaces dist of 50 ft with L UE/L LE  Short term goal 5: Pt able to perform supine<>sit at mod A   Short term goal 6: Progress pt to pivot transfers on strong side with 2 person assist.    OCCUPATIONAL THERAPY  SELF CARE   Equipment Provided: Dressing stick, Long-handled sponge  Eating   88     NPO- PEG tube  NPO(PEG tube)   Oral Hygiene   5  Assistance Needed: Setup or clean-up assistance  Using green sponge for oral care   Setup(Washed face/hands, brushed hair, oral care w/sponge)   Shower/Bathe Self   3  Assistance Needed: Partial/moderate assistance  UB - Min A; LB - Mod A with AE   UE Bathing: Minimal assistance(A to wash LUE)  LE Bathing:  Moderate assistance(Pt Long term goals : By Discharge  Long term goal 1: Pt will complete eating/grooming/UB ADL's with set-up A and Good safety using AE/modified techniques as needed. Long term goal 2: Pt will complete LB ADL's/toileting with Min A and Good safety using AE/modified techniques as needed. Long term goal 3: Pt will complete functional transfers with Min-Mod A and Good safety using appropriate DME. Long term goal 4: Pt will demo IND with appropriate HEP for RUE ROM/strength/coordination. ST: diet at least restrictive consistency, mod A expression    Team Members Present at Conference:  :  Ras Ray  LSW  Occupational Therapist: Chocokarla Curry [address] PT  Speech Therapist: Cyrus EVANSCCC/SLP  Nurse: Brenton Matamoros RN    Recreation Therapy: [place]  Dietary/Nutrition: Alena Cortez RD LD    Pastoral Care: Carl Vicente  CMG:   Veronia Castleman RN     I approve the established interdisciplinary plan of care as documented within the medical record of [place] Ruggiero.     Cody Cárdenas MD

## 2020-05-18 NOTE — PROGRESS NOTES
[facility]    CONSULTATION / HISTORY AND PHYSICAL EXAMINATION            Date:   5/18/2020  Patient name:  Hunter Byrd  Date of admission:  5/3/2020 12:51 PM  MRN:   [id number]  Account:  [de-identified]  YOB: 1950  PCP:    Danis Carlton MD  Room:   [facility]2372-73  Code Status:    Full Code    Physician Requesting Consult: Bisi Max*    Reason for Consult: Medical management    Chief Complaint:     No chief complaint on file. Acute stroke  History Obtained From:     patient, electronic medical record    History of Present Illness/ Interval History:   Patient initially presented to [facility] with right-sided weakness, facial droop, aphasia. Underwent mechanical thrombectomy. PEG tube was placed for dysphagia.     tolerating tube feed  Reports left leg pain today    Past Medical History:     Past Medical History:   Diagnosis Date    Arterial ischemic stroke, MCA (middle cerebral artery), left, acute ([facility] [address] 04/25/2020    Atrial fibrillation (HCC)     Stroke [facility])         Past Surgical History:     Past Surgical History:   Procedure Laterality Date    GASTROSTOMY TUBE PLACEMENT  05/01/2020    EGD    UPPER GASTROINTESTINAL ENDOSCOPY N/A 5/1/2020    ** CASE IN OR W/ GI STAFF - EGD ESOPHAGOGASTRODUODENOSCOPY PEG TUBE INSERTION performed by Yossi Grady DO at [facility]        Medications Prior to Admission:     Prior to Admission medications    Medication Sig Start Date End Date Taking? Authorizing Provider   aspirin 81 MG chewable tablet 1 tablet by PEG Tube route daily 5/3/20   Amadeo Houser MD   atorvastatin (LIPITOR) 40 MG tablet 1 tablet by PEG Tube route nightly 5/3/20   Nguyen Han MD   lisinopril (PRINIVIL;ZESTRIL) 10 MG tablet Take 1 tablet by mouth daily 5/3/20   Amadeo Tomlinson MD        Allergies:     Patient has no known allergies.     Social History:     Tobacco:    has no history on file for tobacco.  Alcohol:      has no history on file for alcohol. Drug Use:  has no history on file for drug. Family History:     No family history on file. Review of Systems:     Positive and Negative as described in HPI. Constitutional: Negative for chills, fatigue, fever and unexpected weight change. HENT: Negative for ear discharge, facial swelling, nosebleeds, sore throat, trouble swallowing and voice change. Eyes: Negative for redness and visual disturbance. Respiratory: Negative for cough, shortness of breath and wheezing. Cardiovascular: Negative for chest pain, palpitations and leg swelling. Gastrointestinal: Negative for abdominal pain, blood in stool, diarrhea and vomiting. Genitourinary: Negative for, dysuria and flank pain. Chronic urinary incontinence  Musculoskeletal: Negative for joint swelling. Skin: Bilateral lymphedema, stasis dermatitis is present  Neurological: Right-sided weakness  Hematological: Negative for adenopathy. Does not bruise/bleed easily. Physical Exam:     BP 96/61   Pulse 60   Temp 97.5 °F (36.4 °C)   Resp 16   Ht 5' 10\" (1.778 m)   Wt (!) 320 lb (145.2 kg)   SpO2 98%   BMI 45.92 kg/m²   Temp (24hrs), Av °F (36.7 °C), Min:97.5 °F (36.4 °C), Max:98.4 °F (36.9 °C)      General appearance:  alert, cooperative and no distress, obese, slurred speech  Eyes: Anicteric sclera. Pupils are equally round and reactive to light. Extraocular movements are intact.   Lungs:  clear to auscultation bilaterally, normal effort  Heart:  regular rate and rhythm, no murmur  Abdomen:  soft, nontender, nondistended, normal bowel sounds, no masses, hepatomegaly, splenomegaly, PEG tube in situ  Extremities:  no edema, redness, tenderness in the calves  Skin:  no gross lesions, rashes, induration  Neuro:  Alert, oriented X 3, Broca's aphasia, right hemiparesis    Investigations:      Laboratory Testing:  Lab Results

## 2020-05-18 NOTE — CARE COORDINATION
Writer called the pt son. sherley perez,left a message on his vmail. Writer informed him [facility] denied him to go there.  Writer further stated that if he wants the pt to go to another facility to let him know

## 2020-05-18 NOTE — PROGRESS NOTES
[address] [facility]    ACUTE REHABILITATION OCCUPATIONAL THERAPY  DAILY NOTE    Date: 20  Patient Name: Alyssa Todd      Room: 8532/0701-74    MRN: [id number]   : 1950  (78 y.o.)  Gender: male   Referring Practitioner: Dr. Debby Caicedo  Diagnosis: L MCA ischemic stroke, s/p emergent mechanical thrombectomy 20    Restrictions  Restrictions/Precautions: Fall Risk, Up as Tolerated(PEG tube)  Required Braces or Orthoses  Other: Abdominal Binder  Required Braces or Orthoses?: Yes  Equipment Used: Bed, Wheelchair    Subjective  Subjective: Pt indicated that BP was low this morning (99/43) and requested that OT take it again (109/53). Comments: RN notified in AM; pt with new IV placed in PM  Patient Currently in Pain: Yes  Pain Level: 4  Pain Location: Leg  Pain Orientation: Left  Restrictions/Precautions: Fall Risk;Up as Tolerated(PEG tube)     Pain Assessment  Pain Assessment: 0-10  Pain Level: 4  Patient's Stated Pain Goal: No pain  Pain Type: Acute pain  Pain Location: Leg  Pain Orientation: Left  Pain Radiating Towards: From hip to knee  Pain Descriptors: Patient unable to describe  Pain Frequency: Intermittent  Clinical Progression: Not changed  Response to Pain Intervention: None  Multiple Pain Sites: No    Objective  Perception  Overall Perceptual Status: Impaired  Unilateral Attention: Cues to attend to right side of body(Improvement noted during UB ADL's)  Motor Planning: Hand over hand to sequence tasks(Improvement noted with use of AE)  Balance  Sitting Balance: Stand by assistance  Standing Balance: Contact guard assistance(in [place])  Bed mobility  Supine to Sit: Moderate assistance  Sit to Supine: Moderate assistance  Scooting: Minimal assistance(For R hip at EOB)  Transfers  Sit to stand: Minimal assistance; Moderate assistance  Stand to sit: Minimal assistance; Moderate assistance  Transfer Comments: Level of A varied based on height of surface and pt's reported fatigue; [facility] used for all transfers     Sjapper Used: Raised toilet seat with rails  Toilet Transfer: Dependent/Total  Toilet Transfers Comments: [facility]     Type of ROM/Therapeutic Exercise  Type of ROM/Therapeutic Exercise: PROM; Free weights  Comment: Pt completed exercises with 3# d-bell on L side x10-15 reps while OT completed PROM of RUE in an attempt to elicit muscle contractions. Trace contractions felt with shoulder ADDuction, internal rotation, and scapular retraction. Exercises  Scapular Protraction: x  Scapular Retraction: x  Shoulder Depression: x  Shoulder Elevation: x  Shoulder Flexion: x  Shoulder Extension: x  Shoulder ABduction: x  Shoulder ADduction: x  Elbow Flexion: x  Elbow Extension: x  Wrist Flexion: x  Wrist Extension: x  Other: Internal/external rotation  Weight Bearing  Weight Bearing Technique: Yes  RUE Weight Bearing: Forearm standing  Response To Weight Bearing Technique: OT provided support at elbow while standing/sitting in [facility] and completing dynamic tasks with LUE    ADL  Equipment Provided: Dressing stick;Long-handled sponge  Feeding: NPO(PEG tube)  Grooming: Setup(Washed face/hands, brushed hair, oral care w/sponge)  UE Bathing: Minimal assistance(A to wash LUE)  LE Bathing: Moderate assistance(Pt washed ashlyn-area and majority of BLE, LH sponge for feet)  UE Dressing: Minimal assistance(Partial A to thread RUE/pull back over R shoulder)  LE Dressing: None(Pt declined shorts/socks this date.)  Toileting: Maximum assistance(A for hygiene after BM; No clothing to manage)  Additional Comments: Pt T/F'd from bed to toilet with SS. Pt able to wash ashlyn-area and majority of upper legs to shin level while seated on toilet. Pt stood in SS and OT A with washing buttocks and donning clean brief. Pt completed UB ADL's/grooming from w/c at sink.   Pt then used dressing stick to doff socks and LH sponge to wash lower legs/feet with Min A for difficult to

## 2020-05-18 NOTE — PROGRESS NOTES
additional trials. Plan:  [x] Continue ST services    [] Discharge from ST:      Discharge recommendations: [] Inpatient Rehab   [] [place]   [] Outpatient Therapy  [] Follow up at trauma clinic   [] Other:       Treatment completed by: Lisandra Khalil A.CCC/SLP

## 2020-05-18 NOTE — PROGRESS NOTES
requires mod A at this time. Bed mobility  Bridging: Minimal assistance(to support (R)LE)  Scooting: Maximal assistance(to EOB, bed aneudy used to assist)    Transfers:  Sit to Stand: Minimal Assistance(assist placement right UE )  Stand to sit: Minimal Assistance                 Ambulation 1  Surface: level tile  Device: (Green walker lift)  Assistance: Moderate assistance;2 Person assistance;Maximum assistance  Quality of Gait: pt's RLE fatigues quickly, requires assist to take full step.  VC's to weight shift while advancing RLE, with fatigue pt pushes to his R  Distance: 55ft        Stairs/Curb  Stairs?: No              Wheelchair Activities  Propulsion: Yes  Propulsion 1  Propulsion: Manual  Level: Level Tile  Method: LUE;LLE  Level of Assistance: Minimal assistance(10 % of trial for redirection to left)  Description/ Details: straight path and two 90 degree turns made  Distance: 154ft            BALANCE Posture: Fair  Sitting - Static: Good;-  Sitting - Dynamic: Fair  Standing - Static: Fair(umang steady)  Standing - Dynamic: Fair(umang steady)    EXERCISES    Other exercises?: Yes  Other exercises 1: seated LE ex; 2# LLE x 15 AAROM RLE  Other exercises 2: seated blue t-band (R)LE only x 15  Other exercises 3: standing LLE ex x 10 in // bars; therapist in front of RLE for safety (cues for posture and weight shifting)  Other exercises 4: sit<> // bars: min A x 1 plus placement of pts (R) UE on rail  Other exercises 5: Supine (B) LE x 15; RLE AAROM as needed  Other exercises 6: Bridges x 15; Min A to support RLE only  Other exercises 7: standing miguel a in umang steady 2min x 2  Other exercises 8: standing TKE in umang steady x 15 (R) LE   Other exercises 9: standing dynamic reach with LUE in all directions including across midline in umang steady  Other exercises 10: sit<> umang steady x 10           Activity Tolerance: Patient Tolerated treatment well  PT Equipment Recommendations  Equipment Needed: Yes(TBD)       Patient Education  New Education Provided:  bed mobility, transfer training, gait training, strengthening ex  Learner:patient  Method: demonstration and explanation       Outcome: needs reinforcement     Current Treatment Recommendations: Strengthening, Functional Mobility Training, Transfer Training, Gait Training, Safety Education & Training, Balance Training, Endurance Training, Wheelchair Mobility Training, Patient/Caregiver Education & Training, Equipment Evaluation, Education, & procurement, Modalities    Conditions Requiring Skilled Therapeutic Intervention  Body structures, Functions, Activity limitations: Decreased functional mobility ; Decreased strength;Decreased endurance;Decreased sensation;Decreased balance  Assessment: Pt self-limiting today due to nausea, L LE  Barriers to Learning: Aphasic  REQUIRES PT FOLLOW UP: Yes  Discharge Recommendations: Patient would benefit from continued therapy after discharge;24 hour supervision or assist;Home with Home health PT    Goals  Short term goals  Time Frame for Short term goals: 10 days  Short term goal 1: Pt to perform bed mobility/rolling to left at mod A.   Short term goal 2: Improve sitting dynamic balance to fair+  Short term goal 3: Pt to perform sit to stand at min/mod A from w/c/bed  Short term goal 4: Pt able to manuever w/c on level surfaces dist of 50 ft with L UE/L LE  Short term goal 5: Pt able to perform supine<>sit at mod A   Short term goal 6: Progress pt to pivot transfers on strong side with 2 person assist.  Long term goals  Time Frame for Long term goals : By DC  Long term goal 1: Pt able to perform bed mobility at min A   Long term goal 2: Pt able to perform sit to stand at min A    Long term goal 3: Pt able to perform transfers at min a (strong side)/mod a (weak side)  Long term goal 4: Pt able to  // bars for 5 to 10 minutes at min/mod A to perform pregait activiites  Long term goal 5: Progress pt to ambulation either in // bars or with appropriate assistive device dist of 20 to 30 ft, mod A, 2nd person CGA. Long term goal 6: Pt able to manuever w/c on level surface, dist of 150 ft, at supervsion level. Long term goal 7: Improve PASS score from 9/36 to 19/36 reduce fall risk.         05/18/20 1032 05/18/20 1401   PT Individual Minutes   Time In 0935 1350   Time Out 1029 1435   Minutes 54 45       Electronically signed by Lisandra Holguin on 5/18/20 at 3:17 PM EDT

## 2020-05-19 LAB
ANION GAP SERPL CALCULATED.3IONS-SCNC: 9 MMOL/L (ref 9–17)
BUN BLDV-MCNC: 44 MG/DL (ref 8–23)
BUN/CREAT BLD: ABNORMAL (ref 9–20)
CALCIUM SERPL-MCNC: 8.8 MG/DL (ref 8.6–10.4)
CHLORIDE BLD-SCNC: 104 MMOL/L (ref 98–107)
CO2: 23 MMOL/L (ref 20–31)
CREAT SERPL-MCNC: 1.56 MG/DL (ref 0.7–1.2)
GFR AFRICAN AMERICAN: 54 ML/MIN
GFR NON-AFRICAN AMERICAN: 44 ML/MIN
GFR SERPL CREATININE-BSD FRML MDRD: ABNORMAL ML/MIN/{1.73_M2}
GFR SERPL CREATININE-BSD FRML MDRD: ABNORMAL ML/MIN/{1.73_M2}
GLUCOSE BLD-MCNC: 109 MG/DL (ref 70–99)
GLUCOSE BLD-MCNC: 116 MG/DL (ref 75–110)
GLUCOSE BLD-MCNC: 130 MG/DL (ref 75–110)
POTASSIUM SERPL-SCNC: 4.5 MMOL/L (ref 3.7–5.3)
SODIUM BLD-SCNC: 136 MMOL/L (ref 135–144)

## 2020-05-19 PROCEDURE — 97542 WHEELCHAIR MNGMENT TRAINING: CPT

## 2020-05-19 PROCEDURE — 97535 SELF CARE MNGMENT TRAINING: CPT

## 2020-05-19 PROCEDURE — 82947 ASSAY GLUCOSE BLOOD QUANT: CPT

## 2020-05-19 PROCEDURE — 2580000003 HC RX 258: Performed by: INTERNAL MEDICINE

## 2020-05-19 PROCEDURE — 97530 THERAPEUTIC ACTIVITIES: CPT

## 2020-05-19 PROCEDURE — 99232 SBSQ HOSP IP/OBS MODERATE 35: CPT | Performed by: PHYSICAL MEDICINE & REHABILITATION

## 2020-05-19 PROCEDURE — 6370000000 HC RX 637 (ALT 250 FOR IP): Performed by: STUDENT IN AN ORGANIZED HEALTH CARE EDUCATION/TRAINING PROGRAM

## 2020-05-19 PROCEDURE — 6370000000 HC RX 637 (ALT 250 FOR IP): Performed by: INTERNAL MEDICINE

## 2020-05-19 PROCEDURE — 92526 ORAL FUNCTION THERAPY: CPT

## 2020-05-19 PROCEDURE — 6370000000 HC RX 637 (ALT 250 FOR IP): Performed by: PHYSICAL MEDICINE & REHABILITATION

## 2020-05-19 PROCEDURE — 97116 GAIT TRAINING THERAPY: CPT

## 2020-05-19 PROCEDURE — 99232 SBSQ HOSP IP/OBS MODERATE 35: CPT | Performed by: INTERNAL MEDICINE

## 2020-05-19 PROCEDURE — 6370000000 HC RX 637 (ALT 250 FOR IP): Performed by: PSYCHIATRY & NEUROLOGY

## 2020-05-19 PROCEDURE — 97110 THERAPEUTIC EXERCISES: CPT

## 2020-05-19 PROCEDURE — 36415 COLL VENOUS BLD VENIPUNCTURE: CPT

## 2020-05-19 PROCEDURE — 97112 NEUROMUSCULAR REEDUCATION: CPT

## 2020-05-19 PROCEDURE — 1180000000 HC REHAB R&B

## 2020-05-19 PROCEDURE — 92507 TX SP LANG VOICE COMM INDIV: CPT

## 2020-05-19 PROCEDURE — 80048 BASIC METABOLIC PNL TOTAL CA: CPT

## 2020-05-19 RX ORDER — SIMETHICONE 80 MG
80 TABLET,CHEWABLE ORAL EVERY 6 HOURS PRN
Status: DISCONTINUED | OUTPATIENT
Start: 2020-05-19 | End: 2020-05-20

## 2020-05-19 RX ADMIN — ATORVASTATIN CALCIUM 40 MG: 40 TABLET, FILM COATED ORAL at 20:41

## 2020-05-19 RX ADMIN — ACETAMINOPHEN 1000 MG: 500 TABLET ORAL at 20:40

## 2020-05-19 RX ADMIN — ACETAMINOPHEN 1000 MG: 500 TABLET ORAL at 14:51

## 2020-05-19 RX ADMIN — ANTI-FUNGAL POWDER MICONAZOLE NITRATE TALC FREE: 1.42 POWDER TOPICAL at 07:54

## 2020-05-19 RX ADMIN — SODIUM CHLORIDE 500 ML: 9 INJECTION, SOLUTION INTRAVENOUS at 00:01

## 2020-05-19 RX ADMIN — ASPIRIN 81 MG 81 MG: 81 TABLET ORAL at 07:51

## 2020-05-19 RX ADMIN — ESOMEPRAZOLE MAGNESIUM 40 MG: 40 GRANULE, DELAYED RELEASE ORAL at 07:51

## 2020-05-19 RX ADMIN — ACETAMINOPHEN 1000 MG: 500 TABLET ORAL at 06:44

## 2020-05-19 RX ADMIN — LISINOPRIL 20 MG: 20 TABLET ORAL at 07:51

## 2020-05-19 RX ADMIN — APIXABAN 5 MG: 5 TABLET, FILM COATED ORAL at 07:51

## 2020-05-19 RX ADMIN — TRAZODONE HYDROCHLORIDE 50 MG: 50 TABLET ORAL at 20:41

## 2020-05-19 RX ADMIN — APIXABAN 5 MG: 5 TABLET, FILM COATED ORAL at 20:40

## 2020-05-19 RX ADMIN — ANTI-FUNGAL POWDER MICONAZOLE NITRATE TALC FREE: 1.42 POWDER TOPICAL at 20:48

## 2020-05-19 ASSESSMENT — PAIN DESCRIPTION - DESCRIPTORS: DESCRIPTORS: PATIENT UNABLE TO DESCRIBE

## 2020-05-19 ASSESSMENT — PAIN DESCRIPTION - ORIENTATION: ORIENTATION: LEFT

## 2020-05-19 ASSESSMENT — PAIN SCALES - GENERAL
PAINLEVEL_OUTOF10: 4
PAINLEVEL_OUTOF10: 3

## 2020-05-19 ASSESSMENT — PAIN DESCRIPTION - FREQUENCY: FREQUENCY: INTERMITTENT

## 2020-05-19 ASSESSMENT — PAIN DESCRIPTION - PROGRESSION: CLINICAL_PROGRESSION: NOT CHANGED

## 2020-05-19 ASSESSMENT — PAIN DESCRIPTION - DIRECTION: RADIATING_TOWARDS: FROM HIP TO KNEE

## 2020-05-19 ASSESSMENT — PAIN DESCRIPTION - LOCATION: LOCATION: LEG

## 2020-05-19 ASSESSMENT — PAIN DESCRIPTION - PAIN TYPE: TYPE: ACUTE PAIN

## 2020-05-19 NOTE — PROGRESS NOTES
Physical Medicine & Rehabilitation  Progress Note      Subjective:      Patient is 72-year-old male patient past medical history of left MCA infarct on 4/25/2020 secondary to cardioembolic stroke. Patient at the moment with right nondominant hemiparesis, aphasia, dysphasia secondary to her stroke with a PEG. Today patient referred nausea and vomiting are better but still have some burping things in gases. Patient also at the moment with gentle hydration and lisinopril was discontinued as per internal medicine due to his creatinine. No other new acute distress    ROS:  Denies fevers, chills, sweats. No chest pain, palpitations, lightheadedness. Denies coughing, wheezing or shortness of breath. Denies abdominal pain, nausea, diarrhea or constipation. Refer burping and some gas  No new areas of joint pain. Denies new areas of numbness or weakness. Denies new anxiety or depression issues. No new skin problems. Rehabilitation:   Progressing in therapies. PT:  Restrictions/Precautions: Fall Risk, Up as Tolerated(PEG tube)  Required Braces or Orthoses  Other: Abdominal Binder   Transfers  Sit to Stand: Minimal Assistance(assist placement right UE )  Stand to sit: Minimal Assistance  Bed to Chair: Dependent/Total(Umang Echeverria to return to bed.)  Stand Pivot Transfers: Unable to assess(not completed on this date)  Squat Pivot Transfers: Maximum Assistance(to L; no return to safe hand placement cue)  Comment: sit<>stand transfers completed in // bars, umang steady and using walker lift  Ambulation 1  Surface: level tile  Device: (Green walker lift)  Other Apparatus: Wheelchair follow  Assistance: Moderate assistance, 2 Person assistance, Maximum assistance  Quality of Gait: pt's RLE fatigues quickly, requires assist to take full step.  VC's to weight shift while advancing RLE, with fatigue pt pushes to his R  Gait Deviations: Decreased step length, Decreased step height  Distance: 55ft  Comments: Eager to perform and works hard. Transfers  Sit to Stand: Minimal Assistance(assist placement right UE )  Stand to sit: Minimal Assistance  Bed to Chair: Dependent/Total(Umangsay Echeverria to return to bed.)  Stand Pivot Transfers: Unable to assess(not completed on this date)  Squat Pivot Transfers: Maximum Assistance(to L; no return to safe hand placement cue)  Comment: sit<>stand transfers completed in // bars, umang steady and using walker lift  Ambulation  Ambulation?: Yes  Ambulation 1  Surface: level tile  Device: (Green walker lift)  Other Apparatus: Wheelchair follow  Assistance: Moderate assistance, 2 Person assistance, Maximum assistance  Quality of Gait: pt's RLE fatigues quickly, requires assist to take full step. VC's to weight shift while advancing RLE, with fatigue pt pushes to his R  Gait Deviations: Decreased step length, Decreased step height  Distance: 55ft  Comments: Eager to perform and works hard. Surface: level tile  Ambulation 1  Surface: level tile  Device: (Green walker lift)  Other Apparatus: Wheelchair follow  Assistance: Moderate assistance, 2 Person assistance, Maximum assistance  Quality of Gait: pt's RLE fatigues quickly, requires assist to take full step. VC's to weight shift while advancing RLE, with fatigue pt pushes to his R  Gait Deviations: Decreased step length, Decreased step height  Distance: 55ft  Comments: Eager to perform and works hard. OT:  ADL  Equipment Provided: Dressing stick, Long-handled sponge  Feeding: NPO(PEG tube)  Grooming: Setup(Washed face/hands, brushed hair, oral care w/sponge)  UE Bathing: Minimal assistance(A to wash LUE)  LE Bathing: Moderate assistance(Pt washed ashlyn-area and majority of BLE, LH sponge for feet)  UE Dressing: Minimal assistance(Partial A to thread RUE/pull back over R shoulder)  LE Dressing: None(Pt declined shorts/socks this date.)  Toileting: Maximum assistance(A for hygiene after BM;  No clothing to manage)  Additional Comments: Pt T/F'd from bed to distal muscle groups              LUE: 4+/5 in proximal and distal muscle groups              RLE:4-/5 in proximal and distal muscle groups. LLE: 4/5 in proximal and distal muscle groups  EXTREMITIES: No calf tenderness to palpation bilaterally. [place] Clas is negative patient with chronic stable edema on bilateral lower extremities distally  SKIN: warm dry and intact with good turgor. Patient with chronic bilateral lower extremity skin changes due to lymphedema with dryness and hyperkeratosis  PSYCH: appropriately interactive. Affect WNL. Diagnostics:     CBC:   Recent Labs     05/18/20  0722   WBC 8.4   RBC 4.18*   HGB 12.1*   HCT 36.2*   MCV 86.5   RDW 16.3*        BMP:   Recent Labs     05/18/20  0722 05/19/20  0632    136   K 4.9 4.5    104   CO2 22 23   BUN 59* 44*   CREATININE 1.57* 1.56*   GLUCOSE 105* 109*     BNP: No results for input(s): BNP in the last 72 hours. PT/INR: No results for input(s): PROTIME, INR in the last 72 hours. APTT: No results for input(s): APTT in the last 72 hours. CARDIAC ENZYMES: No results for input(s): CKMB, CKMBINDEX, TROPONINT in the last 72 hours. Invalid input(s): CKTOTAL;3  FASTING LIPID PANEL:  Lab Results   Component Value Date    CHOL 111 04/25/2020    HDL 46 04/25/2020    TRIG 80 04/25/2020     LIVER PROFILE: No results for input(s): AST, ALT, ALB, BILIDIR, BILITOT, ALKPHOS in the last 72 hours.      Current Medications:   Current Facility-Administered Medications: 0.9 % sodium chloride infusion, , Intravenous, Continuous  promethazine (PHENERGAN) tablet 12.5 mg, 12.5 mg, Oral, Q6H PRN  ondansetron (ZOFRAN) tablet 4 mg, 4 mg, Oral, Q6H PRN  acetaminophen (TYLENOL) tablet 1,000 mg, 1,000 mg, Oral, 3 times per day  miconazole (MICOTIN) 2 % powder, , Topical, BID  esomeprazole Magnesium (NEXIUM) 40 MG PACK 40 mg, 40 mg, Per J Tube, Daily  traZODone (DESYREL) tablet 50 mg, 50 mg, PEG Tube, Nightly  lisinopril (PRINIVIL;ZESTRIL) tablet

## 2020-05-19 NOTE — PROGRESS NOTES
Speech Language Pathology  Speech Language Pathology  [facility]    Speech Language Treatment Note    Date: 5/19/2020  Patients Name: Robert Byers  MRN: [id number]  Diagnosis:   Patient Active Problem List   Diagnosis Code    Cerebrovascular accident (CVA) ([facility] [address] I63.9    Acute ischemic left MCA stroke ([facility] [address] I63.512    Bradycardia R00.1    Aspiration pneumonia ([facility] [address] J69.0    Leukocytosis D72.829    Essential hypertension I10    Atrial fibrillation ([facility] [address] I48.91    Chronic acquired lymphedema I89.0    Dysphagia due to recent cerebral infarction I69.391    Acute CVA (cerebrovascular accident) ([facility] [address] I63.9    Hemiplegia and hemiparesis following cerebral infarction affecting right non-dominant side ([facility] [address] I69.353    Aphasia due to recent cerebrovascular accident I69.320       Pain: 0/10      Speech and Language Treatment  Treatment time:  4539-2549    Subjective: [x] Alert [x] Cooperative     [] Confused     [] Agitated      [] Lethargic    Objective/Assessment:  Auditory Comprehension:  Pt. Able to follow general directions and answer y/n questions asked. 2 step directions- 75%, 90% c cues. Verbal Expression:  n/a d/t severe apraxia    Reading Comprehension:  N/a    Speech:    Confrontation naming- 80%, 100% when repeating. Pt. Approximations closer to target. Pt. Repeated rhyming words c 70%, 75% c cues. Pt. Demonstrating difficulty and was unable to be cued for production of initial and final positions of /k/, /sh/, /dz/, blends. Pt. Named days of week- 4/7, 7/7 c cues. Counted 1-11 Yulisa c written cues, needed phonemic cues in addition for counting 12-15. Other:    Pt. Frequently belching    Plan:  [x] Continue ST services    [] Discharge from ST:      Discharge recommendations: [] Inpatient Rehab   [] [place]   [] Outpatient Therapy  [] Follow up at trauma clinic   [] Other:       Treatment completed by: Tami Avina A.CCC/SLP

## 2020-05-19 NOTE — PROGRESS NOTES
increasing function or RUE to promote increased IND and safety with self-care and mobility. Long term goals  Time Frame for Long term goals : By Discharge  Long term goal 1: Pt will complete eating/grooming/UB ADL's with set-up A and Good safety using AE/modified techniques as needed. Long term goal 2: Pt will complete LB ADL's/toileting with Min A and Good safety using AE/modified techniques as needed. Long term goal 3: Pt will complete functional transfers with Min-Mod A and Good safety using appropriate DME. Long term goal 4: Pt will demo IND with appropriate HEP for RUE ROM/strength/coordination.      05/19/20 0803   OT Individual Minutes   Time In 0803   Time Out 0928   Minutes 85   Time Code Minutes    Timed Code Treatment Minutes 85 Minutes     Electronically signed by Dafne Ramos on 5/19/20 at 10:54 AM EDT

## 2020-05-19 NOTE — PLAN OF CARE
Problem: Falls - Risk of:  Goal: Will remain free from falls  Description: Will remain free from falls  5/19/2020 1543 by Chester Vega RN  Outcome: Ongoing  Note: Pt educated on and verbalizes understanding of fall risks. Pt wearing nonskid stockings, uses assistive devices appropriately, call light within reach, encouraged to ask for assistance. Increased frequency of rounding. Will continue to monitor and intervene as needed. Problem: Skin Integrity:  Goal: Absence of new skin breakdown  Description: Absence of new skin breakdown  5/19/2020 1543 by Chester Vega RN  Outcome: Ongoing  Note: Pt educated on risks for impaired skin integrity. Pt assisted in keeping skin clean and dry, assisted and prompted to reposition frequently. No s/s of new skin breakdown or infection noted this shift. Will continue to monitor and intervene as needed. Problem: Pain:  Goal: Control of acute pain  Description: Control of acute pain  5/19/2020 1543 by Chester Vega RN  Outcome: Ongoing  Note: Pt rates pain as 4/10 this shift. Pt reports that scheduled tylenol helps with the pain. Pt also accepting of non-pharmacological interventions and is assisted in resting and repositioning this shift. Will continue to monitor and assist as needed.

## 2020-05-20 LAB
ANION GAP SERPL CALCULATED.3IONS-SCNC: 12 MMOL/L (ref 9–17)
BUN BLDV-MCNC: 36 MG/DL (ref 8–23)
BUN/CREAT BLD: ABNORMAL (ref 9–20)
CALCIUM SERPL-MCNC: 8.9 MG/DL (ref 8.6–10.4)
CHLORIDE BLD-SCNC: 106 MMOL/L (ref 98–107)
CO2: 21 MMOL/L (ref 20–31)
CREAT SERPL-MCNC: 1.08 MG/DL (ref 0.7–1.2)
GFR AFRICAN AMERICAN: >60 ML/MIN
GFR NON-AFRICAN AMERICAN: >60 ML/MIN
GFR SERPL CREATININE-BSD FRML MDRD: ABNORMAL ML/MIN/{1.73_M2}
GFR SERPL CREATININE-BSD FRML MDRD: ABNORMAL ML/MIN/{1.73_M2}
GLUCOSE BLD-MCNC: 118 MG/DL (ref 70–99)
GLUCOSE BLD-MCNC: 118 MG/DL (ref 75–110)
POTASSIUM SERPL-SCNC: 5.3 MMOL/L (ref 3.7–5.3)
SODIUM BLD-SCNC: 139 MMOL/L (ref 135–144)

## 2020-05-20 PROCEDURE — 6370000000 HC RX 637 (ALT 250 FOR IP): Performed by: STUDENT IN AN ORGANIZED HEALTH CARE EDUCATION/TRAINING PROGRAM

## 2020-05-20 PROCEDURE — 2580000003 HC RX 258: Performed by: INTERNAL MEDICINE

## 2020-05-20 PROCEDURE — 36415 COLL VENOUS BLD VENIPUNCTURE: CPT

## 2020-05-20 PROCEDURE — 97535 SELF CARE MNGMENT TRAINING: CPT

## 2020-05-20 PROCEDURE — 97542 WHEELCHAIR MNGMENT TRAINING: CPT

## 2020-05-20 PROCEDURE — 1180000000 HC REHAB R&B

## 2020-05-20 PROCEDURE — 97530 THERAPEUTIC ACTIVITIES: CPT

## 2020-05-20 PROCEDURE — 97116 GAIT TRAINING THERAPY: CPT

## 2020-05-20 PROCEDURE — 6370000000 HC RX 637 (ALT 250 FOR IP): Performed by: INTERNAL MEDICINE

## 2020-05-20 PROCEDURE — 97110 THERAPEUTIC EXERCISES: CPT

## 2020-05-20 PROCEDURE — 97112 NEUROMUSCULAR REEDUCATION: CPT

## 2020-05-20 PROCEDURE — 99232 SBSQ HOSP IP/OBS MODERATE 35: CPT | Performed by: INTERNAL MEDICINE

## 2020-05-20 PROCEDURE — 80048 BASIC METABOLIC PNL TOTAL CA: CPT

## 2020-05-20 PROCEDURE — 82947 ASSAY GLUCOSE BLOOD QUANT: CPT

## 2020-05-20 PROCEDURE — 92507 TX SP LANG VOICE COMM INDIV: CPT

## 2020-05-20 PROCEDURE — 6370000000 HC RX 637 (ALT 250 FOR IP): Performed by: PSYCHIATRY & NEUROLOGY

## 2020-05-20 PROCEDURE — 6370000000 HC RX 637 (ALT 250 FOR IP): Performed by: PHYSICAL MEDICINE & REHABILITATION

## 2020-05-20 PROCEDURE — 99232 SBSQ HOSP IP/OBS MODERATE 35: CPT | Performed by: PHYSICAL MEDICINE & REHABILITATION

## 2020-05-20 PROCEDURE — 92526 ORAL FUNCTION THERAPY: CPT

## 2020-05-20 RX ORDER — SIMETHICONE 80 MG
80 TABLET,CHEWABLE ORAL ONCE
Status: COMPLETED | OUTPATIENT
Start: 2020-05-20 | End: 2020-05-20

## 2020-05-20 RX ORDER — SIMETHICONE 80 MG
80 TABLET,CHEWABLE ORAL EVERY 6 HOURS PRN
Status: DISCONTINUED | OUTPATIENT
Start: 2020-05-20 | End: 2020-05-20

## 2020-05-20 RX ORDER — SIMETHICONE 80 MG
80 TABLET,CHEWABLE ORAL EVERY 8 HOURS
Status: DISCONTINUED | OUTPATIENT
Start: 2020-05-20 | End: 2020-05-23 | Stop reason: HOSPADM

## 2020-05-20 RX ORDER — TRAZODONE HYDROCHLORIDE 100 MG/1
100 TABLET ORAL NIGHTLY
Status: DISCONTINUED | OUTPATIENT
Start: 2020-05-20 | End: 2020-05-23 | Stop reason: HOSPADM

## 2020-05-20 RX ADMIN — ANTI-FUNGAL POWDER MICONAZOLE NITRATE TALC FREE: 1.42 POWDER TOPICAL at 19:59

## 2020-05-20 RX ADMIN — ATORVASTATIN CALCIUM 40 MG: 40 TABLET, FILM COATED ORAL at 19:58

## 2020-05-20 RX ADMIN — ACETAMINOPHEN 1000 MG: 500 TABLET ORAL at 14:46

## 2020-05-20 RX ADMIN — ASPIRIN 81 MG 81 MG: 81 TABLET ORAL at 07:43

## 2020-05-20 RX ADMIN — APIXABAN 5 MG: 5 TABLET, FILM COATED ORAL at 07:43

## 2020-05-20 RX ADMIN — ANTI-FUNGAL POWDER MICONAZOLE NITRATE TALC FREE: 1.42 POWDER TOPICAL at 07:43

## 2020-05-20 RX ADMIN — SODIUM CHLORIDE: 9 INJECTION, SOLUTION INTRAVENOUS at 03:26

## 2020-05-20 RX ADMIN — SIMETHICONE CHEW TAB 80 MG 80 MG: 80 TABLET ORAL at 11:47

## 2020-05-20 RX ADMIN — ACETAMINOPHEN 1000 MG: 500 TABLET ORAL at 05:53

## 2020-05-20 RX ADMIN — TRAZODONE HYDROCHLORIDE 100 MG: 100 TABLET ORAL at 19:58

## 2020-05-20 RX ADMIN — ESOMEPRAZOLE MAGNESIUM 40 MG: 40 GRANULE, DELAYED RELEASE ORAL at 07:46

## 2020-05-20 RX ADMIN — ACETAMINOPHEN 1000 MG: 500 TABLET ORAL at 20:22

## 2020-05-20 RX ADMIN — APIXABAN 5 MG: 5 TABLET, FILM COATED ORAL at 19:58

## 2020-05-20 RX ADMIN — SIMETHICONE CHEW TAB 80 MG 80 MG: 80 TABLET ORAL at 20:21

## 2020-05-20 ASSESSMENT — PAIN SCALES - GENERAL
PAINLEVEL_OUTOF10: 4
PAINLEVEL_OUTOF10: 4
PAINLEVEL_OUTOF10: 3
PAINLEVEL_OUTOF10: 5
PAINLEVEL_OUTOF10: 3

## 2020-05-20 ASSESSMENT — PAIN DESCRIPTION - DESCRIPTORS: DESCRIPTORS: PATIENT UNABLE TO DESCRIBE

## 2020-05-20 ASSESSMENT — PAIN DESCRIPTION - LOCATION
LOCATION: LEG
LOCATION: LEG

## 2020-05-20 ASSESSMENT — PAIN DESCRIPTION - ORIENTATION
ORIENTATION: LEFT
ORIENTATION: LEFT

## 2020-05-20 ASSESSMENT — PAIN DESCRIPTION - PROGRESSION: CLINICAL_PROGRESSION: NOT CHANGED

## 2020-05-20 ASSESSMENT — PAIN DESCRIPTION - DIRECTION: RADIATING_TOWARDS: FROM HIP TO KNEE

## 2020-05-20 NOTE — PROGRESS NOTES
step length, Decreased step height, Slow Catherine  Distance: 50' + 30' a.m. with seated rest between  Comments: Eager to perform and works hard. Transfers  Sit to Stand: Minimal Assistance(assist placement right UE (LE PRN with education) )  Stand to sit: Minimal Assistance(R UE safety)  Bed to Chair: Dependent/Total(Angélica Karlie Palacios)  Stand Pivot Transfers: Unable to assess(not completed on this date)  Squat Pivot Transfers: Maximum Assistance(to L; no return to safe hand placement cue)  Comment: Nelsy Barrera & // britany  Ambulation  Ambulation?: Yes  Ambulation 1  Surface: level tile  Device: (Green walker lift)  Other Apparatus: Wheelchair follow  Assistance: 2 Person assistance, Contact guard assistance, Minimal assistance(Min + CGA)  Quality of Gait: Demo's good R LE swing & fair R TKE until fatigued, about 40', another 10' with tactile TKE cues. Less pushing noted today as pt is allowed to push walker lift PRN (rear wheels locked into straight path position)   Gait Deviations: Decreased step length, Decreased step height, Slow Catherine  Distance: 50' + 30' a.m. with seated rest between  Comments: Eager to perform and works hard. Surface: level tile  Ambulation 1  Surface: level tile  Device: (Green walker lift)  Other Apparatus: Wheelchair follow  Assistance: 2 Person assistance, Contact guard assistance, Minimal assistance(Min + CGA)  Quality of Gait: Demo's good R LE swing & fair R TKE until fatigued, about 40', another 10' with tactile TKE cues. Less pushing noted today as pt is allowed to push walker lift PRN (rear wheels locked into straight path position)   Gait Deviations: Decreased step length, Decreased step height, Slow Catherine  Distance: 50' + 30' a.m. with seated rest between  Comments: Eager to perform and works hard.     OT:  ADL  Equipment Provided: Reacher, Long-handled sponge  Feeding: NPO(PEG tube)  Grooming: Setup(Washed face/hands and brushed hair)  UE Bathing: Minimal assistance(A to support RUE Stedy    SPEECH:  Auditory Comprehension:  Pt. Able to follow general directions and answer y/n questions asked.      Verbal Expression:  n/a d/t severe apraxia     Reading Comprehension:  N/a     Speech:    Pt. Named objects around room c 67%, 84% c cues. Pt. Repeated words increasing in complexity (mono, bi and trisyllabic words) c 35% accuracy, 85% c sentence completion cues,  with greater difficulty repeating trisyllabic words d/t apraxia. Pt. Repeated rhyming words c 35%, 50% c sentence completion cues. Pt. Approximations getting much closer to target. Other:        Pt. Required min  visual, verbal cues to follow oral motor exercises x10,  pt. Demonstrating no R sided facial movement upon labial lateralization. When pt. Laughing, pt. Demo. Slight increase in R sided facial movement. With application of  iced lemon swab on pt. Tongue, R buccal cavity, pt. Did elicit swallow A4/8. Delayed cough noted. Objective:  BP (!) 130/52   Pulse 59   Temp 97.5 °F (36.4 °C) (Oral)   Resp 20   Ht 5' 10\" (1.778 m)   Wt (!) 320 lb (145.2 kg)   SpO2 98%   BMI 45.92 kg/m²       GEN: well developed, well nourished, NAD  HEENT: NCAT, PERRL, EOMI, mucous membranes pink and moist  CV: RRR, no murmurs, rubs or gallops  PULM: CTAB, no rales or rhonchi. ABD: soft, NT, ND, BS+ and equal.  PEG placed on left upper quadrant with good condition  NEURO: A&O x3. Patient able to follow 1, 2, 3 step commands. Patient with severe dysarthria and dysphagia. Some component of aphasia. Patient with impaired speech with broken words. Patient refer sensation decreased on right upper and lower extremity to light touch, pinprick. MSK: Functional ROM on left upper extremity and left lower extremity. Flaccid paralysis on right upper extremity and right lower extremity.               RUE: 1/5 on proximal muscle groups and 0/5 in distal muscle groups              LUE: 4+/5 in proximal and distal muscle groups

## 2020-05-20 NOTE — PROGRESS NOTES
appropriate DME. Short term goal 5: Pt will stand for 2-3 minutes with 1-2 UE support and Min A while participating in a functional task. Short term goal 6: Pt will actively participate in 30 minutes of therapeutic exercise/activity with focus on increasing function or RUE to promote increased IND and safety with self-care and mobility. Long term goals  Time Frame for Long term goals : By Discharge  Long term goal 1: Pt will complete eating/grooming/UB ADL's with set-up A and Good safety using AE/modified techniques as needed. Long term goal 2: Pt will complete LB ADL's/toileting with Min A and Good safety using AE/modified techniques as needed. Long term goal 3: Pt will complete functional transfers with Min-Mod A and Good safety using appropriate DME. Long term goal 4: Pt will demo IND with appropriate HEP for RUE ROM/strength/coordination.      05/20/20 0802 05/20/20 1230   OT Individual Minutes   Time In 0802 1230   Time Out 2312 [id number]   Minutes 56 32   Time Code Minutes    Timed Code Treatment Minutes 56 Minutes 32 Minutes     Electronically signed by Sagar Suresh on 5/20/20 at 3:00 PM EDT

## 2020-05-20 NOTE — PROGRESS NOTES
Physical Therapy  [address] [facility]   Acute Rehabilitation Physical Therapy Progress Note    Date: 20  Patient Name: Deandre Roman       Room: 3556/6671-06  MRN: [id number]   Account: [de-identified]   : 1950  (78 y.o.) Gender: male     Referring Practitioner: Dr. Reena Rubin  Diagnosis: L MCA ischemic stroke, s/p emergent mechanical thrombectomy 20  Past Medical History:  has a past medical history of Arterial ischemic stroke, MCA (middle cerebral artery), left, acute ([facility] [address] Atrial fibrillation ([facility] [address] and Stroke ([facility] [address] Past Surgical History:   has a past surgical history that includes Gastrostomy tube placement (2020) and Upper gastrointestinal endoscopy (N/A, 2020). Additional Pertinent Hx: Admitted 20 for R side weakness and aphasia. has emergent mechanical thrombectomy 20. On 20 pt had EGD with PEG tube inserted, admitted to rehab on 5/3/20. Overall Orientation Status: Within Functional Limits  Restrictions/Precautions  Restrictions/Precautions: Fall Risk;Up as Tolerated(PEG tube)  Required Braces or Orthoses?: Yes  Required Braces or Orthoses  Other: Abdominal Binder    Subjective: Pt states L LE pain is continuing, voiced preference for sitting in recliner at end of a.m. session. Comments: Pt determined to walk length of hallway. Vital Signs  Patient Currently in Pain: Yes  Pain Assessment: 0-10  Pain Level: 4  Pain Location: Leg(hip to knee)  Pain Orientation: Left  Non-Pharmaceutical Pain Intervention(s): Acupuncture;Repositioned; Rest  Response to Pain Intervention: None;Patient Satisfied    Bed Mobility  Comment: Pt voicing preference to remain in recliner.      Transfers:  Sit to Stand: Minimal Assistance(A to place R UE (LE PRN with education) )  Stand to sit: Minimal Assistance(R UE safety; no return to VCs to reach back w/L UE)  Stand pivot transfer: Max A (2nd person SBA for safety)    Ambulation 1  Surface: level

## 2020-05-20 NOTE — PROGRESS NOTES
History:     Tobacco:    has no history on file for tobacco.  Alcohol:      has no history on file for alcohol. Drug Use:  has no history on file for drug. Family History:     No family history on file. Review of Systems:     Positive and Negative as described in HPI. Constitutional: Negative for chills, fatigue, fever and unexpected weight change. HENT: Negative for ear discharge, facial swelling, nosebleeds, sore throat, trouble swallowing and voice change. Eyes: Negative for redness and visual disturbance. Respiratory: Negative for cough, shortness of breath and wheezing. Cardiovascular: Negative for chest pain, palpitations and leg swelling. Gastrointestinal: Negative for abdominal pain, blood in stool, diarrhea and vomiting. Genitourinary: Negative for, dysuria and flank pain. Chronic urinary incontinence  Musculoskeletal: Negative for joint swelling. Skin: Bilateral lymphedema, stasis dermatitis is present  Neurological: Right-sided weakness  Hematological: Negative for adenopathy. Does not bruise/bleed easily. Physical Exam:     BP (!) 130/52   Pulse 59   Temp 97.5 °F (36.4 °C) (Oral)   Resp 20   Ht 5' 10\" (1.778 m)   Wt (!) 320 lb (145.2 kg)   SpO2 98%   BMI 45.92 kg/m²   Temp (24hrs), Av.5 °F (36.4 °C), Min:97.5 °F (36.4 °C), Max:97.5 °F (36.4 °C)      General appearance:  alert, cooperative and no distress, obese, slurred speech  Eyes: Anicteric sclera. Pupils are equally round and reactive to light. Extraocular movements are intact.   Lungs:  clear to auscultation bilaterally, normal effort  Heart:  regular rate and rhythm, no murmur  Abdomen:  soft, nontender, nondistended, normal bowel sounds, no masses, hepatomegaly, splenomegaly, PEG tube in situ  Extremities:  no edema, redness, tenderness in the calves  Skin:  no gross lesions, rashes, induration  Neuro:  Alert, oriented X 3, Broca's aphasia, right hemiparesis    Investigations:      Laboratory Testing:  Lab Results   Component Value Date    WBC 8.4 05/18/2020    HGB 12.1 (L) 05/18/2020    HCT 36.2 (L) 05/18/2020    MCV 86.5 05/18/2020     05/18/2020     Lab Results   Component Value Date     05/20/2020    K 5.3 05/20/2020     05/20/2020    CO2 21 05/20/2020    BUN 36 05/20/2020    CREATININE 1.08 05/20/2020    GLUCOSE 118 05/20/2020    CALCIUM 8.9 05/20/2020         Assessment :      Primary Problem  <principal problem not specified>    Active Hospital Problems    Diagnosis Date Noted    Hemiplegia and hemiparesis following cerebral infarction affecting right non-dominant side ([facility] [address] [I69.353]     Aphasia due to recent cerebrovascular accident [I69.320]     Acute CVA (cerebrovascular accident) ([address] [I63.9] 05/03/2020    Atrial fibrillation ([facility] [address] [I48.91] 04/30/2020    Essential hypertension [I10] 04/30/2020    Dysphagia due to recent cerebral infarction [I69.391] 04/30/2020    Acute ischemic left MCA stroke ([facility] [address] [H11.953]        Plan:     1. Right-sided weakness, Broca's aphasia, had acute left MCA infarct status post mechanical thrombectomy, on aspirin, Lipitor  2. Atrial fibrillation, on Eliquis 5 mg twice a day  3. Dysphagia, status post PEG tube placement  4. Hypertension, Readings of Low BP .  5. Aspiration pneumonia, completed antibiotics  6 . Tolerating Tube feed   7. Patient has worsening creatinine with episode of hypotension, starting patient on gentle hydration, will hold lisinopril, repeat BMP tomorrow  8 .   Patient has heart failure with preserved ejection fraction, bilateral lymphedema, arterial scan done today is reported as normal  5/19   Patient has hypertension,  We will discontinue lisinopril  Started on IV fluids  Creatinine slightly better from yesterday  Monitoring BMP  5/20   Creatinine improved,  Hypotension improved  We will discontinue fluids  Will monitor off lisinopril    Consultations:   IP CONSULT TO INTERNAL MEDICINE  IP CONSULT TO NEUROLOGY      Alexandre

## 2020-05-20 NOTE — PROGRESS NOTES
Speech Language Pathology  Speech Language Pathology  [facility]    Speech Language Treatment Note    Date: 5/20/2020  Patients Name: Latosha Hugo  MRN: [id number]  Diagnosis:   Patient Active Problem List   Diagnosis Code    Cerebrovascular accident (CVA) ([facility] [address] I63.9    Acute ischemic left MCA stroke ([facility] [address] I63.512    Bradycardia R00.1    Aspiration pneumonia ([facility] [address] J69.0    Leukocytosis D72.829    Essential hypertension I10    Atrial fibrillation ([facility] [address] I48.91    Chronic acquired lymphedema I89.0    Dysphagia due to recent cerebral infarction I69.391    Acute CVA (cerebrovascular accident) ([facility] [address] I63.9    Hemiplegia and hemiparesis following cerebral infarction affecting right non-dominant side ([facility] [address] I69.353    Aphasia due to recent cerebrovascular accident I69.320       Pain: 0/10      Speech and Language Treatment  Treatment time:  4020-8027    Subjective: [x] Alert [x] Cooperative     [] Confused     [] Agitated      [] Lethargic    Objective/Assessment:  Auditory Comprehension:  Pt. Able to follow general directions and answer y/n questions asked. Verbal Expression:  n/a d/t severe apraxia    Reading Comprehension:  N/a    Speech:    Confrontation naming- 36%, 82% c sentence completion and phonemic cues. Pt. Repeated positive/comparative/superlative words with 50%, 100% c cues,  with greater difficulty repeating superlative multisyllabic  words d/t apraxia. Pt. Named days of week c 57%, 100% c cues. Pt. Counted 1-15 c 93% accuracy, 100% c cues. Pt. Approximations getting much closer to target. Pt. Repeated one syllable words c min A, bisyllabic words c mod A. Pt. stimulable with sentence completion cues. Other:    Pt. Inquiring via gestures when his next swallow study will be scheduled. Writer told him she would arrange prior to d/c to next facility this week (thurs or fri).      Plan:  [x] Continue ST services    [] Discharge from ST:      Discharge recommendations:

## 2020-05-20 NOTE — PROGRESS NOTES
Speech Language Pathology  Speech Language Pathology  [facility]    Speech Language Treatment Note    Date: 5/20/2020  Patients Name: Paula Chavez  MRN: [id number]  Diagnosis:   Patient Active Problem List   Diagnosis Code    Cerebrovascular accident (CVA) ([address] I63.9    Acute ischemic left MCA stroke ([address] I63.512    Bradycardia R00.1    Aspiration pneumonia ([address] J69.0    Leukocytosis D72.829    Essential hypertension I10    Atrial fibrillation ([address] I48.91    Chronic acquired lymphedema I89.0    Dysphagia due to recent cerebral infarction I69.391    Acute CVA (cerebrovascular accident) ([address] I63.9    Hemiplegia and hemiparesis following cerebral infarction affecting right non-dominant side ([address] I69.353    Aphasia due to recent cerebrovascular accident I69.320       Pain: 0/10      Speech and Language Treatment  Treatment time:  1875-6631    Subjective: [x] Alert [x] Cooperative     [] Confused     [] Agitated      [] Lethargic    Objective/Assessment:  Auditory Comprehension:  Pt. Able to follow general directions and answer y/n questions asked. Verbal Expression:  n/a d/t severe apraxia    Reading Comprehension:  N/a    Speech:    Pt. Named objects around room c 67%, 84% c cues. Pt. Repeated words increasing in complexity (mono, bi and trisyllabic words) c 35% accuracy, 85% c sentence completion cues,  with greater difficulty repeating trisyllabic words d/t apraxia. Pt. Repeated rhyming words c 35%, 50% c sentence completion cues. Pt. Approximations getting much closer to target. Other:        Pt. Required min  visual, verbal cues to follow oral motor exercises x10,  pt. Demonstrating no R sided facial movement upon labial lateralization. When pt. Laughing, pt. Demo. Slight increase in R sided facial movement. With application of  iced lemon swab on pt. Tongue, R buccal cavity, pt. Did elicit swallow Q1/7. Delayed cough noted.      Plan:  [x] Continue ST services    []

## 2020-05-21 LAB
ANION GAP SERPL CALCULATED.3IONS-SCNC: 12 MMOL/L (ref 9–17)
ANION GAP SERPL CALCULATED.3IONS-SCNC: 9 MMOL/L (ref 9–17)
BUN BLDV-MCNC: 31 MG/DL (ref 8–23)
BUN BLDV-MCNC: 32 MG/DL (ref 8–23)
BUN/CREAT BLD: ABNORMAL (ref 9–20)
BUN/CREAT BLD: ABNORMAL (ref 9–20)
CALCIUM SERPL-MCNC: 8.8 MG/DL (ref 8.6–10.4)
CALCIUM SERPL-MCNC: 9.2 MG/DL (ref 8.6–10.4)
CHLORIDE BLD-SCNC: 102 MMOL/L (ref 98–107)
CHLORIDE BLD-SCNC: 106 MMOL/L (ref 98–107)
CO2: 20 MMOL/L (ref 20–31)
CO2: 24 MMOL/L (ref 20–31)
CREAT SERPL-MCNC: 1.03 MG/DL (ref 0.7–1.2)
CREAT SERPL-MCNC: 1.07 MG/DL (ref 0.7–1.2)
GFR AFRICAN AMERICAN: >60 ML/MIN
GFR AFRICAN AMERICAN: >60 ML/MIN
GFR NON-AFRICAN AMERICAN: >60 ML/MIN
GFR NON-AFRICAN AMERICAN: >60 ML/MIN
GFR SERPL CREATININE-BSD FRML MDRD: ABNORMAL ML/MIN/{1.73_M2}
GLUCOSE BLD-MCNC: 104 MG/DL (ref 75–110)
GLUCOSE BLD-MCNC: 107 MG/DL (ref 75–110)
GLUCOSE BLD-MCNC: 123 MG/DL (ref 70–99)
GLUCOSE BLD-MCNC: 130 MG/DL (ref 70–99)
POTASSIUM SERPL-SCNC: 4.5 MMOL/L (ref 3.7–5.3)
POTASSIUM SERPL-SCNC: 4.8 MMOL/L (ref 3.7–5.3)
SODIUM BLD-SCNC: 135 MMOL/L (ref 135–144)
SODIUM BLD-SCNC: 138 MMOL/L (ref 135–144)

## 2020-05-21 PROCEDURE — 99231 SBSQ HOSP IP/OBS SF/LOW 25: CPT | Performed by: INTERNAL MEDICINE

## 2020-05-21 PROCEDURE — 6370000000 HC RX 637 (ALT 250 FOR IP): Performed by: PHYSICAL MEDICINE & REHABILITATION

## 2020-05-21 PROCEDURE — 6370000000 HC RX 637 (ALT 250 FOR IP): Performed by: INTERNAL MEDICINE

## 2020-05-21 PROCEDURE — 97116 GAIT TRAINING THERAPY: CPT

## 2020-05-21 PROCEDURE — 97530 THERAPEUTIC ACTIVITIES: CPT

## 2020-05-21 PROCEDURE — 97110 THERAPEUTIC EXERCISES: CPT

## 2020-05-21 PROCEDURE — 92507 TX SP LANG VOICE COMM INDIV: CPT

## 2020-05-21 PROCEDURE — 82947 ASSAY GLUCOSE BLOOD QUANT: CPT

## 2020-05-21 PROCEDURE — 36415 COLL VENOUS BLD VENIPUNCTURE: CPT

## 2020-05-21 PROCEDURE — 6370000000 HC RX 637 (ALT 250 FOR IP): Performed by: STUDENT IN AN ORGANIZED HEALTH CARE EDUCATION/TRAINING PROGRAM

## 2020-05-21 PROCEDURE — 92526 ORAL FUNCTION THERAPY: CPT

## 2020-05-21 PROCEDURE — 97112 NEUROMUSCULAR REEDUCATION: CPT

## 2020-05-21 PROCEDURE — 99231 SBSQ HOSP IP/OBS SF/LOW 25: CPT | Performed by: PHYSICAL MEDICINE & REHABILITATION

## 2020-05-21 PROCEDURE — 97542 WHEELCHAIR MNGMENT TRAINING: CPT

## 2020-05-21 PROCEDURE — 1180000000 HC REHAB R&B

## 2020-05-21 PROCEDURE — 6370000000 HC RX 637 (ALT 250 FOR IP): Performed by: PSYCHIATRY & NEUROLOGY

## 2020-05-21 PROCEDURE — 97535 SELF CARE MNGMENT TRAINING: CPT

## 2020-05-21 PROCEDURE — 80048 BASIC METABOLIC PNL TOTAL CA: CPT

## 2020-05-21 RX ADMIN — ACETAMINOPHEN 1000 MG: 500 TABLET ORAL at 14:41

## 2020-05-21 RX ADMIN — ACETAMINOPHEN 1000 MG: 500 TABLET ORAL at 21:54

## 2020-05-21 RX ADMIN — ACETAMINOPHEN 1000 MG: 500 TABLET ORAL at 06:10

## 2020-05-21 RX ADMIN — ATORVASTATIN CALCIUM 40 MG: 40 TABLET, FILM COATED ORAL at 21:46

## 2020-05-21 RX ADMIN — ASPIRIN 81 MG 81 MG: 81 TABLET ORAL at 09:04

## 2020-05-21 RX ADMIN — SIMETHICONE CHEW TAB 80 MG 80 MG: 80 TABLET ORAL at 06:10

## 2020-05-21 RX ADMIN — APIXABAN 5 MG: 5 TABLET, FILM COATED ORAL at 09:04

## 2020-05-21 RX ADMIN — ANTI-FUNGAL POWDER MICONAZOLE NITRATE TALC FREE: 1.42 POWDER TOPICAL at 09:04

## 2020-05-21 RX ADMIN — SIMETHICONE CHEW TAB 80 MG 80 MG: 80 TABLET ORAL at 21:50

## 2020-05-21 RX ADMIN — TRAZODONE HYDROCHLORIDE 100 MG: 100 TABLET ORAL at 21:47

## 2020-05-21 RX ADMIN — ANTI-FUNGAL POWDER MICONAZOLE NITRATE TALC FREE: 1.42 POWDER TOPICAL at 21:50

## 2020-05-21 RX ADMIN — APIXABAN 5 MG: 5 TABLET, FILM COATED ORAL at 21:47

## 2020-05-21 RX ADMIN — SIMETHICONE CHEW TAB 80 MG 80 MG: 80 TABLET ORAL at 14:41

## 2020-05-21 RX ADMIN — ESOMEPRAZOLE MAGNESIUM 40 MG: 40 GRANULE, DELAYED RELEASE ORAL at 09:04

## 2020-05-21 ASSESSMENT — PAIN DESCRIPTION - LOCATION
LOCATION: LEG
LOCATION: LEG

## 2020-05-21 ASSESSMENT — PAIN DESCRIPTION - PROGRESSION
CLINICAL_PROGRESSION: GRADUALLY IMPROVING
CLINICAL_PROGRESSION: NOT CHANGED
CLINICAL_PROGRESSION: GRADUALLY IMPROVING

## 2020-05-21 ASSESSMENT — PAIN SCALES - GENERAL
PAINLEVEL_OUTOF10: 3
PAINLEVEL_OUTOF10: 4
PAINLEVEL_OUTOF10: 3

## 2020-05-21 ASSESSMENT — PAIN DESCRIPTION - DESCRIPTORS: DESCRIPTORS: PATIENT UNABLE TO DESCRIBE

## 2020-05-21 ASSESSMENT — PAIN SCALES - WONG BAKER: WONGBAKER_NUMERICALRESPONSE: 0

## 2020-05-21 ASSESSMENT — PAIN DESCRIPTION - PAIN TYPE
TYPE: ACUTE PAIN
TYPE: ACUTE PAIN

## 2020-05-21 ASSESSMENT — PAIN DESCRIPTION - FREQUENCY: FREQUENCY: INTERMITTENT

## 2020-05-21 ASSESSMENT — PAIN DESCRIPTION - ORIENTATION
ORIENTATION: LEFT
ORIENTATION: LEFT

## 2020-05-21 NOTE — PLAN OF CARE
Problem: Skin Integrity:  Goal: Absence of new skin breakdown  Description: Absence of new skin breakdown  Outcome: Ongoing  Note: No new occurrence of skin breakdown noted during this shift. Problem: Falls - Risk of:  Goal: Will remain free from falls  Description: Will remain free from falls  Outcome: Ongoing  Note: Patient remains free of incidence/ injury. Bed remains in low position. Bed alarm on. Up with two and a umang stedy. Problem: Pain:  Goal: Control of acute pain  Description: Control of acute pain  Outcome: Ongoing  Note: Pt denies need for prn pain medication this shift.

## 2020-05-21 NOTE — PROGRESS NOTES
Physical Medicine & Rehabilitation  Progress Note      Subjective:      Patient is 77-year-old male patient past medical history of left MCA infarct on 4/25/2020 secondary to cardioembolic stroke. Patient at the moment with right nondominant hemiparesis, aphasia, dysphasia secondary to her stroke with a PEG. Today patient was evaluated at bedside and found AAOx3, NAD, afebrile. Still with expressive aphasia/dysarthria. Patient refers medication (simethicone) has been helping. He was able to sleep from about 10PM till 1AM but then was awake till about 3:00-3:30AM. Nausea has been better controlled. Still getting PT, OT, speech. Anxious to have another swallow evaluation. ROS:  Denies fevers, chills, sweats. No chest pain, palpitations, lightheadedness. Denies coughing, wheezing or shortness of breath. Denies abdominal pain, nausea, diarrhea or constipation. No new areas of joint pain. Denies new areas of numbness or weakness. Denies new anxiety or depression issues. No new skin problems. Rehabilitation:   Progressing in therapies. PT:  Restrictions/Precautions: Fall Risk, Up as Tolerated(PEG tube)  Required Braces or Orthoses  Other: Abdominal Binder   Transfers  Sit to Stand: Minimal Assistance(A to place R UE (LE PRN with education) )  Stand to sit: Minimal Assistance(R UE safety; no return to VCs to reach back w/L UE)  Bed to Chair: Dependent/Total(Angélica Messer)  Stand Pivot Transfers: Maximum Assistance(2nd person SBA for safety)  Squat Pivot Transfers: Maximum Assistance(to L; no return to safe hand placement cue)  Comment: Scott Vigil & // britany  Ambulation 1  Surface: level tile  Device: (Green walker lift)  Other Apparatus: Wheelchair follow  Assistance: 2 Person assistance, Minimal assistance  Quality of Gait: Demo's fair R LE swing until fatigued. Fair+ R TKE today with no buckling observed.  Less pushing noted today as pt is allowed to push walker lift PRN (rear wheels locked into straight path position)   Gait Deviations: Decreased step length, Decreased step height, Slow Catherine  Distance: 79' & 100' (with 1 standing rest break) a.m. Comments: Pt wearing shoes. Pt determined to finish length of hallway without sitting. Attempting to \"pump up\" height of walker while standing/walking, but demo's good posture and foot placement (when not fatigued)    Transfers  Sit to Stand: Minimal Assistance(A to place R UE (LE PRN with education) )  Stand to sit: Minimal Assistance(R UE safety; no return to VCs to reach back w/L UE)  Bed to Chair: Dependent/Total(Angélica Smith)  Stand Pivot Transfers: Maximum Assistance(2nd person SBA for safety)  Squat Pivot Transfers: Maximum Assistance(to L; no return to safe hand placement cue)  Comment: Darling Sanders & // bars  Ambulation  Ambulation?: Yes  Ambulation 1  Surface: level tile  Device: (Green walker lift)  Other Apparatus: Wheelchair follow  Assistance: 2 Person assistance, Minimal assistance  Quality of Gait: Demo's fair R LE swing until fatigued. Fair+ R TKE today with no buckling observed. Less pushing noted today as pt is allowed to push walker lift PRN (rear wheels locked into straight path position)   Gait Deviations: Decreased step length, Decreased step height, Slow Catherine  Distance: 79' & 100' (with 1 standing rest break) a.m. Comments: Pt wearing shoes. Pt determined to finish length of hallway without sitting. Attempting to \"pump up\" height of walker while standing/walking, but demo's good posture and foot placement (when not fatigued)    Surface: level tile  Ambulation 1  Surface: level tile  Device: (Green walker lift)  Other Apparatus: Wheelchair follow  Assistance: 2 Person assistance, Minimal assistance  Quality of Gait: Demo's fair R LE swing until fatigued. Fair+ R TKE today with no buckling observed.  Less pushing noted today as pt is allowed to push walker lift PRN (rear wheels locked into straight path position)   Gait Deviations: Decreased step length, Decreased step height, Slow Catherine  Distance: 79' & 100' (with 1 standing rest break) a.m. Comments: Pt wearing shoes. Pt determined to finish length of hallway without sitting. Attempting to \"pump up\" height of walker while standing/walking, but demo's good posture and foot placement (when not fatigued)    OT:  ADL  Equipment Provided: Reacher, Long-handled sponge  Feeding: NPO(PEG tube)  Grooming: Setup(Washed face/hands, brushed hair, oral care w/sponge)  UE Bathing: Minimal assistance  LE Bathing: Moderate assistance  UE Dressing: Minimal assistance  LE Dressing: Maximum assistance(Min A for shorts w/reacher; TA for socks/shoes)  Toileting: Maximum assistance(Partial A w/clothing; TA for hygiene after BM)  Additional Comments: Pt washed ashlyn-area and majority of legs to shin level while seated on toilet. Remainder of tasks completed from w/c level or standing in SS. Pt with good tolerance to task this date, improved use of elba-techniques, and improved coordination with use of reacher. Balance  Sitting Balance: Supervision  Standing Balance: Contact guard assistance(Occasional Min A when completing tasks w/LUE)   Standing Balance  Time: 2-3 minutes x3  Activity: ADL's  Comment: 0-1 UE support; CGA for most tasks with occasional need for Min A  Functional Mobility  Functional - Mobility Device: Wheelchair  Activity: To/from bathroom  Assist Level: Dependent/Total     Bed mobility  Bridging: Minimal assistance(to support (R)LE)  Rolling to Left: Minimal assistance  Rolling to Right: Minimal assistance  Supine to Sit: Minimal assistance  Sit to Supine: Moderate assistance  Scooting: Minimal assistance(for R hip at EOB)  Comment: Pt left up in w/c at end of session.   Transfers  Sit to stand: Minimal assistance(Varied CGA/Min A depending on height of surface)  Stand to sit: Minimal assistance, Moderate assistance(Mod A to sit on toilet; Min A to sit in w/c)  Transfer Comments: Miesha Beasley decreased on right upper and lower extremity to light touch, pinprick. MSK: Functional ROM on left upper extremity and left lower extremity. Flaccid paralysis on right upper extremity and right lower extremity. RUE: 1/5 on proximal muscle groups and 0/5 in distal muscle groups              LUE: 4+/5 in proximal and distal muscle groups              RLE:4-/5 in proximal and distal muscle groups. LLE: 4/5 in proximal and distal muscle groups  EXTREMITIES: No calf tenderness to palpation bilaterally. Kushal Bacca is negative patient with chronic stable edema on bilateral lower extremities distally  SKIN: warm dry and intact with good turgor. Patient with chronic bilateral lower extremity skin changes due to lymphedema with dryness and hyperkeratosis  PSYCH: appropriately interactive. Affect WNL. Diagnostics:     CBC:   No results for input(s): WBC, RBC, HGB, HCT, MCV, RDW, PLT in the last 72 hours. BMP:   Recent Labs     05/19/20  0632 05/20/20  0933 05/21/20  0651    139 138   K 4.5 5.3 4.5    106 106   CO2 23 21 20   BUN 44* 36* 32*   CREATININE 1.56* 1.08 1.03   GLUCOSE 109* 118* 123*     BNP: No results for input(s): BNP in the last 72 hours. PT/INR: No results for input(s): PROTIME, INR in the last 72 hours. APTT: No results for input(s): APTT in the last 72 hours. CARDIAC ENZYMES: No results for input(s): CKMB, CKMBINDEX, TROPONINT in the last 72 hours. Invalid input(s): CKTOTAL;3  FASTING LIPID PANEL:  Lab Results   Component Value Date    CHOL 111 04/25/2020    HDL 46 04/25/2020    TRIG 80 04/25/2020     LIVER PROFILE: No results for input(s): AST, ALT, ALB, BILIDIR, BILITOT, ALKPHOS in the last 72 hours.      Current Medications:   Current Facility-Administered Medications: simethicone (MYLICON) chewable tablet 80 mg, 80 mg, PEG Tube, Q8H  traZODone (DESYREL) tablet 100 mg, 100 mg, PEG Tube, Nightly  promethazine (PHENERGAN) tablet 12.5 mg, 12.5 mg, Oral, Q6H PRN  ondansetron (ZOFRAN) tablet 4 mg, 4 mg, Oral, Q6H PRN  acetaminophen (TYLENOL) tablet 1,000 mg, 1,000 mg, Oral, 3 times per day  miconazole (MICOTIN) 2 % powder, , Topical, BID  esomeprazole Magnesium (NEXIUM) 40 MG PACK 40 mg, 40 mg, Per J Tube, Daily  apixaban (ELIQUIS) tablet 5 mg, 5 mg, Oral, BID  albuterol (PROVENTIL) nebulizer solution 2.5 mg, 2.5 mg, Nebulization, As Directed RT PRN  aspirin chewable tablet 81 mg, 81 mg, PEG Tube, Daily  atorvastatin (LIPITOR) tablet 40 mg, 40 mg, PEG Tube, Nightly  polyethylene glycol (GLYCOLAX) packet 17 g, 17 g, Oral, Daily PRN      Impression/Plan:   Impaired ADLs, gait, and mobility due to:    1. Ischemic stroke with right hemiparesis: PT/OT  for gait, mobility, strengthening, endurance, ADLs, and self care. Patient currently on aspirin, atorvastatin  2. Aphasia/dysphagia: Patient currently on speech training. Patient with severe expressive aphasia with apraxia. Tube feedings are done via PEG. No currently on bolus. Patient was given Phenergan and has resolution. Last swallow study showed severe dysphagia. Patient on Simethicone for stomach discomfort. 3. Left leg pain: Vascular studies on 5/18/2020 were unremarkable  4. WILLIAM: Patient's creatinine better today 1.03. Internal medicine aware. At moment doing gentle hydration taking in consideration patient's CHF and lisinopril was discontinued for the moment. We will keep following up with internal medicine. Pending restarting BP med  5. Aspiration pneumonia: Completed Augmentin 5 out of 5 days. Has albuterol nebulizers as needed  6. Patient with hyponatremia: Improved. Will get BMP weekly  7. Atrial fibrillation: Patient on Eliquis resumed on 5/5/2020. We will continue the steroids as per neurology for cardioembolic stroke. Patient will follow-up in clinic with neurology after discharge  8.  Left leg pain/cellulitis: Patient was on Bactrim for cellulitis (finished 7 days on 5/18/2020) Patient also needs to continue Tylenol 3 times a day. Start ice routinely  9. Insomnia: better with trazodone at night for medical management. Currently at 100mg PO nightly, changed yesterday. Was able to sleep for about 3-4 hours before waking up and then falling asleep  10. GERD: On protonix  11. HTN: stable wth Lisinopril. Discontinued today due to increased in creatinine. Will monitor along with IM  12. To medicine for medical management  13. DVT prophylaxis: On Eliquis    Electronically signed by:    Sara Burger MD [facility]  Adult General Neurology Resident PGY-3  5/21/2020 at 8:35 AM      This note is created with the assistance of a speech recognition program.  While intending to generate a document that actually reflects the content of the visit, the document can still have some errors including those of syntax and sound a like substitutions which may escape proof reading. In such instances, actual meaning can be extrapolated by contextual diversion.

## 2020-05-21 NOTE — PROGRESS NOTES
to 6 am; Bolus 180 mL 3x/day    · Volume (ml/day): 1440 mL/day  · Duration: Continuous, Bolus  · Water Flushes: 200 mL x 4 daily; 30 mL before and after each bolus feeding  · Current TF & Flush Orders Provides: 2160 kcal, 106 gm of protein, 1959 mL of free water   · Anthropometric Measures:  · Ht: 5' 10\" (177.8 cm)   · Current Body Wt: 320 lb (145.2 kg)  · Admission Body Wt: 320 lb (145.2 kg)  · Ideal Body Wt: 166 lb (75.3 kg), % Ideal Body 193%  · BMI Classification: BMI > or equal to 40.0 Obese Class III    Nutrition Interventions:   Continue NPO, Continue current Tube Feeding  Continued Inpatient Monitoring    Nutrition Evaluation:   · Evaluation: Progressing toward goals   · Goals: Enteral feeding will meet greater than 85% of estimated nutrition needs   · Monitoring: Weight, Pertinent Labs, Monitor Bowel Function, Chewing/Swallowing, TF Intake, TF Tolerance, Nausea or Vomiting, Skin Integrity, I&O    Some areas of assessment may be incomplete due to standard COVID-19 Precautions. Gayle Jean-Baptiste R.D., L.D.   Phone: [phone]

## 2020-05-21 NOTE — PROGRESS NOTES
[address]   ACUTE REHABILITATION OCCUPATIONAL THERAPY  DAILY NOTE    Date: 20  Patient Name: Paula Chavez      Room: 2372/9514-46    MRN: [id number]   : 1950  (78 y.o.)  Gender: male   Referring Practitioner: Dr. Shandra Long  Diagnosis: L MCA ischemic stroke, s/p emergent mechanical thrombectomy 20    Restrictions  Restrictions/Precautions: Fall Risk, Up as Tolerated(PEG tube)  Required Braces or Orthoses  Other: Abdominal Binder  Required Braces or Orthoses?: Yes  Equipment Used: Bed, Wheelchair    Subjective  Subjective: Pt with less verbal communication this date compared to yesterday, although did say \"yes\", \"no\", \"good morning\", and \"I don't know\"  Patient Currently in Pain: Yes  Pain Level: 3  Pain Location: Leg  Pain Orientation: Left  Restrictions/Precautions: Fall Risk;Up as Tolerated(PEG tube)        Pain Assessment  Pain Assessment: 0-10  Pain Level: 3  Patient's Stated Pain Goal: No pain  Pain Type: Acute pain  Pain Location: Leg  Pain Orientation: Left  Pain Radiating Towards: From hip to knee  Pain Descriptors: Patient unable to describe  Pain Frequency: Intermittent  Clinical Progression: Gradually improving  Response to Pain Intervention: None  Multiple Pain Sites: No    Objective  Cognition  Overall Cognitive Status: WFL  Perception  Overall Perceptual Status: WFL  Balance  Sitting Balance: Supervision  Standing Balance: Contact guard assistance(in Fabiano Brooks with LUE support, occasionally unsupported)  Bed mobility  Supine to Sit: Minimal assistance  Scooting: Minimal assistance  Transfers  Sit to stand: Minimal assistance(Varied CGA>Min A)  Stand to sit: Minimal assistance(Consistently Min A this date)  Transfer Comments: Fabiano Brooks used for all transfers; decreased verbal cues required for safe/controlled stand>sit.  improved with practice  Standing Balance  Time: Several 1-2 minutes intervals  Activity: ADL's/transfers  Comment: 0-1 UE support; use of elba-techniques, and improved coordination with use of reacher. Assessment  Performance deficits / Impairments: Decreased functional mobility ; Decreased ADL status; Decreased ROM; Decreased strength;Decreased endurance;Decreased sensation;Decreased balance;Decreased fine motor control;Decreased coordination;Decreased posture  Prognosis: Good  Discharge Recommendations: 24 hour supervision or assist  Activity Tolerance: Patient Tolerated treatment well  Activity Tolerance: Pt with decreased c/o pain this date and no c/o nausea/dizziness  Safety Devices in place: Yes  Type of devices: Patient at risk for falls;Gait belt;Left in chair;Call light within reach          Patient Education:  Patient Goals   Patient goals : Pt with expressive aphasia - unable to state goals, but agreeable to OT goals of increasing IND and addressing function of RUE. Demo'd agreement with a thumbs up. Continued review of elba-techniques and AE for ADL's; purpose/goals of vibration to RUE and other neuro re-ed techniques; transfer training with focus on increased control during stand>sit  Learner:patient  Method: demonstration and explanation       Outcome: demonstrated understanding and needs reinforcement     Plan  Plan  Times per week: 5-7  Times per day: Twice a day  Current Treatment Recommendations: Functional Mobility Training, Endurance Training, Safety Education & Training, Equipment Evaluation, Education, & procurement, Patient/Caregiver Education & Training, Self-Care / ADL, Strengthening, ROM, Balance Training, Neuromuscular Re-education, Positioning  Patient Goals   Patient goals : Pt with expressive aphasia - unable to state goals, but agreeable to OT goals of increasing IND and addressing function of RUE. Demo'd agreement with a thumbs up. Short term goals  Time Frame for Short term goals:  One Week  Short term goal 1: Pt will complete eating (if swallowing improves for PO intake)/grooming with Min A using

## 2020-05-21 NOTE — PROGRESS NOTES
Physical Therapy  [address] [facility]   Acute Rehabilitation Physical Therapy Progress Note    Date: 20  Patient Name: Kim Gaines       Room: 9209/0000-21  MRN: [id number]   Account: [de-identified]   : 1950  (78 y.o.) Gender: male     Referring Practitioner: Dr. Damian Romano  Diagnosis: L MCA ischemic stroke, s/p emergent mechanical thrombectomy 20  Past Medical History:  has a past medical history of Arterial ischemic stroke, MCA (middle cerebral artery), left, acute ([address] Atrial fibrillation ([address] and Stroke ([address] Past Surgical History:   has a past surgical history that includes Gastrostomy tube placement (2020) and Upper gastrointestinal endoscopy (N/A, 2020). Additional Pertinent Hx: Admitted 20 for R side weakness and aphasia. has emergent mechanical thrombectomy 20. On 20 pt had EGD with PEG tube inserted, admitted to rehab on 5/3/20. Overall Orientation Status: Within Functional Limits  Orientation Level: Oriented to situation, Oriented to person  Restrictions/Precautions  Restrictions/Precautions: Fall Risk;Up as Tolerated(PEG tube)  Required Braces or Orthoses?: Yes  Required Braces or Orthoses  Other: Abdominal Binder    Subjective: PT reports getting 4 hours of sleep last night. Pt demos pointing to 4 with expressive sheet. PM: Pt reports increase fatigue this PM however agreeable to tolerance. Comments: Pt determined to walk length of hallway. PM: Pt had BM incontinence at end of tx. Writer assisted PCT in functional/bed  mobility while PCT assisted with cleaning pt up.      Vital Signs  BP Location: Right lower arm  Level of Consciousness: Alert  Patient Currently in Pain: Yes  Pain Level: 3  Ellington-Baker Pain Rating: No hurt  Pain Type: Acute pain  Pain Location: Leg  Pain Orientation: Left  Clinical Progression: Not changed  Response to Pain Intervention: None     Oxygen Therapy  O2 Device: None (Room air)  Patient Observation  Observations: Pt reports min dizziness this date however improves within 1-2 min. Bed Mobility:   Bed Mobility  Bridging: Minimal assistance(to support (R)LE)  Rolling: Moderate assistance;Rolling Left;Minimal assistance;Rolling Right  Supine to Sit: Minimal assistance(assist with R UE/LE, vc's for technique)  Sit to Supine: Minimal assistance(R LE assist)  Scooting: Minimal assistance(for R hip at EOB)  Comment: Min A x1 to right, Mod A x1 to left; rolling in bed. Pt able to utilize right bed rail with LUE. Transfers:  Sit to Stand: Minimal Assistance(A to place R UE (LE PRN with education) )  Stand to sit: Minimal Assistance(R UE safety; no return to VCs to reach back w/L UE)  Bed to Chair: Dependent/Total(Angélica Echeverria)     Squat Pivot Transfers: Maximum Assistance(to L; vc's safe hand placement)    Jeremias Kingsley. Multiple stand/squat pivots with vc's on safety techniques. Ambulation 1  Surface: level tile  Device: (Green walker lift)  Other Apparatus: Wheelchair follow  Assistance: 2 Person assistance;Minimal assistance  Quality of Gait: Demo's fair R LE swing until fatigued. Fair+ R TKE today with no buckling observed. Less pushing noted today as pt is allowed to push walker lift PRN (rear wheels locked into straight path position) one LOB noted requiring Mod A x2. Gait Deviations: Decreased step length;Decreased step height;Slow Catherine  Distance: 100' & 100' (with 1 standing rest break and 1 seated rest break  Comments: PT likes to pump green lift walker to his height. 2 A for safety.          Stairs/Curb  Stairs?: No              Wheelchair Activities  Propulsion: Yes  Propulsion 1  Propulsion: Manual  Level: Level Tile  Method: LUE;LLE  Level of Assistance: Minimal assistance(Min A to initiate rolling, avoid R side obstacles)  Description/ Details: straight path and two 90 degree turns x3 made; VC req'd to prevent running R foot into obstacles (door frame, bed) Pt able to propel from room to PT gym. Distance: 300'                           BALANCE Posture: Fair  Sitting - Static: Fair;+(EOC, single UE support, no back support)  Sitting - Dynamic: Fair(EOC, no back or UE support)  Standing - Static: Fair(umang steady)  Standing - Dynamic: Fair(umang steady)  Comments: SEated balance assessed on EOM, STanding balance assessed in green lift walker. EXERCISES    Other exercises?: Yes  Other exercises 1: Seated B LE ther ex, AROM/purple (max) resistance band L LE, AAROM R LE, 20x (or 2x10 R L E PRN)(Educated on positioning to increase ROM)  Other exercises 5: Supine (B) LE x 20; RLE A/AROM as needed LLE 2# ankle weight. Other exercises 6: Bridges x 15; Min A to support RLE only  Other exercises 7: standing miguel a in umang steady 2min x 2  Other exercises 11: Stand pivot x8 total from bed, mat, recliner, and w/c. Pt transfers to strong side  (Left) with Min to Mod A x1 and SBA x1 for safety. Other exercises 12: Rolling in bed L<>R  with Min to Mod A x1. Other exercises 13: Pt requires Max A x2 to boost to [facility] with trandelenburg position for gravity assist.   Other Activities  Other Activities: (Communication with MD/RN )  Comment: rest breaks PRN.          Activity Tolerance: Patient Tolerated treatment well, Patient limited by endurance  Activity Tolerance: PM: fatigue is limiting   PT Equipment Recommendations  Equipment Needed: Yes(TBD)       Patient Education  New Education Provided:    Learner:patient  Method: demonstration and explanation       Outcome: needs reinforcement     Current Treatment Recommendations: Strengthening, Functional Mobility Training, Transfer Training, Gait Training, Safety Education & Training, Balance Training, Endurance Training, Wheelchair Mobility Training, Patient/Caregiver Education & Training, Equipment Evaluation, Education, & procurement, Modalities    Conditions Requiring Skilled Therapeutic Intervention  Body structures, Functions, Activity

## 2020-05-22 ENCOUNTER — APPOINTMENT (OUTPATIENT)
Dept: GENERAL RADIOLOGY | Age: 70
DRG: 056 | End: 2020-05-22
Attending: PHYSICAL MEDICINE & REHABILITATION
Payer: MEDICARE

## 2020-05-22 LAB
ANION GAP SERPL CALCULATED.3IONS-SCNC: 11 MMOL/L (ref 9–17)
BUN BLDV-MCNC: 25 MG/DL (ref 8–23)
BUN/CREAT BLD: ABNORMAL (ref 9–20)
CALCIUM SERPL-MCNC: 9.1 MG/DL (ref 8.6–10.4)
CHLORIDE BLD-SCNC: 105 MMOL/L (ref 98–107)
CO2: 25 MMOL/L (ref 20–31)
CREAT SERPL-MCNC: 1.06 MG/DL (ref 0.7–1.2)
GFR AFRICAN AMERICAN: >60 ML/MIN
GFR NON-AFRICAN AMERICAN: >60 ML/MIN
GFR SERPL CREATININE-BSD FRML MDRD: ABNORMAL ML/MIN/{1.73_M2}
GFR SERPL CREATININE-BSD FRML MDRD: ABNORMAL ML/MIN/{1.73_M2}
GLUCOSE BLD-MCNC: 106 MG/DL (ref 70–99)
GLUCOSE BLD-MCNC: 126 MG/DL (ref 75–110)
POTASSIUM SERPL-SCNC: 4.6 MMOL/L (ref 3.7–5.3)
SODIUM BLD-SCNC: 141 MMOL/L (ref 135–144)

## 2020-05-22 PROCEDURE — 99211 OFF/OP EST MAY X REQ PHY/QHP: CPT

## 2020-05-22 PROCEDURE — 92611 MOTION FLUOROSCOPY/SWALLOW: CPT

## 2020-05-22 PROCEDURE — 97535 SELF CARE MNGMENT TRAINING: CPT

## 2020-05-22 PROCEDURE — 99212 OFFICE O/P EST SF 10 MIN: CPT

## 2020-05-22 PROCEDURE — 97112 NEUROMUSCULAR REEDUCATION: CPT

## 2020-05-22 PROCEDURE — 80048 BASIC METABOLIC PNL TOTAL CA: CPT

## 2020-05-22 PROCEDURE — 6370000000 HC RX 637 (ALT 250 FOR IP): Performed by: PHYSICAL MEDICINE & REHABILITATION

## 2020-05-22 PROCEDURE — 99231 SBSQ HOSP IP/OBS SF/LOW 25: CPT | Performed by: INTERNAL MEDICINE

## 2020-05-22 PROCEDURE — 6370000000 HC RX 637 (ALT 250 FOR IP): Performed by: PSYCHIATRY & NEUROLOGY

## 2020-05-22 PROCEDURE — 74230 X-RAY XM SWLNG FUNCJ C+: CPT

## 2020-05-22 PROCEDURE — 97116 GAIT TRAINING THERAPY: CPT

## 2020-05-22 PROCEDURE — 36415 COLL VENOUS BLD VENIPUNCTURE: CPT

## 2020-05-22 PROCEDURE — 82947 ASSAY GLUCOSE BLOOD QUANT: CPT

## 2020-05-22 PROCEDURE — 6370000000 HC RX 637 (ALT 250 FOR IP): Performed by: STUDENT IN AN ORGANIZED HEALTH CARE EDUCATION/TRAINING PROGRAM

## 2020-05-22 PROCEDURE — 6370000000 HC RX 637 (ALT 250 FOR IP): Performed by: INTERNAL MEDICINE

## 2020-05-22 PROCEDURE — 97542 WHEELCHAIR MNGMENT TRAINING: CPT

## 2020-05-22 PROCEDURE — 97110 THERAPEUTIC EXERCISES: CPT

## 2020-05-22 PROCEDURE — 1180000000 HC REHAB R&B

## 2020-05-22 PROCEDURE — 99232 SBSQ HOSP IP/OBS MODERATE 35: CPT | Performed by: PHYSICAL MEDICINE & REHABILITATION

## 2020-05-22 PROCEDURE — 92526 ORAL FUNCTION THERAPY: CPT

## 2020-05-22 PROCEDURE — 92507 TX SP LANG VOICE COMM INDIV: CPT

## 2020-05-22 PROCEDURE — 97530 THERAPEUTIC ACTIVITIES: CPT

## 2020-05-22 RX ORDER — ALBUTEROL SULFATE 2.5 MG/3ML
2.5 SOLUTION RESPIRATORY (INHALATION) EVERY 6 HOURS PRN
Qty: 120 EACH | Refills: 3 | DISCHARGE
Start: 2020-05-22

## 2020-05-22 RX ORDER — ESOMEPRAZOLE MAGNESIUM 40 MG/1
40 FOR SUSPENSION ORAL DAILY
Qty: 30 PACKET | Refills: 3 | DISCHARGE
Start: 2020-05-23

## 2020-05-22 RX ORDER — TRAZODONE HYDROCHLORIDE 100 MG/1
100 TABLET ORAL NIGHTLY
DISCHARGE
Start: 2020-05-22

## 2020-05-22 RX ORDER — POLYETHYLENE GLYCOL 3350 17 G/17G
17 POWDER, FOR SOLUTION ORAL DAILY PRN
Qty: 527 G | Refills: 1 | DISCHARGE
Start: 2020-05-22 | End: 2020-06-21

## 2020-05-22 RX ORDER — ONDANSETRON 4 MG/1
4 TABLET, FILM COATED ORAL EVERY 6 HOURS PRN
DISCHARGE
Start: 2020-05-22

## 2020-05-22 RX ORDER — SIMETHICONE 80 MG
80 TABLET,CHEWABLE ORAL EVERY 8 HOURS
Qty: 180 TABLET | Refills: 3 | DISCHARGE
Start: 2020-05-22

## 2020-05-22 RX ADMIN — ACETAMINOPHEN 1000 MG: 500 TABLET ORAL at 06:31

## 2020-05-22 RX ADMIN — ASPIRIN 81 MG 81 MG: 81 TABLET ORAL at 08:43

## 2020-05-22 RX ADMIN — ESOMEPRAZOLE MAGNESIUM 40 MG: 40 GRANULE, DELAYED RELEASE ORAL at 08:43

## 2020-05-22 RX ADMIN — SIMETHICONE CHEW TAB 80 MG 80 MG: 80 TABLET ORAL at 22:23

## 2020-05-22 RX ADMIN — APIXABAN 5 MG: 5 TABLET, FILM COATED ORAL at 22:22

## 2020-05-22 RX ADMIN — ACETAMINOPHEN 1000 MG: 500 TABLET ORAL at 22:22

## 2020-05-22 RX ADMIN — SIMETHICONE CHEW TAB 80 MG 80 MG: 80 TABLET ORAL at 15:28

## 2020-05-22 RX ADMIN — ATORVASTATIN CALCIUM 40 MG: 40 TABLET, FILM COATED ORAL at 22:22

## 2020-05-22 RX ADMIN — TRAZODONE HYDROCHLORIDE 100 MG: 100 TABLET ORAL at 22:22

## 2020-05-22 RX ADMIN — ACETAMINOPHEN 1000 MG: 500 TABLET ORAL at 15:28

## 2020-05-22 RX ADMIN — SIMETHICONE CHEW TAB 80 MG 80 MG: 80 TABLET ORAL at 06:33

## 2020-05-22 RX ADMIN — APIXABAN 5 MG: 5 TABLET, FILM COATED ORAL at 08:43

## 2020-05-22 RX ADMIN — ANTI-FUNGAL POWDER MICONAZOLE NITRATE TALC FREE: 1.42 POWDER TOPICAL at 22:22

## 2020-05-22 RX ADMIN — ANTI-FUNGAL POWDER MICONAZOLE NITRATE TALC FREE: 1.42 POWDER TOPICAL at 08:44

## 2020-05-22 ASSESSMENT — PAIN SCALES - GENERAL
PAINLEVEL_OUTOF10: 3
PAINLEVEL_OUTOF10: 0
PAINLEVEL_OUTOF10: 4
PAINLEVEL_OUTOF10: 0
PAINLEVEL_OUTOF10: 3

## 2020-05-22 ASSESSMENT — PAIN DESCRIPTION - ORIENTATION: ORIENTATION: LEFT

## 2020-05-22 ASSESSMENT — PAIN DESCRIPTION - PAIN TYPE: TYPE: ACUTE PAIN

## 2020-05-22 ASSESSMENT — PAIN DESCRIPTION - LOCATION: LOCATION: LEG

## 2020-05-22 NOTE — PROGRESS NOTES
-Continue Lasix via PEG tube.  -2D Echo 6/2017---  CONCLUSIONS     1 - Low normal to mildly depressed left ventricular systolic function (EF 50-55%).     2 - Impaired LV relaxation, elevated LAP (grade 2 diastolic dysfunction).     3 - The estimated PA systolic pressure is 32 mmHg.     4 - Mild mitral regurgitation.     5 - Trivial tricuspid regurgitation.     6 - Moderate left atrial enlargement.        [facility] Contin[facility]  Follow Up      NAME:  Laya Boyle  MEDICAL RECORD NUMBER:  [id number]  AGE: 78 y.o. GENDER: male  : 1950  TODAY'S DATE:  2020      Follow up visit this morning to eval buttock and groin skin areas. There was no erythema or denudation of skin noted. [facility] nursing will sign off case.      Awais Cummings BSN, RN, [id number]  Wound, Ostomy, and Continence Care  [phone]

## 2020-05-22 NOTE — PROGRESS NOTES
Bearing  Weight Bearing Technique: Yes  RUE Weight Bearing: Extended arm standing(assist to extend elbow)  Response To Weight Bearing Technique: OT provided support at elbow and knee while standing at counter during PM. 1st trial: 10 seconds x 2 WB; 2nd trial: 10 seconds x 2 WB, 20 seconds x 1 WB; 3rd trial: 30 seconds x 1 WB        Neuromuscular Education  Neuromuscular education: Yes  NDT Treatment: Upper extremity; Lower extremity  Weight Bearing  Weight Bearing Technique: Yes  RUE Weight Bearing: Extended arm standing(assist to extend elbow)  Response To Weight Bearing Technique: OT provided support at elbow and knee while standing at counter during PM. 1st trial: 10 seconds x 2 WB; 2nd trial: 10 seconds x 2 WB, 20 seconds x 1 WB; 3rd trial: 30 seconds x 1 WB           ADL  Equipment Provided: Reacher  Feeding: NPO(PEG tube)  Grooming: Setup  UE Bathing: Minimal assistance(A to postion RUE)  LE Bathing: Moderate assistance(A buttocks & B feet)  UE Dressing: Minimal assistance(VCs for technique; A for RUE d/t increased challenge to complete while seated EOB unsupported)  LE Dressing: Maximum assistance(Min A for pants; TA for socks/shoes)  Toileting: Moderate assistance(A for hygiene after BM; Mod A for clothing mgt)  Additional Comments: Pt engaged in UBB and UBD this date while seated EOB with SBA for seated balance with Min VCs to correct posture due to posterior lean and R lateral lean. In AM and PM, Pt engaged in task of donning shorts with use of reacher. Min VCs provided to thread RLE first with Poor return. Continue OT POC to maximize IND with self-care and mobility. Assessment  Performance deficits / Impairments: Decreased functional mobility ; Decreased ADL status; Decreased ROM; Decreased strength;Decreased endurance;Decreased sensation;Decreased balance;Decreased fine motor control;Decreased coordination;Decreased posture  Prognosis: Good  Discharge Recommendations: Subacute/Skilled Nursing focus on increasing function or RUE to promote increased IND and safety with self-care and mobility. Long term goals  Time Frame for Long term goals : By Discharge  Long term goal 1: Pt will complete eating/grooming/UB ADL's with set-up A and Good safety using AE/modified techniques as needed. Long term goal 2: Pt will complete LB ADL's/toileting with Min A and Good safety using AE/modified techniques as needed. Long term goal 3: Pt will complete functional transfers with Min-Mod A and Good safety using appropriate DME. Long term goal 4: Pt will demo IND with appropriate HEP for RUE ROM/strength/coordination.        05/22/20 0932 05/22/20 1235   OT Individual Minutes   [facility]   Time Out [id number]   Minutes 60 31   Time Code Minutes    Timed Code Treatment Minutes 60 Minutes 31 Minutes       Electronically signed by Cj Linares OT on 5/22/20 at 2:45 PM EDT

## 2020-05-22 NOTE — PLAN OF CARE
Nutrition Problem: Swallowing difficulty  Intervention: Food and/or Nutrient Delivery: Continue NPO, Modify current Tube Feeding  Nutritional Goals: Enteral feeding will meet greater than 85% of estimated nutrition needs

## 2020-05-22 NOTE — CARE COORDINATION
writer arranged for the pt to be transported to [facility] on 05/23/2020 at 200 pm [facility]. writer completed [facility] ppw and faxed ppw. [facility] with faxed confirmation was placed in the front of the chart. Writer called and informed pt son,sherley, of time of the discharge. Writer spoke to adore in the admission office at [address] [place] [address].

## 2020-05-22 NOTE — PROCEDURES
(PAS): 7 - Material enters the airway, passes below the vocal folds, and is not ejected from the trachea despite effort    Recommended Diet:  Solid consistency: Dysphagia Pureed (Dysphagia I)  Liquid consistency: Mildly Thick (Nectar)  Liquid administration via: Cup         Safe Swallow Protocol:     Compensatory Swallowing Strategies: Eat/Feed slowly; Small bites/sips;Assist feed;Upright as possible for all oral intake; Check for pocketing of food on the Right     No straws. Recommendations/Treatment  Requires SLP Intervention: Yes                  Recommended Exercises:    Therapeutic Interventions: Oral motor exercises; Therapeutic PO trials with SLP; Tongue base strengthening         Education:  recommendations were reviewed with pt.  following this exam.      Patient Education Response: Demonstrated understanding           Goals:    Long Term:    Diet at least restrictive consistency. Short Term:     Goal 1: Pt. will increase lingual/labial strength and ROM via oral motor exercises  Goal 2: Diet tolerance monitoring  Goal 3: Tongue base strengthening as pt. able to follow directions. Oral Preparation / Oral Phase  Oral Phase: Impaired        Pharyngeal Phase  Pharyngeal Phase: Impaired      Esophageal Phase  Esophageal Screen: WNL        Pain   Patient Currently in Pain: Yes  Pain Level: 4  Pain Type: Acute pain  Pain Location: Leg      Therapy Time:   Individual Concurrent Group Co-treatment   Time In 1430         Time Out 1445         Minutes 300 [facility]. AEfrenCCC/SLP   5/22/2020, 3:00 PM

## 2020-05-22 NOTE — PROGRESS NOTES
tablet by PEG Tube route nightly 5/22/20  Yes Yury Nguyen MD   ondansetron (ZOFRAN) 4 MG tablet Take 1 tablet by mouth every 6 hours as needed for Nausea or Vomiting 5/22/20  Yes Yury Nguyen MD   miconazole (MICOTIN) 2 % powder Apply topically 2 times daily. 5/22/20  Yes Yury Nguyen MD   polyethylene glycol (GLYCOLAX) 17 g packet Take 17 g by mouth daily as needed for Constipation 5/22/20 6/21/20 Yes Yury Nguyen MD   simethicone (MYLICON) 80 MG chewable tablet 1 tablet by PEG Tube route every 8 hours 5/22/20  Yes Yury Nguyen MD   esomeprazole Magnesium (NEXIUM) 40 MG PACK 1 packet by Per J Tube route daily 5/23/20  Yes Yury Nguyen MD   aspirin 81 MG chewable tablet 1 tablet by PEG Tube route daily 5/3/20   Amadeo Houser MD   atorvastatin (LIPITOR) 40 MG tablet 1 tablet by PEG Tube route nightly 5/3/20   [facility] Prabhjot Houser MD   lisinopril (PRINIVIL;ZESTRIL) 10 MG tablet Take 1 tablet by mouth daily 5/3/20   [facility] Vangie Ac MD        Allergies:     Patient has no known allergies. Social History:     Tobacco:    has no history on file for tobacco.  Alcohol:      has no history on file for alcohol. Drug Use:  has no history on file for drug. Family History:     No family history on file. Review of Systems:     Positive and Negative as described in HPI. Constitutional: Negative for chills, fatigue, fever and unexpected weight change. HENT: Negative for ear discharge, facial swelling, nosebleeds, sore throat, trouble swallowing and voice change. Eyes: Negative for redness and visual disturbance. Respiratory: Negative for cough, shortness of breath and wheezing. Cardiovascular: Negative for chest pain, palpitations and leg swelling. Gastrointestinal: Negative for abdominal pain, blood in stool, diarrhea and vomiting. Genitourinary: Negative for, dysuria and flank pain.   Chronic urinary

## 2020-05-22 NOTE — PROGRESS NOTES
Dietary called for consultation regarding the patient's tube feed while on a pureed diet. Will continue to monitor.      Electronically signed by Steve Zavala RN on 5/22/2020 at 3:11 PM

## 2020-05-22 NOTE — PROGRESS NOTES
MD Corrina        Allergies:     Patient has no known allergies. Social History:     Tobacco:    has no history on file for tobacco.  Alcohol:      has no history on file for alcohol. Drug Use:  has no history on file for drug. Family History:     No family history on file. Review of Systems:     Positive and Negative as described in HPI. Constitutional: Negative for chills, fatigue, fever and unexpected weight change. HENT: Negative for ear discharge, facial swelling, nosebleeds, sore throat, trouble swallowing and voice change. Eyes: Negative for redness and visual disturbance. Respiratory: Negative for cough, shortness of breath and wheezing. Cardiovascular: Negative for chest pain, palpitations and leg swelling. Gastrointestinal: Negative for abdominal pain, blood in stool, diarrhea and vomiting. Genitourinary: Negative for, dysuria and flank pain. Chronic urinary incontinence  Musculoskeletal: Negative for joint swelling. Skin: Bilateral lymphedema, stasis dermatitis is present  Neurological: Right-sided weakness  Hematological: Negative for adenopathy. Does not bruise/bleed easily. Physical Exam:     /69   Pulse 74   Temp 97.5 °F (36.4 °C) (Oral)   Resp 18   Ht 5' 10\" (1.778 m)   Wt (!) 320 lb (145.2 kg)   SpO2 99%   BMI 45.92 kg/m²   Temp (24hrs), Av.7 °F (36.5 °C), Min:97.5 °F (36.4 °C), Max:97.8 °F (36.6 °C)      General appearance:  alert, cooperative and no distress, obese, slurred speech  Eyes: Anicteric sclera. Pupils are equally round and reactive to light. Extraocular movements are intact.   Lungs:  clear to auscultation bilaterally, normal effort  Heart:  regular rate and rhythm, no murmur  Abdomen:  soft, nontender, nondistended, normal bowel sounds, no masses, hepatomegaly, splenomegaly, PEG tube in situ  Extremities:  no edema, redness, tenderness in the calves  Skin:  no gross lesions, rashes, induration  Neuro:  Alert, oriented X 3, [facility] TO INTERNAL MEDICINE  IP CONSULT TO Valeria Becerril MD  5/21/2020  10:14 PM    Copy sent to Dr. Davian Medina MD    Please note that this chart was generated using voice recognition Dragon dictation software. Although every effort was made to ensure the accuracy of this automated transcription, some errors in transcription may have occurred.

## 2020-05-22 NOTE — PROGRESS NOTES
Speech Language Pathology  Speech Language Pathology  [facility]    Speech Language Treatment Note    Date: 5/22/2020  Patients Name: Lyndsey Eli  MRN: [id number]  Diagnosis:   Patient Active Problem List   Diagnosis Code    Cerebrovascular accident (CVA) ([facility] I63.9    Acute ischemic left MCA stroke ([facility] [address] I63.512    Bradycardia R00.1    Aspiration pneumonia ([facility] [address] J69.0    Leukocytosis D72.829    Essential hypertension I10    Atrial fibrillation ([facility] [address] I48.91    Chronic acquired lymphedema I89.0    Dysphagia due to recent cerebral infarction I69.391    Acute CVA (cerebrovascular accident) ([facility] [address] I63.9    Hemiplegia and hemiparesis following cerebral infarction affecting right non-dominant side ([facility] [address] I69.353    Aphasia due to recent cerebrovascular accident I69.320       Pain: 0/10      Speech and Language Treatment  Treatment time:  0259-7622    Subjective: [x] Alert [x] Cooperative     [] Confused     [] Agitated      [] Lethargic    Objective/Assessment:  Auditory Comprehension:  Pt. Able to follow general directions and answer y/n questions asked. Verbal Expression:  n/a d/t severe apraxia    Reading Comprehension:  N/a    Speech:    Name objects around room- 40%, 100% c  sentence completion and phonemic cues. Pt. Repeated words in increasing length c max A to increase phoneme accuracy. Pt. Repeated monosyllabic words c min to mod A to increase phoneme accuracy, bisyllabic words c mod to max A. Pt. Approximations getting much closer to target. Other:  Pt. Required min  visual, verbal cues to follow oral motor exercises x10,  pt. Demonstrating no R sided facial movement upon labial lateralization. With application of  iced lemon swab on pt. Tongue, R buccal cavity, pt. Did elicit swallow L6/0. Pt. Able to complete effortful swallow x2, unable to complete Nena maneuver. ST planning to repeat video swallow study this pm to assess for progress of swallow function.

## 2020-05-22 NOTE — PROGRESS NOTES
Physical Medicine & Rehabilitation  Progress Note      Subjective:      Patient is 62-year-old male patient past medical history of left MCA infarct on 4/25/2020 secondary to cardioembolic stroke. Patient at the moment with right nondominant hemiparesis, aphasia, dysphasia secondary to her stroke with a PEG. Today patient was evaluated at bedside and found AAOx3, NAD, afebrile. Patient refers sleep somewhat better than yesterday. Able to sleep about 4-5 hours. Refer no nausea or vomiting. Still with severe dysarthria and aphasia. RUE is still weak but proximally moving better    ROS:  Denies fevers, chills, sweats. No chest pain, palpitations, lightheadedness. Denies coughing, wheezing or shortness of breath. Denies abdominal pain, nausea, diarrhea or constipation. No new areas of joint pain. Denies new areas of numbness or weakness. Denies new anxiety or depression issues. No new skin problems. Rehabilitation:   Progressing in therapies. PT:  Restrictions/Precautions: Fall Risk, Up as Tolerated(PEG tube)  Required Braces or Orthoses  Other: Abdominal Binder   Transfers  Sit to Stand: Minimal Assistance(A to place R UE (LE PRN with education) )  Stand to sit: Minimal Assistance(R UE safety; no return to VCs to reach back w/L UE)  Bed to Chair: Dependent/Total(Angélica Wong)  Stand Pivot Transfers: Maximum Assistance(2nd person SBA for safety)  Squat Pivot Transfers: Maximum Assistance(to L; no return to safe hand placement cue)  Comment: [place]. Multiple stand/squat pivots with vc's on safety techniques. Ambulation 1  Surface: level tile  Device: (Green walker lift)  Other Apparatus: Wheelchair follow  Assistance: 2 Person assistance, Minimal assistance  Quality of Gait: Demo's fair R LE swing until fatigued. Fair+ R TKE today with no buckling observed.  Less pushing noted today as pt is allowed to push walker lift PRN (rear wheels locked into straight path position) one LOB noted requiring break  Comments: PT likes to pump green lift walker to his height. 2 A for safety. OT:  ADL  Equipment Provided: Reacher, Long-handled sponge  Feeding: NPO(PEG tube)  Grooming: Setup  UE Bathing: Minimal assistance  LE Bathing: Moderate assistance  UE Dressing: Minimal assistance(Cues for proper technique; Incidental A for RUE)  LE Dressing: Maximum assistance(Min A for pants; TA for socks/shoes)  Toileting: Moderate assistance(A for hygiene after BM; Mod A for clothing mgt)  Additional Comments: Pt washed ashlyn-area and majority of legs to shin level while seated on toilet. Remainder of tasks completed from w/c level or standing in SS. Pt with good tolerance to task this date, improved use of elba-techniques, and improved coordination with use of reacher. Balance  Sitting Balance: Supervision  Standing Balance: Contact guard assistance(in Sharlett Corporal with LUE support, occasionally unsupported)   Standing Balance  Time: Several 1-2 minutes intervals  Activity: ADL's/transfers  Comment: 0-1 UE support; CGA on R side for safety  Functional Mobility  Functional - Mobility Device: Wheelchair  Activity: To/from bathroom  Assist Level: Dependent/Total     Bed mobility  Bridging: Minimal assistance(to support (R)LE)  Rolling to Left: Minimal assistance  Rolling to Right: Minimal assistance  Supine to Sit: Minimal assistance  Sit to Supine: Moderate assistance  Scooting: Minimal assistance(for R hip at EOB)  Comment: Pt left up in w/c at end of session. Transfers  Sit to stand: Minimal assistance(Varied CGA>Min A)  Stand to sit: Minimal assistance(Consistently Min A this date)  Transfer Comments: Sharlett Corporal used for all transfers; decreased verbal cues required for safe/controlled stand>sit.  improved with practice   Toilet Transfers  Equipment Used: Raised toilet seat with rails  Toilet Transfer: Dependent/Total  Toilet Transfers Comments: Sharlett Corporal; Min A for sit<>stand        Wheelchair Bed Transfers  Equipment Used: Bed, Wheelchair  Level of Asssistance: Dependent/Total  Wheelchair Transfers Comments: Joanne Ansari    SPEECH: (Note from 5/21/2020)   Auditory Comprehension:  Pt. Able to follow general directions and answer y/n questions asked.      Verbal Expression:  n/a d/t severe apraxia     Reading Comprehension:  N/a     Speech:    Name objects around room- 60%, 100% c  sentence completion and phonemic cues. Pt. Named days of week c 57%, 100% c cues. Pt. Counted 1-15 c 53% accuracy, 100% c cues. Pt. Approximations getting much closer to target. opposittes- 30%, 100% c gestural cues .         Other:  Pt. Required min  visual, verbal cues to follow oral motor exercises x10,  pt. Demonstrating no R sided facial movement upon labial lateralization.    With application of  iced lemon swab on pt. Tongue, R buccal cavity, pt. Did elicit swallow G9/2. ST planning to repeat video swallow study tomorrow to assess for progress of swallow function. Objective:  /61   Pulse 50   Temp 98.5 °F (36.9 °C) (Oral)   Resp 16   Ht 5' 10\" (1.778 m)   Wt 293 lb 14.4 oz (133.3 kg)   SpO2 95%   BMI 42.17 kg/m²       GEN: well developed, well nourished, NAD  HEENT: NCAT, PERRL, EOMI, mucous membranes pink and moist  CV: RRR, no murmurs, rubs or gallops  PULM: CTAB, no rales or rhonchi. ABD: soft, NT, ND, BS+ and equal.  PEG placed on left upper quadrant with good condition  NEURO: A&O x3. Patient able to follow 1, 2, 3 step commands. Patient with severe dysarthria and dysphagia. Some component of aphasia. Patient with impaired speech with broken words. Patient refer sensation decreased on right upper and lower extremity to light touch, pinprick. MSK: Functional ROM on left upper extremity and left lower extremity. Flaccid paralysis on right upper extremity and right lower extremity.               RUE: 1/5 on proximal muscle groups and 0/5 in distal muscle groups              LUE: 4+/5 in proximal and distal muscle groups              RLE:4-/5 in proximal and distal muscle groups. LLE: 4/5 in proximal and distal muscle groups  EXTREMITIES: No calf tenderness to palpation bilaterally. [place] is negative patient with chronic stable edema on bilateral lower extremities distally  SKIN: warm dry and intact with good turgor. Patient with chronic bilateral lower extremity skin changes due to lymphedema with dryness and hyperkeratosis  PSYCH: appropriately interactive. Affect WNL. Diagnostics:     CBC:   No results for input(s): WBC, RBC, HGB, HCT, MCV, RDW, PLT in the last 72 hours. BMP:   Recent Labs     05/21/20  0651 05/21/20  1339 05/22/20  0629    135 141   K 4.5 4.8 4.6    102 105   CO2 20 24 25   BUN 32* 31* 25*   CREATININE 1.03 1.07 1.06   GLUCOSE 123* 130* 106*     BNP: No results for input(s): BNP in the last 72 hours. PT/INR: No results for input(s): PROTIME, INR in the last 72 hours. APTT: No results for input(s): APTT in the last 72 hours. CARDIAC ENZYMES: No results for input(s): CKMB, CKMBINDEX, TROPONINT in the last 72 hours. Invalid input(s): CKTOTAL;3  FASTING LIPID PANEL:  Lab Results   Component Value Date    CHOL 111 04/25/2020    HDL 46 04/25/2020    TRIG 80 04/25/2020     LIVER PROFILE: No results for input(s): AST, ALT, ALB, BILIDIR, BILITOT, ALKPHOS in the last 72 hours.      Current Medications:   Current Facility-Administered Medications: simethicone (MYLICON) chewable tablet 80 mg, 80 mg, PEG Tube, Q8H  traZODone (DESYREL) tablet 100 mg, 100 mg, PEG Tube, Nightly  promethazine (PHENERGAN) tablet 12.5 mg, 12.5 mg, Oral, Q6H PRN  ondansetron (ZOFRAN) tablet 4 mg, 4 mg, Oral, Q6H PRN  acetaminophen (TYLENOL) tablet 1,000 mg, 1,000 mg, Oral, 3 times per day  miconazole (MICOTIN) 2 % powder, , Topical, BID  esomeprazole Magnesium (NEXIUM) 40 MG PACK 40 mg, 40 mg, Per J Tube, Daily  apixaban (ELIQUIS) tablet 5 mg, 5 mg, Oral, BID  albuterol management  13. DVT prophylaxis: On Eliquis 5mg PO BID    Electronically signed by:    Nu Agustin MD [facility]  Adult General Neurology Resident PGY-3  5/22/2020 at 8:31 AM      This note is created with the assistance of a speech recognition program.  While intending to generate a document that actually reflects the content of the visit, the document can still have some errors including those of syntax and sound a like substitutions which may escape proof reading. In such instances, actual meaning can be extrapolated by contextual diversion.

## 2020-05-23 VITALS
SYSTOLIC BLOOD PRESSURE: 131 MMHG | TEMPERATURE: 98.4 F | OXYGEN SATURATION: 97 % | HEART RATE: 67 BPM | RESPIRATION RATE: 16 BRPM | BODY MASS INDEX: 42.07 KG/M2 | HEIGHT: 70 IN | DIASTOLIC BLOOD PRESSURE: 58 MMHG | WEIGHT: 293.9 LBS

## 2020-05-23 LAB
ANION GAP SERPL CALCULATED.3IONS-SCNC: 9 MMOL/L (ref 9–17)
BUN BLDV-MCNC: 26 MG/DL (ref 8–23)
BUN/CREAT BLD: ABNORMAL (ref 9–20)
CALCIUM SERPL-MCNC: 8.9 MG/DL (ref 8.6–10.4)
CHLORIDE BLD-SCNC: 102 MMOL/L (ref 98–107)
CO2: 27 MMOL/L (ref 20–31)
CREAT SERPL-MCNC: 1.1 MG/DL (ref 0.7–1.2)
GFR AFRICAN AMERICAN: >60 ML/MIN
GFR NON-AFRICAN AMERICAN: >60 ML/MIN
GFR SERPL CREATININE-BSD FRML MDRD: ABNORMAL ML/MIN/{1.73_M2}
GFR SERPL CREATININE-BSD FRML MDRD: ABNORMAL ML/MIN/{1.73_M2}
GLUCOSE BLD-MCNC: 122 MG/DL (ref 75–110)
GLUCOSE BLD-MCNC: 126 MG/DL (ref 70–99)
POTASSIUM SERPL-SCNC: 4.1 MMOL/L (ref 3.7–5.3)
SODIUM BLD-SCNC: 138 MMOL/L (ref 135–144)

## 2020-05-23 PROCEDURE — 6370000000 HC RX 637 (ALT 250 FOR IP): Performed by: PSYCHIATRY & NEUROLOGY

## 2020-05-23 PROCEDURE — 6370000000 HC RX 637 (ALT 250 FOR IP): Performed by: INTERNAL MEDICINE

## 2020-05-23 PROCEDURE — 97535 SELF CARE MNGMENT TRAINING: CPT

## 2020-05-23 PROCEDURE — 99231 SBSQ HOSP IP/OBS SF/LOW 25: CPT | Performed by: INTERNAL MEDICINE

## 2020-05-23 PROCEDURE — 97116 GAIT TRAINING THERAPY: CPT

## 2020-05-23 PROCEDURE — 36415 COLL VENOUS BLD VENIPUNCTURE: CPT

## 2020-05-23 PROCEDURE — 80048 BASIC METABOLIC PNL TOTAL CA: CPT

## 2020-05-23 PROCEDURE — 6370000000 HC RX 637 (ALT 250 FOR IP): Performed by: PHYSICAL MEDICINE & REHABILITATION

## 2020-05-23 PROCEDURE — 92526 ORAL FUNCTION THERAPY: CPT

## 2020-05-23 PROCEDURE — 82947 ASSAY GLUCOSE BLOOD QUANT: CPT

## 2020-05-23 PROCEDURE — 92507 TX SP LANG VOICE COMM INDIV: CPT

## 2020-05-23 PROCEDURE — 97110 THERAPEUTIC EXERCISES: CPT

## 2020-05-23 PROCEDURE — 97530 THERAPEUTIC ACTIVITIES: CPT

## 2020-05-23 PROCEDURE — 97542 WHEELCHAIR MNGMENT TRAINING: CPT

## 2020-05-23 PROCEDURE — 6370000000 HC RX 637 (ALT 250 FOR IP): Performed by: STUDENT IN AN ORGANIZED HEALTH CARE EDUCATION/TRAINING PROGRAM

## 2020-05-23 PROCEDURE — 97112 NEUROMUSCULAR REEDUCATION: CPT

## 2020-05-23 RX ADMIN — ESOMEPRAZOLE MAGNESIUM 40 MG: 40 GRANULE, DELAYED RELEASE ORAL at 07:33

## 2020-05-23 RX ADMIN — ACETAMINOPHEN 1000 MG: 500 TABLET ORAL at 06:33

## 2020-05-23 RX ADMIN — APIXABAN 5 MG: 5 TABLET, FILM COATED ORAL at 07:33

## 2020-05-23 RX ADMIN — SIMETHICONE CHEW TAB 80 MG 80 MG: 80 TABLET ORAL at 06:33

## 2020-05-23 RX ADMIN — ANTI-FUNGAL POWDER MICONAZOLE NITRATE TALC FREE: 1.42 POWDER TOPICAL at 08:38

## 2020-05-23 RX ADMIN — ASPIRIN 81 MG 81 MG: 81 TABLET ORAL at 07:33

## 2020-05-23 ASSESSMENT — PAIN DESCRIPTION - DESCRIPTORS: DESCRIPTORS: PATIENT UNABLE TO DESCRIBE

## 2020-05-23 ASSESSMENT — PAIN DESCRIPTION - LOCATION
LOCATION: LEG
LOCATION: LEG

## 2020-05-23 ASSESSMENT — PAIN DESCRIPTION - ORIENTATION
ORIENTATION: LEFT
ORIENTATION: LEFT

## 2020-05-23 ASSESSMENT — PAIN DESCRIPTION - PROGRESSION: CLINICAL_PROGRESSION: GRADUALLY IMPROVING

## 2020-05-23 ASSESSMENT — PAIN SCALES - GENERAL
PAINLEVEL_OUTOF10: 3
PAINLEVEL_OUTOF10: 0
PAINLEVEL_OUTOF10: 3

## 2020-05-23 ASSESSMENT — PAIN DESCRIPTION - FREQUENCY: FREQUENCY: INTERMITTENT

## 2020-05-23 ASSESSMENT — PAIN DESCRIPTION - DIRECTION: RADIATING_TOWARDS: FROM HIP TO KNEE

## 2020-05-23 ASSESSMENT — PAIN DESCRIPTION - PAIN TYPE: TYPE: ACUTE PAIN

## 2020-05-23 NOTE — PROGRESS NOTES
arm  Comment: 0-1 UE support; CGA on R side for safety  Functional Mobility  Functional - Mobility Device: Wheelchair  Activity: To/from bathroom  Assist Level: Moderate assistance  Functional Mobility Comments: use of LUE and LLE to propel with Mod VCs for safety     Bed mobility  Bridging: Minimal assistance(to support (R)LE)  Rolling to Left: Minimal assistance  Rolling to Right: Minimal assistance  Supine to Sit: Minimal assistance  Sit to Supine: Moderate assistance  Scooting: Stand by assistance  Comment: Pt left up in w/c at end of session. Transfers  Sit to stand: Moderate assistance(Mod A to stand at counter; Min/CGA with SS)  Stand to sit: Minimal assistance  Transfer Comments: during sit>stand to counter, OT provided assist to block Pt's R knee   Toilet Transfers  Equipment Used: Raised toilet seat with rails  Toilet Transfer: Dependent/Total  Toilet Transfers Comments: Alyssa Chill; Min A for sit<>stand        Wheelchair Bed Transfers  Equipment Used: [employer], Wheelchair  Level of Asssistance: Dependent/Total  Wheelchair Transfers Comments: Alyssa Chill    SPEECH: (Note from 5/21/2020)   Auditory Comprehension:  Pt. Able to follow general directions and answer y/n questions asked.      Verbal Expression:  n/a d/t severe apraxia     Reading Comprehension:  N/a     Speech:    Name objects around room- 60%, 100% c  sentence completion and phonemic cues. Pt. Named days of week c 57%, 100% c cues. Pt. Counted 1-15 c 53% accuracy, 100% c cues. Pt. Approximations getting much closer to target. opposittes- 30%, 100% c gestural cues .         Other:  Pt. Required min  visual, verbal cues to follow oral motor exercises x10,  pt. Demonstrating no R sided facial movement upon labial lateralization.    With application of  iced lemon swab on pt. Tongue, R buccal cavity, pt. Did elicit swallow R6/7. ST planning to repeat video swallow study tomorrow to assess for progress of swallow function.     Objective:  BP (!) 131/58   Pulse 67   Temp 98.4 °F (36.9 °C) (Oral)   Resp 16   Ht 5' 10\" (1.778 m)   Wt 293 lb 14.4 oz (133.3 kg)   SpO2 97%   BMI 42.17 kg/m²       GEN: well developed, well nourished, NAD  HEENT: NCAT, PERRL, EOMI, mucous membranes pink and moist  CV: RRR, no murmurs, rubs or gallops  PULM: CTAB, no rales or rhonchi. ABD: soft, NT, ND, BS+ and equal.  PEG placed on left upper quadrant with good condition  NEURO: A&O x3. Patient able to follow 1, 2, 3 step commands. Patient with severe dysarthria and dysphagia. Some component of aphasia. Patient with impaired speech with broken words. Patient refer sensation decreased on right upper and lower extremity to light touch, pinprick. MSK: Functional ROM on left upper extremity and left lower extremity. Flaccid paralysis on right upper extremity and right lower extremity. RUE: 1/5 on proximal muscle groups and 0/5 in distal muscle groups              LUE: 4+/5 in proximal and distal muscle groups              RLE:4-/5 in proximal and distal muscle groups. LLE: 4/5 in proximal and distal muscle groups  EXTREMITIES: No calf tenderness to palpation bilaterally. Kushal Bacca is negative patient with chronic stable edema on bilateral lower extremities distally  SKIN: warm dry and intact with good turgor. Patient with chronic bilateral lower extremity skin changes due to lymphedema with dryness and hyperkeratosis  PSYCH: appropriately interactive. Affect WNL. Diagnostics:     CBC:   No results for input(s): WBC, RBC, HGB, HCT, MCV, RDW, PLT in the last 72 hours. BMP:   Recent Labs     05/21/20  1339 05/22/20  0629 05/23/20  0611    141 138   K 4.8 4.6 4.1    105 102   CO2 24 25 27   BUN 31* 25* 26*   CREATININE 1.07 1.06 1.10   GLUCOSE 130* 106* 126*     BNP: No results for input(s): BNP in the last 72 hours. PT/INR: No results for input(s): PROTIME, INR in the last 72 hours. APTT: No results for input(s):  APTT in the last 72 hours. CARDIAC ENZYMES: No results for input(s): CKMB, CKMBINDEX, TROPONINT in the last 72 hours. Invalid input(s): CKTOTAL;3  FASTING LIPID PANEL:  Lab Results   Component Value Date    CHOL 111 04/25/2020    HDL 46 04/25/2020    TRIG 80 04/25/2020     LIVER PROFILE: No results for input(s): AST, ALT, ALB, BILIDIR, BILITOT, ALKPHOS in the last 72 hours. Current Medications:   Current Facility-Administered Medications: simethicone (MYLICON) chewable tablet 80 mg, 80 mg, PEG Tube, Q8H  traZODone (DESYREL) tablet 100 mg, 100 mg, PEG Tube, Nightly  promethazine (PHENERGAN) tablet 12.5 mg, 12.5 mg, Oral, Q6H PRN  ondansetron (ZOFRAN) tablet 4 mg, 4 mg, Oral, Q6H PRN  acetaminophen (TYLENOL) tablet 1,000 mg, 1,000 mg, Oral, 3 times per day  miconazole (MICOTIN) 2 % powder, , Topical, BID  esomeprazole Magnesium (NEXIUM) 40 MG PACK 40 mg, 40 mg, Per J Tube, Daily  apixaban (ELIQUIS) tablet 5 mg, 5 mg, Oral, BID  albuterol (PROVENTIL) nebulizer solution 2.5 mg, 2.5 mg, Nebulization, As Directed RT PRN  aspirin chewable tablet 81 mg, 81 mg, PEG Tube, Daily  atorvastatin (LIPITOR) tablet 40 mg, 40 mg, PEG Tube, Nightly  polyethylene glycol (GLYCOLAX) packet 17 g, 17 g, Oral, Daily PRN      Impression/Plan:   Impaired ADLs, gait, and mobility due to:    1. Ischemic stroke with right hemiparesis: PT/OT  for gait, mobility, strengthening, endurance, ADLs, and self care. Patient currently on aspirin, atorvastatin  2. Aphasia/dysphagia: Patient currently on speech training. Patient with severe expressive aphasia with apraxia. Tube feedings are done via PEG. No currently on bolus. Patient was given Phenergan and has resolution of nauseas. Last swallow study showed severe dysphagia. Patient on Simethicone for stomach discomfort. 3. Left leg pain: Vascular studies on 5/18/2020 were unremarkable  4. WILLIAM: Patient's creatinine better today 1.06. Internal medicine aware.  They stop hydration and at moment will

## 2020-05-23 NOTE — PROGRESS NOTES
for hygiene after BM; A for clothing mgt on R side)  Additional Comments: Pt T/F'd from bed to toilet in SS. Pt completed UB bathing as well as ashlyn-hygiene and legs to shin level while seated on toilet. Pt completed remaining tasks from w/c level except standing to pull up pants which was done at sink. OT scores   Eating  Assistance Needed: Setup or clean-up assistance  Comment: Puree; Nectar thick, Set-up with Min-Mod spillage  CARE Score: 5  Oral Hygiene  Assistance Needed: Setup or clean-up assistance  CARE Score: [address] Needed: Partial/moderate assistance  Comment: A for hygiene after BM and partial A for clothing mgt on R side  CARE Score: 3  Shower/Bathe Self  Assistance Needed: Partial/moderate assistance  Comment: UB - Min A; LB - Mod A with AE  CARE Score: 3  Upper Body Dressing  Assistance Needed: Partial/moderate assistance  Comment: Min A for RUE  CARE Score: 3  Lower Body Dressing  Assistance Needed: Partial/moderate assistance  Comment: Partial A to thread RLE and pull up on R side  CARE Score: 3  Putting On/Taking Off Footwear  Assistance Needed: Substantial/maximal assistance  Comment: Pt able to doff socks with AE; TA to don socks and don/doff shoes  CARE Score: 2  Toilet Transfer  Assistance Needed: Dependent  Comment: CGA/Min A x1 with SS  CARE Score: 1    Assessment  Performance deficits / Impairments: Decreased functional mobility ; Decreased ADL status; Decreased ROM; Decreased strength;Decreased endurance;Decreased sensation;Decreased balance;Decreased fine motor control;Decreased coordination;Decreased posture  Prognosis: Good  Discharge Recommendations: Subacute/Skilled Nursing Facility  Activity Tolerance: Patient Tolerated treatment well  Safety Devices in place: Yes  Type of devices: Patient at risk for falls;Gait belt;Left in chair;Call light within reach          Patient Education:  Patient Goals   Patient goals : Pt with expressive aphasia - unable to Discharge  Long term goal 1: Pt will complete eating/grooming/UB ADL's with set-up A and Good safety using AE/modified techniques as needed. Long term goal 2: Pt will complete LB ADL's/toileting with Min A and Good safety using AE/modified techniques as needed. Long term goal 3: Pt will complete functional transfers with Min-Mod A and Good safety using appropriate DME. Long term goal 4: Pt will demo IND with appropriate HEP for RUE ROM/strength/coordination.      05/23/20 0935 05/23/20 1231   OT Individual Minutes   Time In [id number]   Time Out [id number]   Minutes 56 27   Time Code Minutes    Timed Code Treatment Minutes 56 Minutes 27 Minutes     Electronically signed by Lynetta Gottron on 5/23/20 at 2:18 PM EDT

## 2020-05-23 NOTE — PROGRESS NOTES
tablet by PEG Tube route nightly 5/22/20  Yes Diana Tucker MD   ondansetron (ZOFRAN) 4 MG tablet Take 1 tablet by mouth every 6 hours as needed for Nausea or Vomiting 5/22/20  Yes Diana Tucker MD   miconazole (MICOTIN) 2 % powder Apply topically 2 times daily. 5/22/20  Yes Diana Tucker MD   polyethylene glycol (GLYCOLAX) 17 g packet Take 17 g by mouth daily as needed for Constipation 5/22/20 6/21/20 Yes Diana Tucker MD   simethicone (MYLICON) 80 MG chewable tablet 1 tablet by PEG Tube route every 8 hours 5/22/20  Yes Diana Tucker MD   esomeprazole Magnesium (NEXIUM) 40 MG PACK 1 packet by Per J Tube route daily 5/23/20  Yes Diana Tucker MD   aspirin 81 MG chewable tablet 1 tablet by PEG Tube route daily 5/3/20   Amadeo Houser MD   atorvastatin (LIPITOR) 40 MG tablet 1 tablet by PEG Tube route nightly 5/3/20   Amadeo Houser MD   lisinopril (PRINIVIL;ZESTRIL) 10 MG tablet Take 1 tablet by mouth daily 5/3/20   Amadeo Pierce MD        Allergies:     Patient has no known allergies. Social History:     Tobacco:    has no history on file for tobacco.  Alcohol:      has no history on file for alcohol. Drug Use:  has no history on file for drug. Family History:     No family history on file. Review of Systems:     Positive and Negative as described in HPI. Constitutional: Negative for chills, fatigue, fever and unexpected weight change. HENT: Negative for ear discharge, facial swelling, nosebleeds, sore throat, trouble swallowing and voice change. Eyes: Negative for redness and visual disturbance. Respiratory: Negative for cough, shortness of breath and wheezing. Cardiovascular: Negative for chest pain, palpitations and leg swelling. Gastrointestinal: Negative for abdominal pain, blood in stool, diarrhea and vomiting. Genitourinary: Negative for, dysuria and flank pain.   Chronic urinary 04/30/2020    Dysphagia due to recent cerebral infarction [I69.391] 04/30/2020    Acute ischemic left MCA stroke [facility]) [N24.891]        Plan:      5/23/2020    Patient tolerating rehabilitative therapies . Progressing fairly well . Continue present therapy . ·    1.           1.  Right-sided weakness, Broca's aphasia, had acute left MCA infarct status post mechanical thrombectomy, on aspirin, Lipitor  2. Atrial fibrillation, on Eliquis 5 mg twice a day  3. Dysphagia, status post PEG tube placement  4. Hypertension, Readings of Low BP .  5. Aspiration pneumonia, completed antibiotics  6 . Tolerating Tube feed   7. Patient has worsening creatinine with episode of hypotension, starting patient on gentle hydration, will hold lisinopril, repeat BMP tomorrow  8 . Patient has heart failure with preserved ejection fraction, bilateral lymphedema, arterial scan done today is reported as normal  5/19   Patient has hypertension,  We will discontinue lisinopril  Started on IV fluids  Creatinine slightly better from yesterday  Monitoring BMP  5/22  Creatinine improved,  Hypotension improved  Off fluids   Will monitor off lisinopril  Patient had Swallow study today ,  Consultations:   Deedee Torres MD  5/23/2020  12:46 PM    Copy sent to Dr. Ravin Krishnamurthy MD    Please note that this chart was generated using voice recognition Dragon dictation software. Although every effort was made to ensure the accuracy of this automated transcription, some errors in transcription may have occurred. Gerri Cabrera

## 2020-05-28 NOTE — DISCHARGE SUMMARY
PRN.   Gait Deviations: Decreased step length, Decreased step height  Distance: 61' + 40'  Comments: Continued use of R slider would decrease fatigue & improve gait mechanics. ,    Mobility:  , PT Equipment Recommendations  Equipment Needed: Yes(TBD), Assessment: Improved gait mechanics with R slider; pt would benefit from continued use. Occupational therapy:  , Equipment Recommendations  Equipment Needed: Yes(TBD), Assessment: pt progressing well towards OT goals. Continue skilled OT services to address deficits noted above    Speech therapy:  Comprehension: 5 - Patient understands basic needs (hungry/hot/pain)  Expression: 1 - Expresses basic ideas/needs < 25% of the time  Social Interaction: 6 - Patient requires medication for mood and/or effect  Problem Solving: 3 - Patient solves simple/routine tasks 50%-74%  Memory: 3 - Patient remembers 50%-74% of the time      Inpatient Rehabilitation Course:   Hanna Stanford is a 78 y.o. male admitted to inpatient rehabilitation on 5/3/2020 for rehab for CVA. INITIAL HPI:  He was originally admitted to [facility] on 4/25/2020 for R sided weakness, facial droop and aphasia. Initial NIHSS 20. Work-up included CT, MRI, TTE. MRI was consistent with multiple infarcts in L MCA territory. Treatment included mechanical thrombectomy performed by Dr. Frankie Mobley on 4/25/2020 after CTA showed L M2 occlusion. General surgery placed PEG tube on 5/1/2020.      Cardiology evaluated patient for A fib with slow ventricular response. Recommending avoiding beta blockers, holter monitor after discharge from rehab, and Eliquis for anticoagulation. Neurology recommending repeat head CT at day 10 (May 5th) and if no further bleeding, can initiate Eliquis.      ID evaluated for acute hypoxic respiratory failure and concern for aspiration pneumonia. He was treated with Zosyn and vancomycin. Noted venous stasis dermatosis.        Rehab course:   Patient's blood pressure was stable on his home facility, PM&R 8 weeks or 2-4 weeks after discharge from facility, neurology follow up. Disposition: To [facility]    Discharge Medications:   Claudia Shelton, [address] Medication Instructions TCM:[id number]    Printed on:05/28/20 1238   Medication Information                      albuterol (PROVENTIL) (2.5 MG/3ML) 0.083% nebulizer solution  Take 3 mLs by nebulization every 6 hours as needed for Wheezing or Shortness of Breath             apixaban (ELIQUIS) 5 MG TABS tablet  Take 1 tablet by mouth 2 times daily             aspirin 81 MG chewable tablet  1 tablet by PEG Tube route daily             atorvastatin (LIPITOR) 40 MG tablet  1 tablet by PEG Tube route nightly             esomeprazole Magnesium (NEXIUM) 40 MG PACK  1 packet by Per J Tube route daily             lisinopril (PRINIVIL;ZESTRIL) 10 MG tablet  Take 1 tablet by mouth daily             miconazole (MICOTIN) 2 % powder  Apply topically 2 times daily.              ondansetron (ZOFRAN) 4 MG tablet  Take 1 tablet by mouth every 6 hours as needed for Nausea or Vomiting             polyethylene glycol (GLYCOLAX) 17 g packet  Take 17 g by mouth daily as needed for Constipation             simethicone (MYLICON) 80 MG chewable tablet  1 tablet by PEG Tube route every 8 hours             traZODone (DESYREL) 100 MG tablet  1 tablet by PEG Tube route nightly                 Zenia Shannon MD

## 2020-11-03 PROBLEM — I63.9 CEREBROVASCULAR ACCIDENT (CVA) (HCC): Status: RESOLVED | Noted: 2020-04-25 | Resolved: 2020-11-03

## 2024-12-31 NOTE — ANESTHESIA PRE PROCEDURE
Intravenous 2 times per day Minoo Digioia, DO   10 mL at 04/30/20 0815    sodium chloride flush 0.9 % injection 10 mL  10 mL Intravenous PRN Minoo Digioia, DO        acetaminophen (TYLENOL) tablet 650 mg  650 mg Oral Q6H PRN Minoo Digioia, DO        Or    acetaminophen (TYLENOL) suppository 650 mg  650 mg Rectal Q6H PRN Minoo Digioia, DO        promethazine (PHENERGAN) tablet 12.5 mg  12.5 mg Oral Q6H PRN Minoo Digioia, DO        Or    ondansetron (ZOFRAN) injection 4 mg  4 mg Intravenous Q6H PRN Minoo Digioia, DO        senna (SENOKOT) tablet 8.6 mg  1 tablet Oral Daily PRN Minoo Digioia, DO        glucose (GLUTOSE) 40 % oral gel 15 g  15 g Oral PRN Minoo Digioia, DO        dextrose 50 % IV solution  12.5 g Intravenous PRN Minoo Digioia, DO        glucagon (rDNA) injection 1 mg  1 mg Intramuscular PRN Minoo Digioia, DO        dextrose 5 % solution  100 mL/hr Intravenous PRN Minoo Digioia, DO        hydrALAZINE (APRESOLINE) injection 10 mg  10 mg Intravenous Q6H PRN Minoo Digioia, DO   10 mg at 04/26/20 1711    aspirin EC tablet 81 mg  81 mg Oral Daily Minoo Digioia, DO   81 mg at 04/30/20 1783    Or    aspirin suppository 300 mg  300 mg Rectal Daily Minoo Digioia, DO        heparin 25,000 units in dextrose 5% 250 mL infusion  1,000 Units/hr Intravenous Continuous Minoo Digioia, DO   Stopped at 05/01/20 0315       No Known Allergies  Patient Active Problem List   Diagnosis    Cerebrovascular accident (CVA) ([facility] [address]    Acute ischemic left MCA stroke (HCC)    Bradycardia    Arterial line in place    Aspiration pneumonia (HCC)    Leukocytosis    Essential hypertension    Hypotension    Atrial fibrillation (HCC)    Chronic acquired lymphedema    Dysphagia    WILLIAM (acute kidney injury) ([facility] [address]     Past Medical History:   Diagnosis Date    Arterial ischemic stroke, MCA (middle cerebral artery), left, acute ([facility] [address] 04/25/2020    Atrial fibrillation ([facility] [address]     Stroke ([facility] [address]
(2) more than 100 beats/min

## (undated) DEVICE — MIC* SAFETY PERCUTANEOUS ENDOSCOPIC GASTROSTOMY PEG KIT - 20 FR - PULL: Brand: MIC PEG TUBE